# Patient Record
Sex: MALE | Race: WHITE | Employment: OTHER | URBAN - METROPOLITAN AREA
[De-identification: names, ages, dates, MRNs, and addresses within clinical notes are randomized per-mention and may not be internally consistent; named-entity substitution may affect disease eponyms.]

---

## 2017-01-20 DIAGNOSIS — I48.20 CHRONIC ATRIAL FIBRILLATION (HCC): ICD-10-CM

## 2017-01-20 RX ORDER — WARFARIN SODIUM 1 MG/1
1 TABLET ORAL DAILY
Qty: 90 TABLET | Refills: 3 | Status: SHIPPED | OUTPATIENT
Start: 2017-01-20 | End: 2018-04-06 | Stop reason: ALTCHOICE

## 2017-01-27 ENCOUNTER — HOSPITAL ENCOUNTER (OUTPATIENT)
Age: 82
Setting detail: SPECIMEN
Discharge: HOME | End: 2017-01-27
Payer: MEDICARE

## 2017-01-27 ENCOUNTER — NURSE ONLY (OUTPATIENT)
Dept: FAMILY MEDICINE CLINIC | Age: 82
End: 2017-01-27

## 2017-01-27 DIAGNOSIS — I48.20 CHRONIC ATRIAL FIBRILLATION (HCC): Primary | ICD-10-CM

## 2017-01-27 DIAGNOSIS — E87.6 POTASSIUM DEFICIENCY: ICD-10-CM

## 2017-01-27 DIAGNOSIS — Z23 NEED FOR PNEUMOCOCCAL VACCINATION: ICD-10-CM

## 2017-01-27 LAB
ANION GAP SERPL CALCULATED.3IONS-SCNC: 14 MEQ/L (ref 7–13)
BUN BLDV-MCNC: 29 MG/DL (ref 8–23)
CALCIUM SERPL-MCNC: 9.1 MG/DL (ref 8.6–10.2)
CHLORIDE BLD-SCNC: 98 MEQ/L (ref 98–107)
CO2: 31 MEQ/L (ref 22–29)
CREAT SERPL-MCNC: 1.09 MG/DL (ref 0.7–1.2)
GFR AFRICAN AMERICAN: >60
GFR NON-AFRICAN AMERICAN: >60
GLUCOSE BLD-MCNC: 113 MG/DL (ref 74–109)
INTERNATIONAL NORMALIZATION RATIO, POC: 5.4
POTASSIUM SERPL-SCNC: 3.6 MEQ/L (ref 3.5–5.1)
PROTHROMBIN TIME, POC: ABNORMAL
SODIUM BLD-SCNC: 143 MEQ/L (ref 132–144)

## 2017-01-27 PROCEDURE — 85610 PROTHROMBIN TIME: CPT | Performed by: FAMILY MEDICINE

## 2017-01-27 PROCEDURE — G0009 ADMIN PNEUMOCOCCAL VACCINE: HCPCS | Performed by: FAMILY MEDICINE

## 2017-01-27 PROCEDURE — 80048 BASIC METABOLIC PNL TOTAL CA: CPT

## 2017-01-27 PROCEDURE — 90732 PPSV23 VACC 2 YRS+ SUBQ/IM: CPT | Performed by: FAMILY MEDICINE

## 2017-02-04 ENCOUNTER — HOSPITAL ENCOUNTER (OUTPATIENT)
Dept: LAB | Age: 82
Discharge: HOME | End: 2017-02-04
Payer: MEDICARE

## 2017-02-04 LAB
INR BLD: 4.4
PROTHROMBIN TIME: 50 SEC (ref 8.1–13.7)

## 2017-02-04 PROCEDURE — 36415 COLL VENOUS BLD VENIPUNCTURE: CPT

## 2017-02-04 PROCEDURE — 85610 PROTHROMBIN TIME: CPT

## 2017-02-07 ENCOUNTER — ANTI-COAG VISIT (OUTPATIENT)
Dept: FAMILY MEDICINE CLINIC | Age: 82
End: 2017-02-07

## 2017-02-07 DIAGNOSIS — I48.20 CHRONIC ATRIAL FIBRILLATION (HCC): Primary | ICD-10-CM

## 2017-02-07 RX ORDER — WARFARIN SODIUM 5 MG/1
TABLET ORAL
Qty: 30 TABLET | Refills: 11 | Status: SHIPPED | OUTPATIENT
Start: 2017-02-07 | End: 2018-04-06 | Stop reason: ALTCHOICE

## 2017-02-10 DIAGNOSIS — K21.9 GASTROESOPHAGEAL REFLUX DISEASE WITHOUT ESOPHAGITIS: ICD-10-CM

## 2017-02-10 DIAGNOSIS — E03.9 HYPOTHYROIDISM, UNSPECIFIED TYPE: ICD-10-CM

## 2017-02-10 RX ORDER — LEVOTHYROXINE SODIUM 112 UG/1
TABLET ORAL
Qty: 90 TABLET | Refills: 3 | Status: SHIPPED | OUTPATIENT
Start: 2017-02-10 | End: 2017-11-15 | Stop reason: SDUPTHER

## 2017-02-10 RX ORDER — OMEPRAZOLE 20 MG/1
CAPSULE, DELAYED RELEASE ORAL
Qty: 90 CAPSULE | Refills: 3 | Status: SHIPPED | OUTPATIENT
Start: 2017-02-10 | End: 2017-11-15 | Stop reason: SDUPTHER

## 2017-02-25 ENCOUNTER — HOSPITAL ENCOUNTER (OUTPATIENT)
Dept: LAB | Age: 82
Discharge: HOME | End: 2017-02-25
Payer: MEDICARE

## 2017-02-25 LAB
INR BLD: 1.8
PROTHROMBIN TIME: 20.2 SEC (ref 8.1–13.7)

## 2017-02-25 PROCEDURE — 36415 COLL VENOUS BLD VENIPUNCTURE: CPT

## 2017-02-25 PROCEDURE — 85610 PROTHROMBIN TIME: CPT

## 2017-04-21 ENCOUNTER — OFFICE VISIT (OUTPATIENT)
Dept: FAMILY MEDICINE CLINIC | Age: 82
End: 2017-04-21

## 2017-04-21 ENCOUNTER — TELEPHONE (OUTPATIENT)
Dept: FAMILY MEDICINE CLINIC | Age: 82
End: 2017-04-21

## 2017-04-21 VITALS
BODY MASS INDEX: 36.31 KG/M2 | HEART RATE: 65 BPM | WEIGHT: 282.8 LBS | TEMPERATURE: 98.5 F | SYSTOLIC BLOOD PRESSURE: 114 MMHG | DIASTOLIC BLOOD PRESSURE: 68 MMHG

## 2017-04-21 DIAGNOSIS — L97.509: ICD-10-CM

## 2017-04-21 DIAGNOSIS — I50.32 HEART FAILURE, DIASTOLIC, CHRONIC (HCC): ICD-10-CM

## 2017-04-21 DIAGNOSIS — I48.0 PAROXYSMAL ATRIAL FIBRILLATION (HCC): ICD-10-CM

## 2017-04-21 DIAGNOSIS — E13.621: ICD-10-CM

## 2017-04-21 DIAGNOSIS — K74.60 CIRRHOSIS OF LIVER WITHOUT ASCITES, UNSPECIFIED HEPATIC CIRRHOSIS TYPE (HCC): ICD-10-CM

## 2017-04-21 PROCEDURE — 99213 OFFICE O/P EST LOW 20 MIN: CPT | Performed by: FAMILY MEDICINE

## 2017-04-21 PROCEDURE — 4040F PNEUMOC VAC/ADMIN/RCVD: CPT | Performed by: FAMILY MEDICINE

## 2017-04-21 PROCEDURE — G8427 DOCREV CUR MEDS BY ELIG CLIN: HCPCS | Performed by: FAMILY MEDICINE

## 2017-04-21 PROCEDURE — 1123F ACP DISCUSS/DSCN MKR DOCD: CPT | Performed by: FAMILY MEDICINE

## 2017-04-21 PROCEDURE — G8419 CALC BMI OUT NRM PARAM NOF/U: HCPCS | Performed by: FAMILY MEDICINE

## 2017-04-21 PROCEDURE — 1036F TOBACCO NON-USER: CPT | Performed by: FAMILY MEDICINE

## 2017-04-21 ASSESSMENT — ENCOUNTER SYMPTOMS
CONSTIPATION: 0
RHINORRHEA: 0
DIARRHEA: 0
ABDOMINAL PAIN: 0
WHEEZING: 0
SORE THROAT: 0
COUGH: 0
SHORTNESS OF BREATH: 0

## 2017-04-25 ENCOUNTER — TELEPHONE (OUTPATIENT)
Dept: FAMILY MEDICINE CLINIC | Age: 82
End: 2017-04-25

## 2017-05-20 ENCOUNTER — HOSPITAL ENCOUNTER (OUTPATIENT)
Dept: LAB | Age: 82
Discharge: HOME | End: 2017-05-20
Payer: MEDICARE

## 2017-05-20 LAB
INR BLD: 2.7
PROTHROMBIN TIME: 29.6 SEC (ref 8.1–13.7)

## 2017-05-20 PROCEDURE — 85610 PROTHROMBIN TIME: CPT

## 2017-05-20 PROCEDURE — 36415 COLL VENOUS BLD VENIPUNCTURE: CPT

## 2017-06-17 DIAGNOSIS — E87.6 HYPOKALEMIA: ICD-10-CM

## 2017-06-19 RX ORDER — POTASSIUM CHLORIDE 1500 MG/1
TABLET, EXTENDED RELEASE ORAL
Qty: 90 TABLET | Refills: 2 | Status: SHIPPED | OUTPATIENT
Start: 2017-06-19 | End: 2017-09-19 | Stop reason: SDUPTHER

## 2017-06-27 DIAGNOSIS — E78.5 HYPERLIPIDEMIA, UNSPECIFIED HYPERLIPIDEMIA TYPE: ICD-10-CM

## 2017-06-28 RX ORDER — LOVASTATIN 20 MG/1
TABLET ORAL
Qty: 90 TABLET | Refills: 3 | Status: SHIPPED | OUTPATIENT
Start: 2017-06-28 | End: 2018-05-14 | Stop reason: SDUPTHER

## 2017-07-08 DIAGNOSIS — I48.20 CHRONIC ATRIAL FIBRILLATION (HCC): ICD-10-CM

## 2017-07-10 RX ORDER — WARFARIN SODIUM 7.5 MG/1
TABLET ORAL
Qty: 90 TABLET | Refills: 3 | Status: SHIPPED | OUTPATIENT
Start: 2017-07-10 | End: 2018-07-12 | Stop reason: SDUPTHER

## 2017-07-11 ENCOUNTER — CARE COORDINATION (OUTPATIENT)
Dept: FAMILY MEDICINE CLINIC | Age: 82
End: 2017-07-11

## 2017-07-18 ENCOUNTER — CARE COORDINATION (OUTPATIENT)
Dept: FAMILY MEDICINE CLINIC | Age: 82
End: 2017-07-18

## 2017-08-02 DIAGNOSIS — I10 ESSENTIAL HYPERTENSION: ICD-10-CM

## 2017-08-02 DIAGNOSIS — E78.5 HYPERLIPIDEMIA, UNSPECIFIED HYPERLIPIDEMIA TYPE: ICD-10-CM

## 2017-08-02 RX ORDER — NISOLDIPINE 8.5 MG/1
TABLET, FILM COATED, EXTENDED RELEASE ORAL
Qty: 90 TABLET | Refills: 2 | Status: SHIPPED | OUTPATIENT
Start: 2017-08-02 | End: 2018-02-03 | Stop reason: SDUPTHER

## 2017-08-02 RX ORDER — FENOFIBRATE 160 MG/1
TABLET ORAL
Qty: 90 TABLET | Refills: 2 | Status: SHIPPED | OUTPATIENT
Start: 2017-08-02 | End: 2018-02-03 | Stop reason: SDUPTHER

## 2017-08-04 DIAGNOSIS — I48.20 CHRONIC ATRIAL FIBRILLATION (HCC): ICD-10-CM

## 2017-08-04 DIAGNOSIS — I50.9 CONGESTIVE HEART FAILURE, UNSPECIFIED CONGESTIVE HEART FAILURE CHRONICITY, UNSPECIFIED CONGESTIVE HEART FAILURE TYPE: ICD-10-CM

## 2017-08-05 RX ORDER — FUROSEMIDE 40 MG/1
TABLET ORAL
Qty: 90 TABLET | Refills: 2 | Status: SHIPPED | OUTPATIENT
Start: 2017-08-05 | End: 2018-05-14 | Stop reason: SDUPTHER

## 2017-08-05 RX ORDER — METOPROLOL TARTRATE 50 MG/1
TABLET, FILM COATED ORAL
Qty: 45 TABLET | Refills: 3 | Status: SHIPPED | OUTPATIENT
Start: 2017-08-05 | End: 2018-08-11 | Stop reason: SDUPTHER

## 2017-08-05 RX ORDER — METOLAZONE 5 MG/1
TABLET ORAL
Qty: 90 TABLET | Refills: 1 | Status: SHIPPED | OUTPATIENT
Start: 2017-08-05 | End: 2018-02-03 | Stop reason: SDUPTHER

## 2017-08-11 ENCOUNTER — HOSPITAL ENCOUNTER (OUTPATIENT)
Age: 82
Setting detail: SPECIMEN
Discharge: HOME | End: 2017-08-11
Payer: MEDICARE

## 2017-08-11 ENCOUNTER — NURSE ONLY (OUTPATIENT)
Dept: FAMILY MEDICINE CLINIC | Age: 82
End: 2017-08-11

## 2017-08-11 DIAGNOSIS — I48.20 CHRONIC ATRIAL FIBRILLATION (HCC): ICD-10-CM

## 2017-08-11 DIAGNOSIS — I50.9 CONGESTIVE HEART FAILURE, UNSPECIFIED CONGESTIVE HEART FAILURE CHRONICITY, UNSPECIFIED CONGESTIVE HEART FAILURE TYPE: ICD-10-CM

## 2017-08-11 LAB
ANION GAP SERPL CALCULATED.3IONS-SCNC: 20 MEQ/L (ref 7–13)
BUN BLDV-MCNC: 29 MG/DL (ref 8–23)
CALCIUM SERPL-MCNC: 8.9 MG/DL (ref 8.6–10.2)
CHLORIDE BLD-SCNC: 92 MEQ/L (ref 98–107)
CO2: 29 MEQ/L (ref 22–29)
CREAT SERPL-MCNC: 1.21 MG/DL (ref 0.7–1.2)
GFR AFRICAN AMERICAN: >60
GFR NON-AFRICAN AMERICAN: 57.8
GLUCOSE BLD-MCNC: 82 MG/DL (ref 74–109)
INTERNATIONAL NORMALIZATION RATIO, POC: 1.9
POTASSIUM SERPL-SCNC: 3.7 MEQ/L (ref 3.5–5.1)
PROTHROMBIN TIME, POC: ABNORMAL
SODIUM BLD-SCNC: 141 MEQ/L (ref 132–144)

## 2017-08-11 PROCEDURE — 36415 COLL VENOUS BLD VENIPUNCTURE: CPT | Performed by: FAMILY MEDICINE

## 2017-08-11 PROCEDURE — 85610 PROTHROMBIN TIME: CPT | Performed by: FAMILY MEDICINE

## 2017-08-11 PROCEDURE — 80048 BASIC METABOLIC PNL TOTAL CA: CPT

## 2017-08-14 ENCOUNTER — CARE COORDINATION (OUTPATIENT)
Dept: FAMILY MEDICINE CLINIC | Age: 82
End: 2017-08-14

## 2017-09-19 DIAGNOSIS — E87.6 HYPOKALEMIA: ICD-10-CM

## 2017-09-19 RX ORDER — POTASSIUM CHLORIDE 20 MEQ/1
TABLET, EXTENDED RELEASE ORAL
Qty: 225 TABLET | Refills: 2 | Status: SHIPPED | OUTPATIENT
Start: 2017-09-19 | End: 2018-02-03 | Stop reason: SDUPTHER

## 2017-10-04 ENCOUNTER — CARE COORDINATION (OUTPATIENT)
Dept: FAMILY MEDICINE CLINIC | Age: 82
End: 2017-10-04

## 2017-10-04 NOTE — CARE COORDINATION
This patient was temporarily screened out of Care Coordination on 10/4/2017 for the following reason:  Unable to contact patient

## 2017-10-14 ENCOUNTER — OFFICE VISIT (OUTPATIENT)
Dept: FAMILY MEDICINE CLINIC | Age: 82
End: 2017-10-14

## 2017-10-14 VITALS
SYSTOLIC BLOOD PRESSURE: 112 MMHG | OXYGEN SATURATION: 97 % | HEIGHT: 74 IN | DIASTOLIC BLOOD PRESSURE: 72 MMHG | TEMPERATURE: 97.4 F | HEART RATE: 67 BPM | BODY MASS INDEX: 36.59 KG/M2 | WEIGHT: 285.1 LBS

## 2017-10-14 DIAGNOSIS — Z23 NEED FOR INFLUENZA VACCINATION: ICD-10-CM

## 2017-10-14 DIAGNOSIS — I48.20 CHRONIC ATRIAL FIBRILLATION (HCC): Primary | ICD-10-CM

## 2017-10-14 DIAGNOSIS — Z00.00 PHYSICAL EXAM: ICD-10-CM

## 2017-10-14 DIAGNOSIS — R26.9 GAIT DIFFICULTY: ICD-10-CM

## 2017-10-14 LAB
INTERNATIONAL NORMALIZATION RATIO, POC: 2.9
PROTHROMBIN TIME, POC: NORMAL

## 2017-10-14 PROCEDURE — 1036F TOBACCO NON-USER: CPT | Performed by: FAMILY MEDICINE

## 2017-10-14 PROCEDURE — 1123F ACP DISCUSS/DSCN MKR DOCD: CPT | Performed by: FAMILY MEDICINE

## 2017-10-14 PROCEDURE — G8484 FLU IMMUNIZE NO ADMIN: HCPCS | Performed by: FAMILY MEDICINE

## 2017-10-14 PROCEDURE — G0008 ADMIN INFLUENZA VIRUS VAC: HCPCS | Performed by: FAMILY MEDICINE

## 2017-10-14 PROCEDURE — G8417 CALC BMI ABV UP PARAM F/U: HCPCS | Performed by: FAMILY MEDICINE

## 2017-10-14 PROCEDURE — 85610 PROTHROMBIN TIME: CPT | Performed by: FAMILY MEDICINE

## 2017-10-14 PROCEDURE — 99213 OFFICE O/P EST LOW 20 MIN: CPT | Performed by: FAMILY MEDICINE

## 2017-10-14 PROCEDURE — 90662 IIV NO PRSV INCREASED AG IM: CPT | Performed by: FAMILY MEDICINE

## 2017-10-14 PROCEDURE — 4040F PNEUMOC VAC/ADMIN/RCVD: CPT | Performed by: FAMILY MEDICINE

## 2017-10-14 PROCEDURE — G8427 DOCREV CUR MEDS BY ELIG CLIN: HCPCS | Performed by: FAMILY MEDICINE

## 2017-10-14 ASSESSMENT — ENCOUNTER SYMPTOMS
ABDOMINAL PAIN: 0
WHEEZING: 0
SHORTNESS OF BREATH: 0
SORE THROAT: 0
RHINORRHEA: 0
CONSTIPATION: 0
DIARRHEA: 0
COUGH: 0

## 2017-10-14 NOTE — PROGRESS NOTES
[address] [place] [address] PRIMARY CARE [address] [id number]  Dept: [phone]  Dept Fax: : [phone]   Chief Complaint  Chief Complaint   Patient presents with    Other     Pt is here today to have an evaluation done to get a lift chair. ( pt's daughter would like it faxed to [facility])       HPI:  80 y.o. male who presents for difficulty with mobilization:    Difficulty with walking, standing, tranferring. Uses cane for walking but has been using a lift chair to help him standing from sitting to standing. It's curretnly stopped working and he needs a new one. Chronic afib: takes 7.5 warfarin daily. INR 2.9 today. Past Medical History:   Diagnosis Date    [facility]     Carcinoma of ascending colon ([facility] 2014    CHF (congestive heart failure) (HCC)     Chronic atrial fibrillation ([facility] 3/3/2016    Dementia     Foot ulcer, left ([facility] 3/3/2016    Hyperlipidemia     Hypertension     Hypothyroidism     Type II or unspecified type diabetes mellitus without mention of complication, not stated as uncontrolled     diet     Past Surgical History:   Procedure Laterality Date    COLONOSCOPY  5/9/14    bx     OTHER SURGICAL HISTORY  7/29/14    Right colectomy with primary anastomosis and liver bx    RIGHT COLECTOMY  07/29/14    ANDSAVANNAKO    UPPER GASTROINTESTINAL ENDOSCOPY  5/9/14    bx ike     Social History     Social History    Marital status:      Spouse name: N/A    Number of children: N/A    Years of education: N/A     Occupational History    Not on file.      Social History Main Topics    Smoking status: Never Smoker    Smokeless tobacco: Never Used    Alcohol use No    Drug use: No    Sexual activity: No     Other Topics Concern    Not on file     Social History Narrative    No narrative on file     No Known Allergies  Current Outpatient Prescriptions Medication Sig Dispense Refill    Lift Chair MISC by Does not apply route 1 each 0    potassium chloride (KLOR-CON M20) 20 MEQ extended release tablet Take 2 (TWO) and ONE-HALF tablets by mouth twice daily 225 tablet 2    metolazone (ZAROXOLYN) 5 MG tablet TAKE 1 TABLET BY MOUTH DAILY. 90 tablet 1    metoprolol tartrate (LOPRESSOR) 50 MG tablet take ONE-HALF tablet once a day 45 tablet 3    furosemide (LASIX) 40 MG tablet Take 1 tablet by mouth daily 90 tablet 2    nisoldipine (SULAR) 8.5 MG extended release tablet TAKE 1 TABLET BY MOUTH EVERY DAY 90 tablet 2    fenofibrate 160 MG tablet Take 1 tablet by mouth daily Note to pharmacy: Please dispense generic fenofibrate unless prescriber denotes ELDA 90 tablet 2    warfarin (COUMADIN) 7.5 MG tablet Take as directed 90 tablet 3    lovastatin (MEVACOR) 20 MG tablet Take 1 tablet by mouth nightly 90 tablet 3    levothyroxine (SYNTHROID) 112 MCG tablet TAKE 1 TABLET BY MOUTH DAILY. 90 tablet 3    omeprazole (PRILOSEC) 20 MG delayed release capsule Take 1 capsule by mouth daily 90 capsule 3    warfarin (COUMADIN) 5 MG tablet Per coumadin regimen instructions 30 tablet 11    warfarin (COUMADIN) 1 MG tablet Take 1 tablet by mouth daily 90 tablet [address] by Does not apply route 1 each 0    Lift Chair MISC by Does not apply route 1 each 0    ferrous sulfate 325 (65 FE) MG tablet Take 1 tablet by mouth 2 times daily 180 tablet 3    SILVERSEAL HYDROGEL DRESSING 4\"X5\" PADS Apply 1 Units topically daily 20 each 2    Incontinence Supply Disposable (ATTENDS BARIATRIC BRIEF XXL) MISC Use around the clock as needed 2 Bottle 0    aspirin 81 MG tablet Take 81 mg by mouth daily. No current facility-administered medications for this visit. ROS:  Review of Systems   Constitutional: Negative for chills and fever. HENT: Negative for rhinorrhea and sore throat. Respiratory: Negative for cough, shortness of breath and wheezing. Gastrointestinal: Negative for abdominal pain, constipation and diarrhea. Endocrine: Negative for polydipsia and polyuria. Genitourinary: Negative for dysuria, frequency and urgency. Musculoskeletal: Positive for gait problem. Neurological: Negative for syncope, light-headedness, numbness and headaches. Psychiatric/Behavioral: Negative for sleep disturbance. The patient is not nervous/anxious. Vitals:    10/14/17 0842   BP: 112/72   Site: Left Arm   Position: Sitting   Cuff Size: Large Adult   Pulse: 67   Temp: 97.4 °F (36.3 °C)   TempSrc: Oral   SpO2: 97%   Weight: 285 lb 1.6 oz (129.3 kg)   Height: 6' 2\" (1.88 m)       Physical exam:  Physical Exam   Constitutional: He is oriented to person, place, and time. He appears well-developed and well-nourished. No distress. HENT:   Head: Normocephalic and atraumatic. Mouth/Throat: No oropharyngeal exudate. Eyes: EOM are normal.   Neck: No thyromegaly present. Cardiovascular: Normal rate, regular rhythm and normal heart sounds. No murmur heard. Pulmonary/Chest: Effort normal and breath sounds normal. No respiratory distress. He has no wheezes. Abdominal: Soft. He exhibits no distension. There is no tenderness. There is no rebound and no guarding. Lymphadenopathy:     He has no cervical adenopathy. Neurological: He is alert and oriented to person, place, and time. Subtle leg weakness. Skin: Skin is warm and dry. Psychiatric: He has a normal mood and affect. His behavior is normal.   Vitals reviewed. Assessment/Plan:  80 y.o. male here mainly for afib:  - Afib: doing well; INR at goal 2.9; no changes. Next INR in a month. - Physical exam: stable on standing but a little weak. Order for new lift chair    1. Chronic atrial fibrillation (HCC)  POCT INR   2. Need for influenza vaccination  INFLUENZA, HIGH DOSE, 65 YRS +, IM, PF, PREFILL SYR, 0.5ML (FLUZONE HD)   3. Gait difficulty  Lift Chair MISC   4.  Physical exam  Lift Chair [address]        Return if symptoms worsen or fail to improve.     Kim Vickers MD

## 2017-11-09 ENCOUNTER — HOSPITAL ENCOUNTER (OUTPATIENT)
Age: 82
Setting detail: SPECIMEN
Discharge: HOME | End: 2017-11-09
Payer: MEDICARE

## 2017-11-09 ENCOUNTER — OFFICE VISIT (OUTPATIENT)
Dept: FAMILY MEDICINE CLINIC | Age: 82
End: 2017-11-09

## 2017-11-09 VITALS
SYSTOLIC BLOOD PRESSURE: 120 MMHG | HEART RATE: 61 BPM | OXYGEN SATURATION: 96 % | RESPIRATION RATE: 14 BRPM | TEMPERATURE: 97.1 F | BODY MASS INDEX: 37.42 KG/M2 | DIASTOLIC BLOOD PRESSURE: 72 MMHG | WEIGHT: 276.25 LBS | HEIGHT: 72 IN

## 2017-11-09 DIAGNOSIS — R30.0 DYSURIA: ICD-10-CM

## 2017-11-09 DIAGNOSIS — N30.01 ACUTE CYSTITIS WITH HEMATURIA: Primary | ICD-10-CM

## 2017-11-09 LAB
BILIRUBIN, POC: ABNORMAL
BLOOD URINE, POC: ABNORMAL
CLARITY, POC: ABNORMAL
COLOR, POC: ABNORMAL
GLUCOSE URINE, POC: ABNORMAL
KETONES, POC: ABNORMAL
LEUKOCYTE EST, POC: ABNORMAL
NITRITE, POC: ABNORMAL
PH, POC: 6
PROTEIN, POC: ABNORMAL
SPECIFIC GRAVITY, POC: 1.03
UROBILINOGEN, POC: ABNORMAL

## 2017-11-09 PROCEDURE — 4040F PNEUMOC VAC/ADMIN/RCVD: CPT | Performed by: NURSE PRACTITIONER

## 2017-11-09 PROCEDURE — G8417 CALC BMI ABV UP PARAM F/U: HCPCS | Performed by: NURSE PRACTITIONER

## 2017-11-09 PROCEDURE — 81002 URINALYSIS NONAUTO W/O SCOPE: CPT | Performed by: NURSE PRACTITIONER

## 2017-11-09 PROCEDURE — G8484 FLU IMMUNIZE NO ADMIN: HCPCS | Performed by: NURSE PRACTITIONER

## 2017-11-09 PROCEDURE — G8427 DOCREV CUR MEDS BY ELIG CLIN: HCPCS | Performed by: NURSE PRACTITIONER

## 2017-11-09 PROCEDURE — 1036F TOBACCO NON-USER: CPT | Performed by: NURSE PRACTITIONER

## 2017-11-09 PROCEDURE — 87086 URINE CULTURE/COLONY COUNT: CPT

## 2017-11-09 PROCEDURE — 1123F ACP DISCUSS/DSCN MKR DOCD: CPT | Performed by: NURSE PRACTITIONER

## 2017-11-09 PROCEDURE — 99213 OFFICE O/P EST LOW 20 MIN: CPT | Performed by: NURSE PRACTITIONER

## 2017-11-09 RX ORDER — SULFAMETHOXAZOLE AND TRIMETHOPRIM 800; 160 MG/1; MG/1
1 TABLET ORAL 2 TIMES DAILY
Qty: 20 TABLET | Refills: 0 | Status: SHIPPED | OUTPATIENT
Start: 2017-11-09 | End: 2017-11-19

## 2017-11-10 ASSESSMENT — ENCOUNTER SYMPTOMS
NAUSEA: 0
VOMITING: 0

## 2017-11-10 NOTE — PROGRESS NOTES
Subjective  [place]arm [place], 80 y.o. male presents today with:  Chief Complaint   Patient presents with    Urinary Tract Infection     x today. complains of urinary frequency, malodor, confusion. has tried increased fluids with unknown relief. Urinary Tract Infection    This is a new problem. The current episode started today. The problem occurs every urination. The problem has been unchanged. The patient is experiencing no pain. There has been no fever. Associated symptoms include frequency, hesitancy and urgency. Pertinent negatives include no chills, discharge, flank pain, hematuria, nausea, sweats or vomiting. He has tried increased fluids for the symptoms. The treatment provided no relief. Objective    Vitals:    11/09/17 1800   BP: 120/72   Pulse: 61   Resp: 14   Temp: 97.1 °F (36.2 °C)   TempSrc: Temporal   SpO2: 96%   Weight: 276 lb 4 oz (125.3 kg)   Height: 6' (1.829 m)       Physical Exam   Constitutional: He is oriented to person, place, and time. He appears well-developed and well-nourished. HENT:   Head: Normocephalic and atraumatic. Eyes: Conjunctivae and EOM are normal.   Neck: Normal range of motion. Pulmonary/Chest: Effort normal.   Abdominal: There is no CVA tenderness. Musculoskeletal: Normal range of motion. Neurological: He is alert and oriented to person, place, and time. Skin: Skin is warm and dry. Psychiatric: He has a normal mood and affect. Assessment & Plan   1. Acute cystitis with hematuria  Urine Culture    sulfamethoxazole-trimethoprim (BACTRIM DS;SEPTRA DS) 800-160 MG per tablet    Symptomatic, RX given, UA positive, sent for full cx   2. Dysuria  POCT URINALYSIS DIPSTICK    Urine Culture    sulfamethoxazole-trimethoprim (BACTRIM DS;SEPTRA DS) 800-160 MG per tablet    see 1       Return in about 1 week (around 11/16/2017) for INR check with PCP. Reviewed with the patient: current clinical status, medications, activities and diet.      Side effects, adverse effects of the medication prescribed today, as well as treatment plan/ rationale and result expectations have been discussed with the patient who expresses understanding and desires to proceed. Close follow up to evaluate treatment results and for coordination of care. I have reviewed the patient's medical history in detail and updated the computerized patient record.     Vini Cardenas NP

## 2017-11-12 LAB — URINE CULTURE, ROUTINE: NORMAL

## 2017-11-15 DIAGNOSIS — K21.9 GASTROESOPHAGEAL REFLUX DISEASE WITHOUT ESOPHAGITIS: ICD-10-CM

## 2017-11-15 DIAGNOSIS — E03.9 HYPOTHYROIDISM, UNSPECIFIED TYPE: ICD-10-CM

## 2017-11-16 RX ORDER — LEVOTHYROXINE SODIUM 112 UG/1
TABLET ORAL
Qty: 90 TABLET | Refills: 2 | Status: SHIPPED | OUTPATIENT
Start: 2017-11-16 | End: 2018-08-29 | Stop reason: SDUPTHER

## 2017-11-16 RX ORDER — OMEPRAZOLE 20 MG/1
CAPSULE, DELAYED RELEASE ORAL
Qty: 90 CAPSULE | Refills: 2 | Status: SHIPPED | OUTPATIENT
Start: 2017-11-16 | End: 2018-08-29 | Stop reason: SDUPTHER

## 2017-11-17 ENCOUNTER — OFFICE VISIT (OUTPATIENT)
Dept: FAMILY MEDICINE CLINIC | Age: 82
End: 2017-11-17

## 2017-11-17 VITALS
DIASTOLIC BLOOD PRESSURE: 72 MMHG | HEART RATE: 109 BPM | BODY MASS INDEX: 38.11 KG/M2 | OXYGEN SATURATION: 100 % | SYSTOLIC BLOOD PRESSURE: 138 MMHG | WEIGHT: 281 LBS

## 2017-11-17 DIAGNOSIS — I48.20 CHRONIC ATRIAL FIBRILLATION (HCC): Primary | ICD-10-CM

## 2017-11-17 LAB
INTERNATIONAL NORMALIZATION RATIO, POC: 3.3
PROTHROMBIN TIME, POC: ABNORMAL

## 2017-11-17 PROCEDURE — G8484 FLU IMMUNIZE NO ADMIN: HCPCS | Performed by: FAMILY MEDICINE

## 2017-11-17 PROCEDURE — 4040F PNEUMOC VAC/ADMIN/RCVD: CPT | Performed by: FAMILY MEDICINE

## 2017-11-17 PROCEDURE — 1123F ACP DISCUSS/DSCN MKR DOCD: CPT | Performed by: FAMILY MEDICINE

## 2017-11-17 PROCEDURE — G8427 DOCREV CUR MEDS BY ELIG CLIN: HCPCS | Performed by: FAMILY MEDICINE

## 2017-11-17 PROCEDURE — G8417 CALC BMI ABV UP PARAM F/U: HCPCS | Performed by: FAMILY MEDICINE

## 2017-11-17 PROCEDURE — 1036F TOBACCO NON-USER: CPT | Performed by: FAMILY MEDICINE

## 2017-11-17 PROCEDURE — 99213 OFFICE O/P EST LOW 20 MIN: CPT | Performed by: FAMILY MEDICINE

## 2017-11-17 ASSESSMENT — ENCOUNTER SYMPTOMS
ABDOMINAL PAIN: 0
CONSTIPATION: 0
RHINORRHEA: 0
DIARRHEA: 0
COUGH: 0
SHORTNESS OF BREATH: 0
WHEEZING: 0
SORE THROAT: 0

## 2017-11-17 NOTE — PROGRESS NOTES
wheezing. Gastrointestinal: Negative for abdominal pain, constipation and diarrhea. Endocrine: Negative for polydipsia and polyuria. Genitourinary: Positive for frequency. Negative for dysuria and urgency. Neurological: Negative for syncope, light-headedness, numbness and headaches. Psychiatric/Behavioral: Negative for sleep disturbance. The patient is not nervous/anxious. Vitals:    11/17/17 0948   BP: 138/72   Pulse: 109   SpO2: 100%   Weight: 281 lb (127.5 kg)       Physical exam:  Physical Exam   Constitutional: He is oriented to person, place, and time. He appears well-developed and well-nourished. No distress. HENT:   Head: Normocephalic and atraumatic. Mouth/Throat: No oropharyngeal exudate. Eyes: EOM are normal.   Neck: Normal range of motion. Cardiovascular: Normal rate. Pulmonary/Chest: Effort normal. No respiratory distress. Abdominal: Soft. He exhibits no distension. There is no tenderness. There is no rebound and no guarding. Neurological: He is alert and oriented to person, place, and time. Skin: Skin is warm and dry. Psychiatric: He has a normal mood and affect. His behavior is normal.   Vitals reviewed. Assessment/Plan:  80 y.o. male here mainly for f/u uti:  - UTI and microscopic hematuria: checking new UA  - Anticoag: no change in regimen; Next INR in 1-2 weeks. 1. Chronic atrial fibrillation (HCC)  POCT INR        Return if symptoms worsen or fail to improve.     Leonardo Almanza MD

## 2017-11-20 ENCOUNTER — TELEPHONE (OUTPATIENT)
Dept: FAMILY MEDICINE CLINIC | Age: 82
End: 2017-11-20

## 2017-11-20 NOTE — TELEPHONE ENCOUNTER
Sissy Unger( )called in to let you know Angel Lance missed his Adult day program due to transportation.

## 2017-12-11 ENCOUNTER — CARE COORDINATION (OUTPATIENT)
Dept: CARE COORDINATION | Age: 82
End: 2017-12-11

## 2017-12-11 NOTE — CARE COORDINATION
This patient was temporarily screened out of Care Coordination on 12/11/2017 for the following reason:  Unable to contact

## 2018-01-12 ENCOUNTER — OFFICE VISIT (OUTPATIENT)
Dept: FAMILY MEDICINE CLINIC | Age: 83
End: 2018-01-12

## 2018-01-12 VITALS
WEIGHT: 298.2 LBS | SYSTOLIC BLOOD PRESSURE: 130 MMHG | TEMPERATURE: 98.1 F | HEART RATE: 69 BPM | OXYGEN SATURATION: 96 % | DIASTOLIC BLOOD PRESSURE: 70 MMHG | BODY MASS INDEX: 40.44 KG/M2

## 2018-01-12 DIAGNOSIS — M54.50 LOW BACK PAIN WITHOUT SCIATICA, UNSPECIFIED BACK PAIN LATERALITY, UNSPECIFIED CHRONICITY: Primary | ICD-10-CM

## 2018-01-12 DIAGNOSIS — E66.01 MORBID OBESITY WITH BMI OF 40.0-44.9, ADULT (HCC): ICD-10-CM

## 2018-01-12 DIAGNOSIS — K74.60 CIRRHOSIS OF LIVER WITHOUT ASCITES, UNSPECIFIED HEPATIC CIRRHOSIS TYPE (HCC): ICD-10-CM

## 2018-01-12 DIAGNOSIS — I48.20 CHRONIC ATRIAL FIBRILLATION (HCC): ICD-10-CM

## 2018-01-12 DIAGNOSIS — I50.9 CONGESTIVE HEART FAILURE, UNSPECIFIED CONGESTIVE HEART FAILURE CHRONICITY, UNSPECIFIED CONGESTIVE HEART FAILURE TYPE: ICD-10-CM

## 2018-01-12 DIAGNOSIS — R35.0 URINARY FREQUENCY: ICD-10-CM

## 2018-01-12 DIAGNOSIS — R26.81 GAIT INSTABILITY: ICD-10-CM

## 2018-01-12 LAB
BILIRUBIN, POC: ABNORMAL
BLOOD URINE, POC: ABNORMAL
CLARITY, POC: CLEAR
COLOR, POC: ABNORMAL
GLUCOSE URINE, POC: ABNORMAL
INTERNATIONAL NORMALIZATION RATIO, POC: 1.5
KETONES, POC: ABNORMAL
LEUKOCYTE EST, POC: ABNORMAL
NITRITE, POC: ABNORMAL
PH, POC: 7.5
PROTEIN, POC: ABNORMAL
PROTHROMBIN TIME, POC: ABNORMAL
SPECIFIC GRAVITY, POC: 1.01
UROBILINOGEN, POC: ABNORMAL

## 2018-01-12 PROCEDURE — 4040F PNEUMOC VAC/ADMIN/RCVD: CPT | Performed by: FAMILY MEDICINE

## 2018-01-12 PROCEDURE — G8510 SCR DEP NEG, NO PLAN REQD: HCPCS | Performed by: FAMILY MEDICINE

## 2018-01-12 PROCEDURE — 99214 OFFICE O/P EST MOD 30 MIN: CPT | Performed by: FAMILY MEDICINE

## 2018-01-12 PROCEDURE — 1123F ACP DISCUSS/DSCN MKR DOCD: CPT | Performed by: FAMILY MEDICINE

## 2018-01-12 PROCEDURE — 1036F TOBACCO NON-USER: CPT | Performed by: FAMILY MEDICINE

## 2018-01-12 PROCEDURE — G8484 FLU IMMUNIZE NO ADMIN: HCPCS | Performed by: FAMILY MEDICINE

## 2018-01-12 PROCEDURE — 81003 URINALYSIS AUTO W/O SCOPE: CPT | Performed by: FAMILY MEDICINE

## 2018-01-12 PROCEDURE — G8427 DOCREV CUR MEDS BY ELIG CLIN: HCPCS | Performed by: FAMILY MEDICINE

## 2018-01-12 PROCEDURE — G8417 CALC BMI ABV UP PARAM F/U: HCPCS | Performed by: FAMILY MEDICINE

## 2018-01-12 PROCEDURE — 3288F FALL RISK ASSESSMENT DOCD: CPT | Performed by: FAMILY MEDICINE

## 2018-01-12 RX ORDER — TAMSULOSIN HYDROCHLORIDE 0.4 MG/1
0.4 CAPSULE ORAL DAILY
Qty: 30 CAPSULE | Refills: 3 | Status: SHIPPED | OUTPATIENT
Start: 2018-01-12 | End: 2018-07-28 | Stop reason: SDUPTHER

## 2018-01-12 RX ORDER — ACETAMINOPHEN 500 MG
1000 TABLET ORAL EVERY 8 HOURS PRN
Qty: 120 TABLET | Refills: 0 | Status: SHIPPED | OUTPATIENT
Start: 2018-01-12 | End: 2019-02-04

## 2018-01-12 ASSESSMENT — ENCOUNTER SYMPTOMS
BACK PAIN: 1
SHORTNESS OF BREATH: 0
WHEEZING: 0
ABDOMINAL PAIN: 0
SORE THROAT: 0
RHINORRHEA: 0
COUGH: 0
DIARRHEA: 0
CONSTIPATION: 0

## 2018-01-12 ASSESSMENT — PATIENT HEALTH QUESTIONNAIRE - PHQ9
1. LITTLE INTEREST OR PLEASURE IN DOING THINGS: 0
SUM OF ALL RESPONSES TO PHQ QUESTIONS 1-9: 0
SUM OF ALL RESPONSES TO PHQ9 QUESTIONS 1 & 2: 0
2. FEELING DOWN, DEPRESSED OR HOPELESS: 0

## 2018-01-12 NOTE — PROGRESS NOTES
HENT: Negative for rhinorrhea and sore throat. Respiratory: Negative for cough, shortness of breath and wheezing. Gastrointestinal: Negative for abdominal pain, constipation and diarrhea. Endocrine: Negative for polydipsia and polyuria. Genitourinary: Positive for frequency. Negative for dysuria and urgency. Musculoskeletal: Positive for back pain. Neurological: Negative for syncope, light-headedness, numbness and headaches. Psychiatric/Behavioral: Negative for sleep disturbance. The patient is not nervous/anxious. Vitals:    01/12/18 0843   BP: 130/70   Site: Left Arm   Position: Sitting   Cuff Size: Medium Adult   Pulse: 69   Temp: 98.1 °F (36.7 °C)   TempSrc: Oral   SpO2: 96%   Weight: 298 lb 3.2 oz (135.3 kg)       Physical exam:  Physical Exam   Constitutional: He is oriented to person, place, and time. He appears well-developed and well-nourished. No distress. HENT:   Head: Normocephalic and atraumatic. Mouth/Throat: No oropharyngeal exudate. Eyes: EOM are normal.   Neck: Normal range of motion. No thyromegaly present. Cardiovascular: Normal rate, regular rhythm and normal heart sounds. No murmur heard. Pulmonary/Chest: Effort normal and breath sounds normal. No respiratory distress. He has no wheezes. Abdominal: Soft. He exhibits no distension. There is no tenderness. There is no rebound and no guarding. Musculoskeletal:        Lumbar back: Normal.   Lymphadenopathy:     He has no cervical adenopathy. Neurological: He is alert and oriented to person, place, and time. Skin: Skin is warm and dry. Psychiatric: He has a normal mood and affect. His behavior is normal.   Vitals reviewed. Assessment/Plan:  [de-identified] y.o. male here mainly for LBP:  - INR: Increase to 8.5 on MWF and cont 7.5 all other days. Next INR in a week. - LBP: starting tylenol; he has gained a lot of weight. Encouraged improved diet; needs to see PT regarding eval for a lift chair.  Not falling but

## 2018-01-23 ENCOUNTER — OFFICE VISIT (OUTPATIENT)
Dept: FAMILY MEDICINE CLINIC | Age: 83
End: 2018-01-23
Payer: MEDICARE

## 2018-01-23 VITALS
BODY MASS INDEX: 38.57 KG/M2 | DIASTOLIC BLOOD PRESSURE: 80 MMHG | OXYGEN SATURATION: 98 % | HEART RATE: 78 BPM | SYSTOLIC BLOOD PRESSURE: 130 MMHG | TEMPERATURE: 97.9 F | WEIGHT: 284.4 LBS

## 2018-01-23 DIAGNOSIS — M54.41 ACUTE BILATERAL LOW BACK PAIN WITH BILATERAL SCIATICA: Primary | ICD-10-CM

## 2018-01-23 DIAGNOSIS — M54.42 ACUTE BILATERAL LOW BACK PAIN WITH BILATERAL SCIATICA: Primary | ICD-10-CM

## 2018-01-23 PROCEDURE — 1123F ACP DISCUSS/DSCN MKR DOCD: CPT | Performed by: FAMILY MEDICINE

## 2018-01-23 PROCEDURE — 1036F TOBACCO NON-USER: CPT | Performed by: FAMILY MEDICINE

## 2018-01-23 PROCEDURE — G8484 FLU IMMUNIZE NO ADMIN: HCPCS | Performed by: FAMILY MEDICINE

## 2018-01-23 PROCEDURE — 4040F PNEUMOC VAC/ADMIN/RCVD: CPT | Performed by: FAMILY MEDICINE

## 2018-01-23 PROCEDURE — G8417 CALC BMI ABV UP PARAM F/U: HCPCS | Performed by: FAMILY MEDICINE

## 2018-01-23 PROCEDURE — 96372 THER/PROPH/DIAG INJ SC/IM: CPT | Performed by: FAMILY MEDICINE

## 2018-01-23 PROCEDURE — G8427 DOCREV CUR MEDS BY ELIG CLIN: HCPCS | Performed by: FAMILY MEDICINE

## 2018-01-23 PROCEDURE — 99213 OFFICE O/P EST LOW 20 MIN: CPT | Performed by: FAMILY MEDICINE

## 2018-01-23 RX ORDER — KETOROLAC TROMETHAMINE 30 MG/ML
60 INJECTION, SOLUTION INTRAMUSCULAR; INTRAVENOUS ONCE
Status: COMPLETED | OUTPATIENT
Start: 2018-01-23 | End: 2018-01-23

## 2018-01-23 RX ORDER — CYCLOBENZAPRINE HCL 5 MG
5 TABLET ORAL 2 TIMES DAILY PRN
Qty: 30 TABLET | Refills: 1 | Status: SHIPPED | OUTPATIENT
Start: 2018-01-23 | End: 2018-02-02

## 2018-01-23 RX ORDER — TRAMADOL HYDROCHLORIDE 50 MG/1
50 TABLET ORAL 2 TIMES DAILY PRN
Qty: 14 TABLET | Refills: 0 | Status: SHIPPED | OUTPATIENT
Start: 2018-01-23 | End: 2018-01-30

## 2018-01-23 RX ADMIN — KETOROLAC TROMETHAMINE 60 MG: 30 INJECTION, SOLUTION INTRAMUSCULAR; INTRAVENOUS at 14:57

## 2018-01-23 ASSESSMENT — ENCOUNTER SYMPTOMS
BACK PAIN: 1
SHORTNESS OF BREATH: 0
ABDOMINAL PAIN: 0
COUGH: 0
CONSTIPATION: 0
RHINORRHEA: 0
SORE THROAT: 0
WHEEZING: 0
DIARRHEA: 0

## 2018-01-23 NOTE — PATIENT INSTRUCTIONS
Patient Education        Low Back Pain: Exercises  Your Care Instructions  Here are some examples of typical rehabilitation exercises for your condition. Start each exercise slowly. Ease off the exercise if you start to have pain. Your doctor or physical therapist will tell you when you can start these exercises and which ones will work best for you. How to do the exercises  Press-up    1. Lie on your stomach, supporting your body with your forearms. 2. Press your elbows down into the floor to raise your upper back. As you do this, relax your stomach muscles and allow your back to arch without using your back muscles. As your press up, do not let your hips or pelvis come off the floor. 3. Hold for 15 to 30 seconds, then relax. 4. Repeat 2 to 4 times. Alternate arm and leg (bird dog) exercise    Do this exercise slowly. Try to keep your body straight at all times, and do not let one hip drop lower than the other. 1. Start on the floor, on your hands and knees. 2. Tighten your belly muscles. 3. Raise one leg off the floor, and hold it straight out behind you. Be careful not to let your hip drop down, because that will twist your trunk. 4. Hold for about 6 seconds, then lower your leg and switch to the other leg. 5. Repeat 8 to 12 times on each leg. 6. Over time, work up to holding for 10 to 30 seconds each time. 7. If you feel stable and secure with your leg raised, try raising the opposite arm straight out in front of you at the same time. Knee-to-chest exercise    1. Lie on your back with your knees bent and your feet flat on the floor. 2. Bring one knee to your chest, keeping the other foot flat on the floor (or keeping the other leg straight, whichever feels better on your lower back). 3. Keep your lower back pressed to the floor. Hold for at least 15 to 30 seconds. 4. Relax, and lower the knee to the starting position. 5. Repeat with the other leg. Repeat 2 to 4 times with each leg.   6. To get

## 2018-01-23 NOTE — PROGRESS NOTES
[address] [facility] PRIMARY CARE  [address] [place] [phone]  Dept: [phone]  Dept Fax: : [phone]   Chief Complaint  Chief Complaint   Patient presents with    Back Pain     follow up, states the pain goes across his lower back, having a heard time sleeping        HPI:  [de-identified] y.o. male who presents for back pain:     Back pain: worsening pain over the past month or two. No heavy lifting. Hurts all the time. Pain goes to the buttocks b/l. No leg pain/weakness. Past Medical History:   Diagnosis Date    [facility]     Carcinoma of ascending colon ([facility] 2014    CHF (congestive heart failure) (HCC)     Chronic atrial fibrillation ([facility] 3/3/2016    Dementia     Foot ulcer, left ([facility] 3/3/2016    Hyperlipidemia     Hypertension     Hypothyroidism     Type II or unspecified type diabetes mellitus without mention of complication, not stated as uncontrolled     diet     Past Surgical History:   Procedure Laterality Date    COLONOSCOPY  5/9/14    bx     OTHER SURGICAL HISTORY  7/29/14    Right colectomy with primary anastomosis and liver bx    RIGHT COLECTOMY  07/29/14    ANDRASKO    UPPER GASTROINTESTINAL ENDOSCOPY  5/9/14    bx ike     Social History     Social History    Marital status:      Spouse name: N/A    Number of children: N/A    Years of education: N/A     Occupational History    Not on file.      Social History Main Topics    Smoking status: Never Smoker    Smokeless tobacco: Never Used    Alcohol use No    Drug use: No    Sexual activity: No     Other Topics Concern    Not on file     Social History Narrative    No narrative on file     No Known Allergies  Current Outpatient Prescriptions   Medication Sig Dispense Refill    cyclobenzaprine (FLEXERIL) 5 MG tablet Take 1 tablet by mouth 2 times daily as needed for Muscle spasms 30 tablet 1    traMADol daily. No current facility-administered medications for this visit. ROS:  Review of Systems   Constitutional: Negative for chills and fever. HENT: Negative for rhinorrhea and sore throat. Respiratory: Negative for cough, shortness of breath and wheezing. Gastrointestinal: Negative for abdominal pain, constipation and diarrhea. Endocrine: Negative for polydipsia and polyuria. Genitourinary: Negative for dysuria, frequency and urgency. Musculoskeletal: Positive for back pain. Neurological: Negative for syncope, light-headedness, numbness and headaches. Psychiatric/Behavioral: Negative for sleep disturbance. The patient is not nervous/anxious. Vitals:    01/23/18 1428   BP: 130/80   Site: Left Arm   Position: Sitting   Cuff Size: Large Adult   Pulse: 78   Temp: 97.9 °F (36.6 °C)   TempSrc: Oral   SpO2: 98%   Weight: 284 lb 6.4 oz (129 kg)       Physical exam:  Physical Exam   Constitutional: He is oriented to person, place, and time. He appears well-developed and well-nourished. No distress. HENT:   Head: Normocephalic and atraumatic. Mouth/Throat: No oropharyngeal exudate. Eyes: EOM are normal.   Neck: Normal range of motion. Cardiovascular: Normal rate, regular rhythm and normal heart sounds. No murmur heard. Pulmonary/Chest: Effort normal and breath sounds normal. No respiratory distress. He has no wheezes. Abdominal: Soft. He exhibits no distension. There is no tenderness. There is no rebound and no guarding. Neurological: He is alert and oriented to person, place, and time. Skin: Skin is warm and dry. Psychiatric: He has a normal mood and affect. His behavior is normal.   Vitals reviewed. Assessment/Plan:  [de-identified] y.o. male here mainly for LBP pain:  - LBP: avoiding NSAID due to [facility] hx; Avoiding the steroid for now due to the DM2 but if pain worsens then may try this. For now, provided limited tramadol. He is unable to do HEP so sending to PT.  Encouraged

## 2018-02-02 ENCOUNTER — HOSPITAL ENCOUNTER (OUTPATIENT)
Dept: PHYSICAL THERAPY | Age: 83
Setting detail: THERAPIES SERIES
Discharge: HOME | End: 2018-02-02
Payer: MEDICARE

## 2018-02-02 PROCEDURE — G8979 MOBILITY GOAL STATUS: HCPCS

## 2018-02-02 PROCEDURE — 97110 THERAPEUTIC EXERCISES: CPT

## 2018-02-02 PROCEDURE — 97530 THERAPEUTIC ACTIVITIES: CPT

## 2018-02-02 PROCEDURE — G8978 MOBILITY CURRENT STATUS: HCPCS

## 2018-02-02 PROCEDURE — 97162 PT EVAL MOD COMPLEX 30 MIN: CPT

## 2018-02-02 ASSESSMENT — PAIN DESCRIPTION - DESCRIPTORS: DESCRIPTORS: SHARP;ACHING;DULL

## 2018-02-02 ASSESSMENT — PAIN SCALES - GENERAL: PAINLEVEL_OUTOF10: 3

## 2018-02-02 ASSESSMENT — PAIN DESCRIPTION - LOCATION: LOCATION: BACK

## 2018-02-02 NOTE — PROGRESS NOTES
assistance;Supervision; Moderate Assistance (mod A from low toilet, SBA/Supervision from chair with armrests; vcs for safe technique)  Ambulation  Ambulation?: Yes  More Ambulation?: No  Ambulation 1  Surface: level tile;carpet  Device: Rolling Walker  Assistance: Modified Independent;Supervision  Quality of Gait: slow antalgic gait patter flexed at spine and hips ([id number] deg flex at hips and spine due to increased pain in low back when attempting to straighten spine during gait   Distance: 80 feet     Exercises  Exercise 1: APs x x 10  Exercise 2: LAQ 2x5 H 1-2  Exercise 3: seated marching x 10 alternating        Assessment   Conditions Requiring Skilled Therapeutic Intervention  Body structures, Functions, Activity limitations: Decreased functional mobility ; Decreased ROM; Decreased strength;Decreased safe awareness;Decreased balance;Decreased endurance  Assessment: [de-identified] yr old male presents with decline in function requiring occasional assist with transfers from low surfaces, dependent on ww for ambulation and signficant back pain during transfers and gait  Treatment Diagnosis: low back pain, weakness  Prognosis: Good  Patient Education: Pt educated on fall precautions, correct and safe sit to stand transfer with use of ww, educated on postural awareness during gait, recommended taller ww to improve upright posture during gait, Pt instructed in HEp, handout issued to pt  REQUIRES PT FOLLOW UP: Yes         Plan   Plan  Times per week: 2  Plan weeks: 4-6  Current Treatment Recommendations: Strengthening, ROM, Functional Mobility Training, Transfer Training, Gait Training, Manual Therapy - Soft Tissue Mobilization, Home Exercise Program, Safety Education & Training    G-Code  PT G-Codes  Functional Assessment Tool Used: Oswestry  Score: 32/50=64%  Functional Limitation: Mobility: Walking and moving around  Mobility: Walking and Moving Around Current Status ():  At least 60 percent but less than 80 percent impaired, limited or restricted  Mobility: Walking and Moving Around Goal Status ():  At least 1 percent but less than 20 percent impaired, limited or restricted      Goals  Short term goals  Short term goal 1: I with HEP  Short term goal 2: Decrease back pain any amount during transfers and gait  Long term goals  Long term goal 1: Improve Oswestry less than 20%  Long term goal 2: Improve Bilat LE strength 4+/5 or greater to improve transfers and gait  Long term goal 3: Improve Lumbar AROM 50% or greater without pain to allow for improved posture during gait and increased ease with all mobility  Long term goal 4: Pt transfer sit to stand safely and independently consistently on the first attempt with 2-3/10 pain or less  Long term goal 5: Pt perform ADLs including transfers, walking, selfcare activities and activies at adult day care with 3/10 pain or less       Therapy Time   Individual Concurrent Group Co-treatment   Time In  900         Time Out  1005         Minutes  [address], [place], [address] REDDY, [place]

## 2018-02-03 DIAGNOSIS — E78.5 HYPERLIPIDEMIA, UNSPECIFIED HYPERLIPIDEMIA TYPE: ICD-10-CM

## 2018-02-03 DIAGNOSIS — I50.9 CONGESTIVE HEART FAILURE, UNSPECIFIED CONGESTIVE HEART FAILURE CHRONICITY, UNSPECIFIED CONGESTIVE HEART FAILURE TYPE: ICD-10-CM

## 2018-02-03 DIAGNOSIS — I10 ESSENTIAL HYPERTENSION: ICD-10-CM

## 2018-02-03 DIAGNOSIS — E87.6 HYPOKALEMIA: ICD-10-CM

## 2018-02-05 RX ORDER — FENOFIBRATE 160 MG/1
TABLET ORAL
Qty: 90 TABLET | Refills: 2 | Status: SHIPPED | OUTPATIENT
Start: 2018-02-05 | End: 2018-11-26

## 2018-02-05 RX ORDER — NISOLDIPINE 8.5 MG/1
TABLET, FILM COATED, EXTENDED RELEASE ORAL
Qty: 90 TABLET | Refills: 2 | Status: SHIPPED | OUTPATIENT
Start: 2018-02-05 | End: 2018-11-24 | Stop reason: SDUPTHER

## 2018-02-05 RX ORDER — METOLAZONE 5 MG/1
TABLET ORAL
Qty: 90 TABLET | Refills: 2 | Status: ON HOLD | OUTPATIENT
Start: 2018-02-05 | End: 2018-10-20 | Stop reason: HOSPADM

## 2018-02-05 RX ORDER — POTASSIUM CHLORIDE 20 MEQ/1
TABLET, EXTENDED RELEASE ORAL
Qty: 225 TABLET | Refills: 2 | Status: SHIPPED | OUTPATIENT
Start: 2018-02-05 | End: 2018-04-02 | Stop reason: SDUPTHER

## 2018-02-05 NOTE — TELEPHONE ENCOUNTER
Medication: Fenofibrate, Klor-Con, Sular & Zaroxolyn    Last office visit: 1/23/18    Last filled: 8/5/17

## 2018-02-09 ENCOUNTER — HOSPITAL ENCOUNTER (OUTPATIENT)
Dept: PHYSICAL THERAPY | Age: 83
Setting detail: THERAPIES SERIES
Discharge: HOME | End: 2018-02-09
Payer: MEDICARE

## 2018-02-09 ENCOUNTER — HOSPITAL ENCOUNTER (OUTPATIENT)
Dept: LAB | Age: 83
Discharge: HOME | End: 2018-02-09
Payer: MEDICARE

## 2018-02-09 DIAGNOSIS — I48.20 CHRONIC ATRIAL FIBRILLATION (HCC): Primary | ICD-10-CM

## 2018-02-09 DIAGNOSIS — Z79.01 ANTICOAGULANT LONG-TERM USE: ICD-10-CM

## 2018-02-09 DIAGNOSIS — I48.20 CHRONIC ATRIAL FIBRILLATION (HCC): ICD-10-CM

## 2018-02-09 LAB
INR BLD: 3
PROTHROMBIN TIME: 31.8 SEC (ref 8.1–13.7)

## 2018-02-09 PROCEDURE — 85610 PROTHROMBIN TIME: CPT

## 2018-02-09 PROCEDURE — 36415 COLL VENOUS BLD VENIPUNCTURE: CPT

## 2018-02-09 PROCEDURE — 97530 THERAPEUTIC ACTIVITIES: CPT

## 2018-02-09 NOTE — PROGRESS NOTES
Physical Therapy  Discharge/ lift chair Measurement/Daily Treatment Note  Date: 2018  Patient Name: Vilma Camacho  MRN: [id number]     :   1935    Subjective:   General  Chart Reviewed: Yes  Family / Caregiver Present: Yes  Referring Practitioner: Dr. Abiola Prabhakar  PT Visit Information  Total # of Visits Approved: 1 (eval and measurement eval for lift chair per MD)  Total # of Visits to Date: 2  Subjective  Subjective: I am here for my lift chair measurements. I thought my appt was at 100 pt was scheduled at 1200, pt seen for measurements  General Comment  Comments: Pt has a lift chair beyond repair and in need of new chair, MD requested measurements. (lenght upper leg L and R 20 inches)  Patient Observation  Observations: seee measurements (hip width 21 inches; knee to heel/floor L19.25,R 19 inch)     sacrum to popliteal fossa 20inch left and right  Shoulder to to seat surface 23 in left and 23 1/4 right  Treatment Activities:    measurements taken for lift chair this date      Assessment:        measurements taken for lift chair this date, see separate form. No further PT needs at this time. Goals:  Short term goals  Short term goal 1: I with HEP (Met pt reports daily)  Short term goal 2: Decrease back pain any amount during transfers and gait (P Met 8/10)  Short term goal 3: COmplete measurements for lift chiar per MD.  Completed 18    Plan:     Discharge to [facility]; measurements taken as per orders.         Therapy Time   Individual Concurrent Group Co-treatment   Time In  105         Time Out  [address] [place], [place] [id number]

## 2018-02-13 ENCOUNTER — TELEPHONE (OUTPATIENT)
Dept: FAMILY MEDICINE CLINIC | Age: 83
End: 2018-02-13

## 2018-02-13 DIAGNOSIS — I50.9 CONGESTIVE HEART FAILURE, UNSPECIFIED CONGESTIVE HEART FAILURE CHRONICITY, UNSPECIFIED CONGESTIVE HEART FAILURE TYPE: Primary | ICD-10-CM

## 2018-02-23 ENCOUNTER — TELEPHONE (OUTPATIENT)
Dept: FAMILY MEDICINE CLINIC | Age: 83
End: 2018-02-23

## 2018-02-23 ENCOUNTER — HOSPITAL ENCOUNTER (OUTPATIENT)
Dept: PHYSICAL THERAPY | Age: 83
Setting detail: THERAPIES SERIES
Discharge: HOME | End: 2018-02-23
Payer: MEDICARE

## 2018-02-23 ENCOUNTER — HOSPITAL ENCOUNTER (OUTPATIENT)
Dept: LAB | Age: 83
Discharge: HOME | End: 2018-02-23
Payer: MEDICARE

## 2018-02-23 DIAGNOSIS — I48.20 CHRONIC ATRIAL FIBRILLATION (HCC): Primary | ICD-10-CM

## 2018-02-23 DIAGNOSIS — I48.20 CHRONIC ATRIAL FIBRILLATION (HCC): ICD-10-CM

## 2018-02-23 LAB
INR BLD: 2
PROTHROMBIN TIME: 20.7 SEC (ref 8.1–13.7)

## 2018-02-23 PROCEDURE — 85610 PROTHROMBIN TIME: CPT

## 2018-02-23 PROCEDURE — 97116 GAIT TRAINING THERAPY: CPT

## 2018-02-23 PROCEDURE — 97110 THERAPEUTIC EXERCISES: CPT

## 2018-02-23 PROCEDURE — 36415 COLL VENOUS BLD VENIPUNCTURE: CPT

## 2018-02-23 PROCEDURE — 97530 THERAPEUTIC ACTIVITIES: CPT

## 2018-02-23 ASSESSMENT — PAIN DESCRIPTION - DESCRIPTORS: DESCRIPTORS: THROBBING;ACHING

## 2018-02-23 ASSESSMENT — PAIN DESCRIPTION - LOCATION: LOCATION: BACK;SACRUM

## 2018-02-26 ENCOUNTER — TELEPHONE (OUTPATIENT)
Dept: FAMILY MEDICINE CLINIC | Age: 83
End: 2018-02-26

## 2018-02-26 DIAGNOSIS — E11.9 TYPE 2 DIABETES MELLITUS WITHOUT COMPLICATION, WITHOUT LONG-TERM CURRENT USE OF INSULIN (HCC): ICD-10-CM

## 2018-02-26 DIAGNOSIS — I10 ESSENTIAL HYPERTENSION: ICD-10-CM

## 2018-02-26 DIAGNOSIS — E78.5 HYPERLIPIDEMIA, UNSPECIFIED HYPERLIPIDEMIA TYPE: Primary | ICD-10-CM

## 2018-02-26 NOTE — TELEPHONE ENCOUNTER
Pt last seen by Dr Dora Livingston on 1/23/2018, Pt's daughter would like him to have fasting labs done. Pt goes to [facility] lab monthly for his INR and she would like to have them done next month. Pt's last fasting labs in August of 2016, Orders pended in case you would like something different.

## 2018-03-09 ENCOUNTER — HOSPITAL ENCOUNTER (OUTPATIENT)
Dept: PHYSICAL THERAPY | Age: 83
Setting detail: THERAPIES SERIES
Discharge: HOME | End: 2018-03-09
Payer: MEDICARE

## 2018-03-30 ENCOUNTER — HOSPITAL ENCOUNTER (OUTPATIENT)
Age: 83
Setting detail: SPECIMEN
Discharge: HOME | End: 2018-03-30
Payer: MEDICARE

## 2018-03-30 ENCOUNTER — TELEPHONE (OUTPATIENT)
Dept: FAMILY MEDICINE CLINIC | Age: 83
End: 2018-03-30

## 2018-03-30 ENCOUNTER — OFFICE VISIT (OUTPATIENT)
Dept: FAMILY MEDICINE CLINIC | Age: 83
End: 2018-03-30
Payer: MEDICARE

## 2018-03-30 VITALS
WEIGHT: 263.4 LBS | TEMPERATURE: 97.9 F | DIASTOLIC BLOOD PRESSURE: 80 MMHG | HEART RATE: 75 BPM | BODY MASS INDEX: 35.72 KG/M2 | SYSTOLIC BLOOD PRESSURE: 130 MMHG | OXYGEN SATURATION: 98 %

## 2018-03-30 DIAGNOSIS — Z23 NEED FOR PROPHYLACTIC VACCINATION AGAINST DIPHTHERIA-TETANUS-PERTUSSIS (DTP): ICD-10-CM

## 2018-03-30 DIAGNOSIS — E11.9 TYPE 2 DIABETES MELLITUS WITHOUT COMPLICATION, WITHOUT LONG-TERM CURRENT USE OF INSULIN (HCC): ICD-10-CM

## 2018-03-30 DIAGNOSIS — C18.9 MALIGNANT NEOPLASM OF COLON, UNSPECIFIED PART OF COLON (HCC): ICD-10-CM

## 2018-03-30 DIAGNOSIS — C18.2 MALIGNANT NEOPLASM OF ASCENDING COLON (HCC): Primary | ICD-10-CM

## 2018-03-30 DIAGNOSIS — E03.9 HYPOTHYROIDISM, UNSPECIFIED TYPE: ICD-10-CM

## 2018-03-30 DIAGNOSIS — I50.9 CONGESTIVE HEART FAILURE, UNSPECIFIED CONGESTIVE HEART FAILURE CHRONICITY, UNSPECIFIED CONGESTIVE HEART FAILURE TYPE: ICD-10-CM

## 2018-03-30 DIAGNOSIS — Z00.00 ANNUAL PHYSICAL EXAM: ICD-10-CM

## 2018-03-30 DIAGNOSIS — Z00.00 ANNUAL PHYSICAL EXAM: Primary | ICD-10-CM

## 2018-03-30 DIAGNOSIS — K74.60 CIRRHOSIS OF LIVER WITHOUT ASCITES, UNSPECIFIED HEPATIC CIRRHOSIS TYPE (HCC): ICD-10-CM

## 2018-03-30 DIAGNOSIS — Z23 NEED FOR PROPHYLACTIC VACCINATION AND INOCULATION AGAINST VARICELLA: ICD-10-CM

## 2018-03-30 DIAGNOSIS — I48.20 CHRONIC ATRIAL FIBRILLATION (HCC): ICD-10-CM

## 2018-03-30 DIAGNOSIS — I10 ESSENTIAL HYPERTENSION: ICD-10-CM

## 2018-03-30 LAB
ALBUMIN SERPL-MCNC: 4 G/DL (ref 3.9–4.9)
ALP BLD-CCNC: 91 U/L (ref 35–104)
ALT SERPL-CCNC: 14 U/L (ref 0–41)
ANION GAP SERPL CALCULATED.3IONS-SCNC: 14 MEQ/L (ref 7–13)
AST SERPL-CCNC: 27 U/L (ref 0–40)
BILIRUB SERPL-MCNC: 1 MG/DL (ref 0–1.2)
BUN BLDV-MCNC: 26 MG/DL (ref 8–23)
CALCIUM SERPL-MCNC: 9.2 MG/DL (ref 8.6–10.2)
CHLORIDE BLD-SCNC: 93 MEQ/L (ref 98–107)
CHOLESTEROL, TOTAL: 143 MG/DL (ref 0–199)
CO2: 32 MEQ/L (ref 22–29)
CREAT SERPL-MCNC: 0.82 MG/DL (ref 0.7–1.2)
GFR AFRICAN AMERICAN: >60
GFR NON-AFRICAN AMERICAN: >60
GLOBULIN: 2.7 G/DL (ref 2.3–3.5)
GLUCOSE BLD-MCNC: 108 MG/DL (ref 74–109)
HBA1C MFR BLD: 5.9 % (ref 4.8–5.9)
HDLC SERPL-MCNC: 37 MG/DL (ref 40–59)
INTERNATIONAL NORMALIZATION RATIO, POC: 3
LDL CHOLESTEROL CALCULATED: 82 MG/DL (ref 0–129)
POTASSIUM SERPL-SCNC: 3.3 MEQ/L (ref 3.5–5.1)
PROTHROMBIN TIME, POC: NORMAL
SODIUM BLD-SCNC: 139 MEQ/L (ref 132–144)
TOTAL PROTEIN: 6.7 G/DL (ref 6.4–8.1)
TRIGL SERPL-MCNC: 119 MG/DL (ref 0–200)
TSH REFLEX: 1.94 UIU/ML (ref 0.27–4.2)

## 2018-03-30 PROCEDURE — G0439 PPPS, SUBSEQ VISIT: HCPCS | Performed by: FAMILY MEDICINE

## 2018-03-30 PROCEDURE — 80061 LIPID PANEL: CPT

## 2018-03-30 PROCEDURE — 83036 HEMOGLOBIN GLYCOSYLATED A1C: CPT

## 2018-03-30 PROCEDURE — 80053 COMPREHEN METABOLIC PANEL: CPT

## 2018-03-30 PROCEDURE — 85610 PROTHROMBIN TIME: CPT | Performed by: FAMILY MEDICINE

## 2018-03-30 PROCEDURE — 84443 ASSAY THYROID STIM HORMONE: CPT

## 2018-03-30 ASSESSMENT — ENCOUNTER SYMPTOMS
SHORTNESS OF BREATH: 0
COUGH: 0
SORE THROAT: 0
WHEEZING: 0
RHINORRHEA: 0
ABDOMINAL PAIN: 0
DIARRHEA: 0
CONSTIPATION: 0

## 2018-03-30 NOTE — PROGRESS NOTES
[facility] [address] PRIMARY CARE  [address] [facility] [address]  Dept: [phone]  Dept Fax: [phone]: [phone]   Chief Complaint  Chief Complaint   Patient presents with    Follow-up     Chronic Health conditions     Blood Work     pt is fasting today for labs     Office Visit for Anticoagulation Management     7.5 mg daily        HPI:  [de-identified] y.o. male who presents for annual exam:    DM2: has been diet controlled for years. Not on any meds. No excessive thirst, urination, or blurry vision. INR: 3.0 today. Takes 7.5mg coumadin daily. No change. Hx of colon cancer and had part of the colon removed. Says he was told he is in remission. CHF: no swelling in the legs. Wears compression stockings. No CP or SOB. Past Medical History:   Diagnosis Date    Atrial fibrillation ([facility] [address]     Carcinoma of ascending colon ([facility] [address] 2014    CHF (congestive heart failure) (HCC)     Chronic atrial fibrillation ([facility] [address] 3/3/2016    Dementia     Foot ulcer, left ([facility] [address] 3/3/2016    Hyperlipidemia     Hypertension     Hypothyroidism     Type II or unspecified type diabetes mellitus without mention of complication, not stated as uncontrolled     diet     Past Surgical History:   Procedure Laterality Date    COLONOSCOPY  5/9/14    bx     OTHER SURGICAL HISTORY  7/29/14    Right colectomy with primary anastomosis and liver bx    RIGHT COLECTOMY  07/29/14    CHRISTINE    UPPER GASTROINTESTINAL ENDOSCOPY  5/9/14    bx ike     Social History     Social History    Marital status:      Spouse name: N/A    Number of children: N/A    Years of education: N/A     Occupational History    Not on file.      Social History Main Topics    Smoking status: Never Smoker    Smokeless tobacco: Never Used    Alcohol use No    Drug use: No    Sexual activity: No     Other Topics Concern    Not on file     Social History Narrative  No narrative on file     No Known Allergies  Current Outpatient Prescriptions   Medication Sig Dispense Refill    Tdap (ADACEL) 5-2-15.5 LF-MCG/0.5 injection Inject 0.5 mLs into the muscle once for 1 dose 0.5 mL 0    zoster recombinant adjuvanted vaccine (SHINGRIX) 50 MCG SUSR injection Inject 0.5 mLs into the muscle once for 1 dose 1 each 0    Misc. Devices MISC One lift chair, to be used daily Diagnosis CHF, obesity 1 each 0    metolazone (ZAROXOLYN) 5 MG tablet TAKE 1 TABLET BY MOUTH DAILY. 90 tablet 2    nisoldipine (SULAR) 8.5 MG extended release tablet TAKE 1 TABLET BY MOUTH EVERY DAY 90 tablet 2    potassium chloride (KLOR-CON M) 20 MEQ extended release tablet Take 2 (TWO) and ONE-HALF tablets by mouth twice daily 225 tablet 2    fenofibrate 160 MG tablet Take 1 tablet by mouth daily Note to pharmacy: Please dispense generic fenofibrate unless prescriber denotes ELDA 90 tablet 2    tamsulosin (FLOMAX) 0.4 MG capsule Take 1 capsule by mouth daily 30 capsule 3    acetaminophen (APAP EXTRA STRENGTH) 500 MG tablet Take 2 tablets by mouth every 8 hours as needed for Pain 120 tablet 0    levothyroxine (SYNTHROID) 112 MCG tablet TAKE 1 TABLET BY MOUTH DAILY.  90 tablet 2    omeprazole (PRILOSEC) 20 MG delayed release capsule Take 1 capsule by mouth daily 90 capsule 2    Lift Chair MISC by Does not apply route 1 each 0    metoprolol tartrate (LOPRESSOR) 50 MG tablet take ONE-HALF tablet once a day 45 tablet 3    furosemide (LASIX) 40 MG tablet Take 1 tablet by mouth daily 90 tablet 2    warfarin (COUMADIN) 7.5 MG tablet Take as directed 90 tablet 3    lovastatin (MEVACOR) 20 MG tablet Take 1 tablet by mouth nightly 90 tablet 3    warfarin (COUMADIN) 5 MG tablet Per coumadin regimen instructions 30 tablet 11    warfarin (COUMADIN) 1 MG tablet Take 1 tablet by mouth daily 90 tablet 3    Hospital Bed MISC by Does not apply route 1 each 0    Lift Chair MISC by Does not apply route 1 each 0    dry.   Psychiatric: He has a normal mood and affect. His behavior is normal.   Vitals reviewed. Assessment/Plan:  [de-identified] y.o. male here mainly for annual exam:  - Due for routine labs; rest of problems are stable. - DM2: diet controlled  - Hx of colon cancer with R hemicolectomy; Per the notes, it looks like he was supposed to follow up with Dr. Heidy Herring, GI, and Onc in 2014 for the R colon cancer and this didn't happen. Will set him up with GI and Onc.  - INR: 3.0; no changes. . Takes 7.5mg coumadin daily. No change    1. Annual physical exam  Comprehensive Metabolic Panel   2. Chronic atrial fibrillation (HCC)  POCT INR    CANCELED: POCT INR   3. Hypothyroidism, unspecified type  TSH with Reflex   4. Congestive heart failure, unspecified congestive heart failure chronicity, unspecified congestive heart failure type ([facility] [address]     5. Need for prophylactic vaccination against diphtheria-tetanus-pertussis (DTP)  Tdap (ADACEL) 5-2-15.5 LF-MCG/0.5 injection   6. Need for prophylactic vaccination and inoculation against varicella  zoster recombinant adjuvanted vaccine (SHINGRIX) 50 MCG SUSR injection    DISCONTINUED: zoster recombinant adjuvanted vaccine (SHINGRIX) 50 MCG SUSR injection   7.  Malignant neoplasm of colon, unspecified part of colon ([facility] [address]          Return in about 1 year (around 3/30/2019) for annual exam.    Ara Soriano MD

## 2018-04-02 DIAGNOSIS — E87.6 HYPOKALEMIA: ICD-10-CM

## 2018-04-02 RX ORDER — POTASSIUM CHLORIDE 20 MEQ/1
60 TABLET, EXTENDED RELEASE ORAL DAILY
Qty: 225 TABLET | Refills: 3 | Status: SHIPPED | OUTPATIENT
Start: 2018-04-02 | End: 2019-02-14 | Stop reason: SDUPTHER

## 2018-04-06 ENCOUNTER — OFFICE VISIT (OUTPATIENT)
Dept: GASTROENTEROLOGY | Age: 83
End: 2018-04-06
Payer: MEDICARE

## 2018-04-06 VITALS
OXYGEN SATURATION: 97 % | HEIGHT: 74 IN | BODY MASS INDEX: 34.39 KG/M2 | HEART RATE: 68 BPM | DIASTOLIC BLOOD PRESSURE: 70 MMHG | WEIGHT: 268 LBS | SYSTOLIC BLOOD PRESSURE: 138 MMHG

## 2018-04-06 DIAGNOSIS — Z85.038 HISTORY OF COLON CANCER: ICD-10-CM

## 2018-04-06 DIAGNOSIS — I48.20 CHRONIC ATRIAL FIBRILLATION (HCC): Primary | ICD-10-CM

## 2018-04-06 DIAGNOSIS — Z90.49 HISTORY OF PARTIAL COLECTOMY: ICD-10-CM

## 2018-04-06 PROCEDURE — 4040F PNEUMOC VAC/ADMIN/RCVD: CPT | Performed by: PHYSICIAN ASSISTANT

## 2018-04-06 PROCEDURE — 1036F TOBACCO NON-USER: CPT | Performed by: PHYSICIAN ASSISTANT

## 2018-04-06 PROCEDURE — 1123F ACP DISCUSS/DSCN MKR DOCD: CPT | Performed by: PHYSICIAN ASSISTANT

## 2018-04-06 PROCEDURE — G8427 DOCREV CUR MEDS BY ELIG CLIN: HCPCS | Performed by: PHYSICIAN ASSISTANT

## 2018-04-06 PROCEDURE — 99203 OFFICE O/P NEW LOW 30 MIN: CPT | Performed by: PHYSICIAN ASSISTANT

## 2018-04-06 PROCEDURE — G8417 CALC BMI ABV UP PARAM F/U: HCPCS | Performed by: PHYSICIAN ASSISTANT

## 2018-04-06 ASSESSMENT — ENCOUNTER SYMPTOMS
VOICE CHANGE: 0
CHEST TIGHTNESS: 0
WHEEZING: 0
SHORTNESS OF BREATH: 0
ABDOMINAL PAIN: 0
VOMITING: 0
SINUS PRESSURE: 0
RHINORRHEA: 0
TROUBLE SWALLOWING: 0
NAUSEA: 0
COUGH: 0
COLOR CHANGE: 0
APNEA: 0

## 2018-05-14 DIAGNOSIS — I50.9 CONGESTIVE HEART FAILURE, UNSPECIFIED CONGESTIVE HEART FAILURE CHRONICITY, UNSPECIFIED CONGESTIVE HEART FAILURE TYPE: ICD-10-CM

## 2018-05-14 DIAGNOSIS — E78.5 HYPERLIPIDEMIA, UNSPECIFIED HYPERLIPIDEMIA TYPE: ICD-10-CM

## 2018-05-15 RX ORDER — LOVASTATIN 20 MG/1
TABLET ORAL
Qty: 90 TABLET | Refills: 3 | Status: SHIPPED | OUTPATIENT
Start: 2018-05-15 | End: 2019-05-11 | Stop reason: SDUPTHER

## 2018-05-15 RX ORDER — FUROSEMIDE 40 MG/1
TABLET ORAL
Qty: 90 TABLET | Refills: 3 | Status: SHIPPED | OUTPATIENT
Start: 2018-05-15 | End: 2019-05-11 | Stop reason: SDUPTHER

## 2018-07-12 DIAGNOSIS — I48.20 CHRONIC ATRIAL FIBRILLATION (HCC): ICD-10-CM

## 2018-07-12 RX ORDER — WARFARIN SODIUM 7.5 MG/1
TABLET ORAL
Qty: 90 TABLET | Refills: 1 | Status: SHIPPED | OUTPATIENT
Start: 2018-07-12 | End: 2018-11-24 | Stop reason: SDUPTHER

## 2018-07-12 NOTE — TELEPHONE ENCOUNTER
Medication: coumadin 7.5 mg    Last office visit: 3/30/2018    Last labs: 3/30/2018 INR    Last filled:unknown

## 2018-07-28 DIAGNOSIS — R35.0 URINARY FREQUENCY: ICD-10-CM

## 2018-07-30 RX ORDER — TAMSULOSIN HYDROCHLORIDE 0.4 MG/1
0.4 CAPSULE ORAL DAILY
Qty: 30 CAPSULE | Refills: 11 | Status: SHIPPED | OUTPATIENT
Start: 2018-07-30 | End: 2019-02-04 | Stop reason: ALTCHOICE

## 2018-08-11 DIAGNOSIS — I50.9 CONGESTIVE HEART FAILURE (HCC): ICD-10-CM

## 2018-08-11 DIAGNOSIS — I48.20 CHRONIC ATRIAL FIBRILLATION (HCC): ICD-10-CM

## 2018-08-13 RX ORDER — METOPROLOL TARTRATE 50 MG/1
TABLET, FILM COATED ORAL
Qty: 45 TABLET | Refills: 5 | Status: ON HOLD | OUTPATIENT
Start: 2018-08-13 | End: 2018-10-20 | Stop reason: HOSPADM

## 2018-08-29 DIAGNOSIS — E03.9 HYPOTHYROIDISM, UNSPECIFIED TYPE: ICD-10-CM

## 2018-08-29 DIAGNOSIS — K21.9 GASTROESOPHAGEAL REFLUX DISEASE WITHOUT ESOPHAGITIS: ICD-10-CM

## 2018-08-29 RX ORDER — OMEPRAZOLE 20 MG/1
CAPSULE, DELAYED RELEASE ORAL
Qty: 90 CAPSULE | Refills: 3 | Status: SHIPPED | OUTPATIENT
Start: 2018-08-29 | End: 2019-08-12 | Stop reason: SDUPTHER

## 2018-08-29 RX ORDER — LEVOTHYROXINE SODIUM 112 UG/1
TABLET ORAL
Qty: 90 TABLET | Refills: 1 | Status: SHIPPED | OUTPATIENT
Start: 2018-08-29 | End: 2019-02-14 | Stop reason: SDUPTHER

## 2018-09-22 ENCOUNTER — HOSPITAL ENCOUNTER (OUTPATIENT)
Dept: LAB | Age: 83
Discharge: HOME | End: 2018-09-22
Payer: MEDICARE

## 2018-09-22 DIAGNOSIS — I48.20 CHRONIC ATRIAL FIBRILLATION (HCC): ICD-10-CM

## 2018-09-22 LAB
INR BLD: 2
PROTHROMBIN TIME: 21.4 SEC (ref 9.6–12.3)

## 2018-09-22 PROCEDURE — 85610 PROTHROMBIN TIME: CPT

## 2018-09-22 PROCEDURE — 36415 COLL VENOUS BLD VENIPUNCTURE: CPT

## 2018-10-15 ENCOUNTER — APPOINTMENT (OUTPATIENT)
Dept: CT IMAGING | Age: 83
End: 2018-10-15
Payer: MEDICARE

## 2018-10-15 ENCOUNTER — HOSPITAL ENCOUNTER (EMERGENCY)
Age: 83
Discharge: ANOTHER HOSPITAL | End: 2018-10-15
Attending: INTERNAL MEDICINE
Payer: MEDICARE

## 2018-10-15 ENCOUNTER — APPOINTMENT (OUTPATIENT)
Dept: CT IMAGING | Age: 83
DRG: 071 | End: 2018-10-15
Attending: INTERNAL MEDICINE
Payer: MEDICARE

## 2018-10-15 ENCOUNTER — HOSPITAL ENCOUNTER (INPATIENT)
Age: 83
LOS: 5 days | Discharge: HOME | DRG: 071 | End: 2018-10-20
Attending: INTERNAL MEDICINE | Admitting: INTERNAL MEDICINE
Payer: MEDICARE

## 2018-10-15 ENCOUNTER — APPOINTMENT (OUTPATIENT)
Dept: GENERAL RADIOLOGY | Age: 83
DRG: 071 | End: 2018-10-15
Attending: INTERNAL MEDICINE
Payer: MEDICARE

## 2018-10-15 ENCOUNTER — APPOINTMENT (OUTPATIENT)
Dept: GENERAL RADIOLOGY | Age: 83
End: 2018-10-15
Payer: MEDICARE

## 2018-10-15 VITALS
HEART RATE: 56 BPM | BODY MASS INDEX: 34.41 KG/M2 | OXYGEN SATURATION: 96 % | DIASTOLIC BLOOD PRESSURE: 57 MMHG | RESPIRATION RATE: 16 BRPM | SYSTOLIC BLOOD PRESSURE: 113 MMHG | HEIGHT: 74 IN | TEMPERATURE: 97.8 F

## 2018-10-15 DIAGNOSIS — A41.9 SEPTICEMIA (HCC): ICD-10-CM

## 2018-10-15 DIAGNOSIS — E86.0 DEHYDRATION: ICD-10-CM

## 2018-10-15 DIAGNOSIS — R41.0 CONFUSION: Primary | ICD-10-CM

## 2018-10-15 PROBLEM — G93.40 ACUTE ENCEPHALOPATHY: Status: ACTIVE | Noted: 2018-10-15

## 2018-10-15 LAB
ALBUMIN SERPL-MCNC: 3.5 G/DL (ref 3.9–4.9)
ALBUMIN SERPL-MCNC: 3.9 G/DL (ref 3.9–4.9)
ALP BLD-CCNC: 75 U/L (ref 35–104)
ALP BLD-CCNC: 78 U/L (ref 35–104)
ALT SERPL-CCNC: 17 U/L (ref 0–41)
ALT SERPL-CCNC: 18 U/L (ref 0–41)
ANION GAP SERPL CALCULATED.3IONS-SCNC: 17 MEQ/L (ref 7–13)
ANION GAP SERPL CALCULATED.3IONS-SCNC: 21 MEQ/L (ref 7–13)
AST SERPL-CCNC: 50 U/L (ref 0–40)
AST SERPL-CCNC: 51 U/L (ref 0–40)
BANDED NEUTROPHILS RELATIVE PERCENT: 17 %
BASOPHILS ABSOLUTE: 0 K/UL (ref 0–0.2)
BASOPHILS RELATIVE PERCENT: 0 %
BILIRUB SERPL-MCNC: 1 MG/DL (ref 0–1.2)
BILIRUB SERPL-MCNC: 1.2 MG/DL (ref 0–1.2)
BILIRUB SERPL-MCNC: 1.9 MG/DL (ref 0–1.2)
BILIRUBIN DIRECT: 0.4 MG/DL (ref 0–0.3)
BILIRUBIN URINE: ABNORMAL
BLOOD, URINE: NEGATIVE
BUN BLDV-MCNC: 31 MG/DL (ref 8–23)
BUN BLDV-MCNC: 31 MG/DL (ref 8–23)
CALCIUM SERPL-MCNC: 9.1 MG/DL (ref 8.6–10.2)
CALCIUM SERPL-MCNC: 9.4 MG/DL (ref 8.6–10.2)
CHLORIDE BLD-SCNC: 93 MEQ/L (ref 98–107)
CHLORIDE BLD-SCNC: 96 MEQ/L (ref 98–107)
CHP ED QC CHECK: YES
CHP ED QC CHECK: YES
CLARITY: CLEAR
CO2: 24 MEQ/L (ref 22–29)
CO2: 26 MEQ/L (ref 22–29)
COLOR: ABNORMAL
CREAT SERPL-MCNC: 1.04 MG/DL (ref 0.7–1.2)
CREAT SERPL-MCNC: 1.07 MG/DL (ref 0.7–1.2)
EKG ATRIAL RATE: 326 BPM
EKG ATRIAL RATE: 76 BPM
EKG Q-T INTERVAL: 426 MS
EKG Q-T INTERVAL: 466 MS
EKG QRS DURATION: 102 MS
EKG QRS DURATION: 98 MS
EKG QTC CALCULATION (BAZETT): 479 MS
EKG QTC CALCULATION (BAZETT): 488 MS
EKG R AXIS: 53 DEGREES
EKG R AXIS: 56 DEGREES
EKG T AXIS: 34 DEGREES
EKG T AXIS: 49 DEGREES
EKG VENTRICULAR RATE: 66 BPM
EKG VENTRICULAR RATE: 76 BPM
EOSINOPHILS ABSOLUTE: 0 K/UL (ref 0–0.7)
EOSINOPHILS RELATIVE PERCENT: 0 %
GFR AFRICAN AMERICAN: >60
GFR AFRICAN AMERICAN: >60
GFR NON-AFRICAN AMERICAN: >60
GFR NON-AFRICAN AMERICAN: >60
GLOBULIN: 3.4 G/DL (ref 2.3–3.5)
GLOBULIN: 3.6 G/DL (ref 2.3–3.5)
GLUCOSE BLD-MCNC: 150 MG/DL (ref 74–109)
GLUCOSE BLD-MCNC: 161 MG/DL
GLUCOSE BLD-MCNC: 161 MG/DL
GLUCOSE BLD-MCNC: 161 MG/DL (ref 60–115)
GLUCOSE BLD-MCNC: 168 MG/DL (ref 74–109)
GLUCOSE URINE: NEGATIVE MG/DL
HBA1C MFR BLD: 5.3 % (ref 4.8–5.9)
HCT VFR BLD CALC: 44.2 % (ref 42–52)
HEMOGLOBIN: 15.2 G/DL (ref 14–18)
INR BLD: 2.6
KETONES, URINE: ABNORMAL MG/DL
LACTIC ACID, SEPSIS: 1.7 MMOL/L (ref 0.5–1.9)
LACTIC ACID, SEPSIS: 1.9 MMOL/L (ref 0.5–1.9)
LACTIC ACID: 2.4 MMOL/L (ref 0.5–2.2)
LEUKOCYTE ESTERASE, URINE: NEGATIVE
LYMPHOCYTES ABSOLUTE: 1.5 K/UL (ref 1–4.8)
LYMPHOCYTES RELATIVE PERCENT: 6 %
MAGNESIUM: 1.7 MG/DL (ref 1.7–2.3)
MCH RBC QN AUTO: 31.4 PG (ref 27–31.3)
MCHC RBC AUTO-ENTMCNC: 34.3 % (ref 33–37)
MCV RBC AUTO: 91.4 FL (ref 80–100)
MONOCYTES ABSOLUTE: 0.2 K/UL (ref 0.2–0.8)
MONOCYTES RELATIVE PERCENT: 1 %
NEUTROPHILS ABSOLUTE: 23.3 K/UL (ref 1.4–6.5)
NEUTROPHILS RELATIVE PERCENT: 76 %
NITRITE, URINE: NEGATIVE
PDW BLD-RTO: 14.6 % (ref 11.5–14.5)
PERFORMED ON: ABNORMAL
PH UA: 5 (ref 5–9)
PLATELET # BLD: 180 K/UL (ref 130–400)
POTASSIUM REFLEX MAGNESIUM: 3.1 MEQ/L (ref 3.5–5.1)
POTASSIUM SERPL-SCNC: 3.5 MEQ/L (ref 3.5–5.1)
PROCALCITONIN: 4.48 NG/ML (ref 0–0.15)
PROTEIN UA: ABNORMAL MG/DL
PROTHROMBIN TIME: 26.9 SEC (ref 9.6–12.3)
RBC # BLD: 4.84 M/UL (ref 4.7–6.1)
SLIDE REVIEW: ABNORMAL
SODIUM BLD-SCNC: 138 MEQ/L (ref 132–144)
SODIUM BLD-SCNC: 139 MEQ/L (ref 132–144)
SPECIFIC GRAVITY UA: 1.02 (ref 1–1.03)
TOTAL PROTEIN: 6.9 G/DL (ref 6.4–8.1)
TOTAL PROTEIN: 7.5 G/DL (ref 6.4–8.1)
TROPONIN: <0.01 NG/ML (ref 0–0.01)
URINE REFLEX TO CULTURE: ABNORMAL
UROBILINOGEN, URINE: 1 E.U./DL
WBC # BLD: 25 K/UL (ref 4.8–10.8)

## 2018-10-15 PROCEDURE — 82248 BILIRUBIN DIRECT: CPT

## 2018-10-15 PROCEDURE — 85025 COMPLETE CBC W/AUTO DIFF WBC: CPT

## 2018-10-15 PROCEDURE — 87040 BLOOD CULTURE FOR BACTERIA: CPT

## 2018-10-15 PROCEDURE — 72131 CT LUMBAR SPINE W/O DYE: CPT

## 2018-10-15 PROCEDURE — 36415 COLL VENOUS BLD VENIPUNCTURE: CPT

## 2018-10-15 PROCEDURE — 83605 ASSAY OF LACTIC ACID: CPT

## 2018-10-15 PROCEDURE — 80053 COMPREHEN METABOLIC PANEL: CPT

## 2018-10-15 PROCEDURE — 6360000002 HC RX W HCPCS: Performed by: INTERNAL MEDICINE

## 2018-10-15 PROCEDURE — 93005 ELECTROCARDIOGRAM TRACING: CPT

## 2018-10-15 PROCEDURE — 96374 THER/PROPH/DIAG INJ IV PUSH: CPT

## 2018-10-15 PROCEDURE — 85610 PROTHROMBIN TIME: CPT

## 2018-10-15 PROCEDURE — 74177 CT ABD & PELVIS W/CONTRAST: CPT

## 2018-10-15 PROCEDURE — 70450 CT HEAD/BRAIN W/O DYE: CPT

## 2018-10-15 PROCEDURE — 2580000003 HC RX 258: Performed by: INTERNAL MEDICINE

## 2018-10-15 PROCEDURE — 2580000003 HC RX 258: Performed by: PHYSICIAN ASSISTANT

## 2018-10-15 PROCEDURE — 99285 EMERGENCY DEPT VISIT HI MDM: CPT

## 2018-10-15 PROCEDURE — 71045 X-RAY EXAM CHEST 1 VIEW: CPT

## 2018-10-15 PROCEDURE — 82247 BILIRUBIN TOTAL: CPT

## 2018-10-15 PROCEDURE — 84145 PROCALCITONIN (PCT): CPT

## 2018-10-15 PROCEDURE — 1210000000 HC MED SURG R&B

## 2018-10-15 PROCEDURE — 6370000000 HC RX 637 (ALT 250 FOR IP): Performed by: PHYSICIAN ASSISTANT

## 2018-10-15 PROCEDURE — 81003 URINALYSIS AUTO W/O SCOPE: CPT

## 2018-10-15 PROCEDURE — S0028 INJECTION, FAMOTIDINE, 20 MG: HCPCS | Performed by: PHYSICIAN ASSISTANT

## 2018-10-15 PROCEDURE — 2500000003 HC RX 250 WO HCPCS: Performed by: PHYSICIAN ASSISTANT

## 2018-10-15 PROCEDURE — 83735 ASSAY OF MAGNESIUM: CPT

## 2018-10-15 PROCEDURE — 71260 CT THORAX DX C+: CPT

## 2018-10-15 PROCEDURE — 6360000004 HC RX CONTRAST MEDICATION: Performed by: PHYSICIAN ASSISTANT

## 2018-10-15 PROCEDURE — 96361 HYDRATE IV INFUSION ADD-ON: CPT

## 2018-10-15 PROCEDURE — 6360000002 HC RX W HCPCS: Performed by: PHYSICIAN ASSISTANT

## 2018-10-15 PROCEDURE — 84484 ASSAY OF TROPONIN QUANT: CPT

## 2018-10-15 PROCEDURE — 83036 HEMOGLOBIN GLYCOSYLATED A1C: CPT

## 2018-10-15 RX ORDER — ACETAMINOPHEN 325 MG/1
650 TABLET ORAL EVERY 4 HOURS PRN
Status: DISCONTINUED | OUTPATIENT
Start: 2018-10-15 | End: 2018-10-20 | Stop reason: HOSPADM

## 2018-10-15 RX ORDER — DEXTROSE MONOHYDRATE 50 MG/ML
100 INJECTION, SOLUTION INTRAVENOUS PRN
Status: DISCONTINUED | OUTPATIENT
Start: 2018-10-15 | End: 2018-10-20 | Stop reason: HOSPADM

## 2018-10-15 RX ORDER — SODIUM CHLORIDE 9 MG/ML
INJECTION, SOLUTION INTRAVENOUS CONTINUOUS
Status: DISCONTINUED | OUTPATIENT
Start: 2018-10-15 | End: 2018-10-19

## 2018-10-15 RX ORDER — AMMONIUM LACTATE 12 G/100G
LOTION TOPICAL DAILY
Status: DISCONTINUED | OUTPATIENT
Start: 2018-10-15 | End: 2018-10-20 | Stop reason: HOSPADM

## 2018-10-15 RX ORDER — AMLODIPINE BESYLATE 2.5 MG/1
2.5 TABLET ORAL DAILY
Status: DISCONTINUED | OUTPATIENT
Start: 2018-10-15 | End: 2018-10-20 | Stop reason: HOSPADM

## 2018-10-15 RX ORDER — 0.9 % SODIUM CHLORIDE 0.9 %
500 INTRAVENOUS SOLUTION INTRAVENOUS ONCE
Status: COMPLETED | OUTPATIENT
Start: 2018-10-15 | End: 2018-10-15

## 2018-10-15 RX ORDER — SODIUM CHLORIDE 0.9 % (FLUSH) 0.9 %
10 SYRINGE (ML) INJECTION EVERY 12 HOURS SCHEDULED
Status: DISCONTINUED | OUTPATIENT
Start: 2018-10-15 | End: 2018-10-20 | Stop reason: HOSPADM

## 2018-10-15 RX ORDER — SODIUM CHLORIDE 0.9 % (FLUSH) 0.9 %
10 SYRINGE (ML) INJECTION PRN
Status: DISCONTINUED | OUTPATIENT
Start: 2018-10-15 | End: 2018-10-20 | Stop reason: HOSPADM

## 2018-10-15 RX ORDER — SODIUM CHLORIDE 0.9 % (FLUSH) 0.9 %
3 SYRINGE (ML) INJECTION EVERY 8 HOURS
Status: DISCONTINUED | OUTPATIENT
Start: 2018-10-15 | End: 2018-10-15 | Stop reason: HOSPADM

## 2018-10-15 RX ORDER — SIMVASTATIN 10 MG
10 TABLET ORAL NIGHTLY
Status: DISCONTINUED | OUTPATIENT
Start: 2018-10-15 | End: 2018-10-20 | Stop reason: HOSPADM

## 2018-10-15 RX ORDER — ASPIRIN 81 MG/1
81 TABLET, CHEWABLE ORAL DAILY
Status: DISCONTINUED | OUTPATIENT
Start: 2018-10-15 | End: 2018-10-20 | Stop reason: HOSPADM

## 2018-10-15 RX ORDER — TAMSULOSIN HYDROCHLORIDE 0.4 MG/1
0.4 CAPSULE ORAL DAILY
Status: DISCONTINUED | OUTPATIENT
Start: 2018-10-15 | End: 2018-10-20 | Stop reason: HOSPADM

## 2018-10-15 RX ORDER — DEXTROSE MONOHYDRATE 25 G/50ML
12.5 INJECTION, SOLUTION INTRAVENOUS PRN
Status: DISCONTINUED | OUTPATIENT
Start: 2018-10-15 | End: 2018-10-20 | Stop reason: HOSPADM

## 2018-10-15 RX ORDER — NICOTINE POLACRILEX 4 MG
15 LOZENGE BUCCAL PRN
Status: DISCONTINUED | OUTPATIENT
Start: 2018-10-15 | End: 2018-10-20 | Stop reason: HOSPADM

## 2018-10-15 RX ORDER — LEVOTHYROXINE SODIUM 112 UG/1
112 TABLET ORAL DAILY
Status: DISCONTINUED | OUTPATIENT
Start: 2018-10-15 | End: 2018-10-20 | Stop reason: HOSPADM

## 2018-10-15 RX ADMIN — ACETAMINOPHEN 650 MG: 325 TABLET ORAL at 21:58

## 2018-10-15 RX ADMIN — CEFTRIAXONE 1 G: 1 INJECTION, POWDER, FOR SOLUTION INTRAMUSCULAR; INTRAVENOUS at 07:07

## 2018-10-15 RX ADMIN — VANCOMYCIN HYDROCHLORIDE 1500 MG: 5 INJECTION, POWDER, LYOPHILIZED, FOR SOLUTION INTRAVENOUS at 14:30

## 2018-10-15 RX ADMIN — SODIUM CHLORIDE: 9 INJECTION, SOLUTION INTRAVENOUS at 14:30

## 2018-10-15 RX ADMIN — IOPAMIDOL 100 ML: 755 INJECTION, SOLUTION INTRAVENOUS at 18:35

## 2018-10-15 RX ADMIN — Medication 3 ML: at 06:32

## 2018-10-15 RX ADMIN — SODIUM CHLORIDE 500 ML: 9 INJECTION, SOLUTION INTRAVENOUS at 06:30

## 2018-10-15 RX ADMIN — SIMVASTATIN 10 MG: 10 TABLET, FILM COATED ORAL at 21:57

## 2018-10-15 RX ADMIN — Medication: at 21:58

## 2018-10-15 RX ADMIN — WARFARIN SODIUM 7.5 MG: 2.5 TABLET ORAL at 21:58

## 2018-10-15 RX ADMIN — FAMOTIDINE 20 MG: 10 INJECTION, SOLUTION INTRAVENOUS at 14:30

## 2018-10-15 ASSESSMENT — PAIN SCALES - GENERAL
PAINLEVEL_OUTOF10: 0
PAINLEVEL_OUTOF10: 3
PAINLEVEL_OUTOF10: 0

## 2018-10-15 NOTE — H&P
 UPPER GASTROINTESTINAL ENDOSCOPY  5/9/14    bx ike     FAMILY HISTORY:    Family History   Problem Relation Age of Onset    Heart Attack Mother     Other Father         Unknown    Cancer Sister         Cervical     SOCIAL HISTORY:    Social History     Social History    Marital status:      Spouse name: N/A    Number of children: N/A    Years of education: N/A     Occupational History    Not on file. Social History Main Topics    Smoking status: Never Smoker    Smokeless tobacco: Never Used    Alcohol use No    Drug use: No    Sexual activity: No     Other Topics Concern    Not on file     Social History Narrative    No narrative on file     MEDICATIONS:   Prior to Admission medications    Medication Sig Start Date End Date Taking? Authorizing Provider   levothyroxine (SYNTHROID) 112 MCG tablet TAKE 1 TABLET BY MOUTH DAILY. 8/29/18   Nolberto Becerril MD   omeprazole (PRILOSEC) 20 MG delayed release capsule Take 1 capsule by mouth daily 8/29/18   Nolberto Becerril MD   metoprolol tartrate (LOPRESSOR) 50 MG tablet take ONE-HALF tablet once a day 8/13/18   Nolberto Becerril MD   tamsulosin [facility]) 0.4 MG capsule Take 1 capsule by mouth daily 7/30/18   Nolberto Becerril MD   warfarin (COUMADIN) 7.5 MG tablet Take as directed 7/12/18   Nolberto Becerril, MD   furosemide (LASIX) 40 MG tablet Take 1 tablet by mouth daily 5/15/18   Nolberto Becerril MD   lovastatin (MEVACOR) 20 MG tablet Take 1 tablet by mouth nightly 5/15/18   Nolberto Becerril MD   potassium chloride (KLOR-CON M) 20 MEQ extended release tablet Take 3 tablets by mouth daily 4/2/18   Nolberto Becerril MD   Misc. Devices MISC One lift chair, to be used daily Diagnosis CHF, obesity 2/14/18   ALEX Brown   metolazone (ZAROXOLYN) 5 MG tablet TAKE 1 TABLET BY MOUTH DAILY.  2/5/18   Nolberto Becerril MD   nisoldipine (SULAR) 8.5 MG extended release tablet TAKE 1 TABLET BY MOUTH EVERY DAY 2/5/18   Nolberto Becerril MD   fenofibrate 160 MG tablet

## 2018-10-15 NOTE — ED NOTES
Dr. Mahogany Geiger returned call at 09:42.  [facility]     Skylar Binning  10/15/18 [id number]

## 2018-10-15 NOTE — PROGRESS NOTES
Pharmacy Note  Vancomycin Consult    Jennie Negro is a [de-identified] y.o. male started on Vancomycin for sepsis; consult received from SHERIE Mclaughlin to manage therapy. Also receiving the following antibiotics: Zosyn. Patient Active Problem List   Diagnosis    Type 2 diabetes mellitus without complication ([facility] [address]    Essential hypertension    Hyperlipidemia    Malignant neoplasm of ascending colon (HCC)    Cirrhosis (HCC)    Chronic atrial fibrillation (HCC)    Hypothyroidism    CHF (congestive heart failure) (HCC)    History of colon cancer    History of partial colectomy    Acute encephalopathy    Sepsis ([facility] [address]       Allergies:  Patient has no known allergies. Recent Labs      10/15/18   0627   BUN  31*       Recent Labs      10/15/18   0627   CREATININE  1.07       Recent Labs      10/15/18   0627   WBC  25.0*       No intake or output data in the 24 hours ending 10/15/18 1333        Ht Readings from Last 1 Encounters:   10/15/18 6' 2\" (1.88 m)        Wt Readings from Last 1 Encounters:   10/15/18 243 lb 2.7 oz (110.3 kg)         Body mass index is 31.22 kg/m². Estimated Creatinine Clearance: 73 mL/min (based on SCr of 1.07 mg/dL). Goal Trough Level: 15-20 mcg/mL    Assessment/Plan:  Will initiate vancomycin 1500mg (13.6mg/kg)  mg IV every 12 hours. Timing of trough level will be determined based on culture results, renal function, and clinical response. Trough prior to 4th dose. Thank you for the consult. Will continue to follow.    Norris Garibay [facility]  10/15/18  1:35 PM

## 2018-10-15 NOTE — ED PROVIDER NOTES
or T-wave changes are noted. RADIOLOGY:   Non-plain film images such as CT, Ultrasoundand MRI are read by the radiologist. Plain radiographic images are visualized and preliminarily interpreted by the emergency physician with the below findings:    Chest xray:    Interpretation per the Radiologist below, if available at the time of this note:    XR CHEST PORTABLE    (Results Pending)   CT Head WO Contrast    (Results Pending)         ED BEDSIDE ULTRASOUND:   Performed by ED Physician - none    LABS:  Labs Reviewed   URINE RT REFLEX TO CULTURE - Abnormal; Notable for the following:        Result Value    Color, UA REBECCA (*)     Bilirubin Urine SMALL (*)     Ketones, Urine TRACE (*)     Protein, UA TRACE (*)     All other components within normal limits   COMPREHENSIVE METABOLIC PANEL - Abnormal; Notable for the following:     Chloride 93 (*)     Anion Gap 21 (*)     Glucose 168 (*)     BUN 31 (*)     Total Bilirubin 1.9 (*)     AST 51 (*)     Globulin 3.6 (*)     All other components within normal limits   CBC WITH AUTO DIFFERENTIAL - Abnormal; Notable for the following:     WBC 25.0 (*)     MCH 31.4 (*)     RDW 14.6 (*)     Neutrophils # 23.3 (*)     All other components within normal limits   LACTIC ACID, PLASMA - Abnormal; Notable for the following:     Lactic Acid 2.4 (*)     All other components within normal limits   PROTIME-INR - Abnormal; Notable for the following:     Protime 26.9 (*)     All other components within normal limits   POCT GLUCOSE - Abnormal; Notable for the following:     POC Glucose 161 (*)     All other components within normal limits   POCT GLUCOSE - Normal   POCT GLUCOSE - Normal   CULTURE BLOOD #1   CULTURE BLOOD #2   TROPONIN       All other labs were within normal range or not returned as of this dictation.     EMERGENCY DEPARTMENT COURSE and DIFFERENTIAL DIAGNOSIS/MDM:   Vitals:    Vitals:    10/15/18 0625 10/15/18 0633 10/15/18 0638 10/15/18 0736   BP: (!) 119/59 (!) 119/59 (!)

## 2018-10-15 NOTE — Clinical Note
Patient Class: Inpatient [101]   REQUIRED: Diagnosis: Acute encephalopathy [009009]   Estimated Length of Stay: Estimated stay of more than 2 midnights   Future Attending Provider: Marcelino Membreno [[id number]]

## 2018-10-15 NOTE — ED NOTES
Nelson bag emptied of urine prior to transfer, 200 cc of urine out.      Radha Holm RN  10/15/18 1278

## 2018-10-16 ENCOUNTER — APPOINTMENT (OUTPATIENT)
Dept: ULTRASOUND IMAGING | Age: 83
DRG: 071 | End: 2018-10-16
Attending: INTERNAL MEDICINE
Payer: MEDICARE

## 2018-10-16 PROBLEM — R26.9 GAIT ABNORMALITY: Status: ACTIVE | Noted: 2018-10-16

## 2018-10-16 LAB
ALBUMIN SERPL-MCNC: 3.1 G/DL (ref 3.9–4.9)
ALP BLD-CCNC: 63 U/L (ref 35–104)
ALT SERPL-CCNC: 17 U/L (ref 0–41)
ANION GAP SERPL CALCULATED.3IONS-SCNC: 11 MEQ/L (ref 7–13)
AST SERPL-CCNC: 49 U/L (ref 0–40)
BASOPHILS ABSOLUTE: 0 K/UL (ref 0–0.2)
BASOPHILS RELATIVE PERCENT: 0.4 %
BILIRUB SERPL-MCNC: 0.9 MG/DL (ref 0–1.2)
BUN BLDV-MCNC: 27 MG/DL (ref 8–23)
CALCIUM SERPL-MCNC: 8.5 MG/DL (ref 8.6–10.2)
CHLORIDE BLD-SCNC: 96 MEQ/L (ref 98–107)
CO2: 31 MEQ/L (ref 22–29)
CREAT SERPL-MCNC: 0.98 MG/DL (ref 0.7–1.2)
EOSINOPHILS ABSOLUTE: 0.1 K/UL (ref 0–0.7)
EOSINOPHILS RELATIVE PERCENT: 1.4 %
GFR AFRICAN AMERICAN: >60
GFR NON-AFRICAN AMERICAN: >60
GLOBULIN: 2.8 G/DL (ref 2.3–3.5)
GLUCOSE BLD-MCNC: 102 MG/DL (ref 60–115)
GLUCOSE BLD-MCNC: 118 MG/DL (ref 60–115)
GLUCOSE BLD-MCNC: 141 MG/DL (ref 60–115)
GLUCOSE BLD-MCNC: 99 MG/DL (ref 74–109)
HCT VFR BLD CALC: 39.9 % (ref 42–52)
HEMOGLOBIN: 13.9 G/DL (ref 14–18)
INR BLD: 2.1
LYMPHOCYTES ABSOLUTE: 0.6 K/UL (ref 1–4.8)
LYMPHOCYTES RELATIVE PERCENT: 6.9 %
MCH RBC QN AUTO: 32.5 PG (ref 27–31.3)
MCHC RBC AUTO-ENTMCNC: 34.9 % (ref 33–37)
MCV RBC AUTO: 93 FL (ref 80–100)
MONOCYTES ABSOLUTE: 0.6 K/UL (ref 0.2–0.8)
MONOCYTES RELATIVE PERCENT: 6.9 %
NEUTROPHILS ABSOLUTE: 7.6 K/UL (ref 1.4–6.5)
NEUTROPHILS RELATIVE PERCENT: 84.4 %
PDW BLD-RTO: 14.4 % (ref 11.5–14.5)
PERFORMED ON: ABNORMAL
PERFORMED ON: ABNORMAL
PERFORMED ON: NORMAL
PLATELET # BLD: 136 K/UL (ref 130–400)
POTASSIUM SERPL-SCNC: 2.9 MEQ/L (ref 3.5–5.1)
PROTHROMBIN TIME: 21.9 SEC (ref 9.6–12.3)
RBC # BLD: 4.29 M/UL (ref 4.7–6.1)
SODIUM BLD-SCNC: 138 MEQ/L (ref 132–144)
TOTAL PROTEIN: 5.9 G/DL (ref 6.4–8.1)
WBC # BLD: 9 K/UL (ref 4.8–10.8)

## 2018-10-16 PROCEDURE — 6360000002 HC RX W HCPCS: Performed by: INTERNAL MEDICINE

## 2018-10-16 PROCEDURE — 76705 ECHO EXAM OF ABDOMEN: CPT

## 2018-10-16 PROCEDURE — 85610 PROTHROMBIN TIME: CPT

## 2018-10-16 PROCEDURE — 2500000003 HC RX 250 WO HCPCS: Performed by: PHYSICIAN ASSISTANT

## 2018-10-16 PROCEDURE — 36415 COLL VENOUS BLD VENIPUNCTURE: CPT

## 2018-10-16 PROCEDURE — S0028 INJECTION, FAMOTIDINE, 20 MG: HCPCS | Performed by: PHYSICIAN ASSISTANT

## 2018-10-16 PROCEDURE — 6370000000 HC RX 637 (ALT 250 FOR IP): Performed by: PHYSICIAN ASSISTANT

## 2018-10-16 PROCEDURE — 80053 COMPREHEN METABOLIC PANEL: CPT

## 2018-10-16 PROCEDURE — 2580000003 HC RX 258: Performed by: INTERNAL MEDICINE

## 2018-10-16 PROCEDURE — 99222 1ST HOSP IP/OBS MODERATE 55: CPT | Performed by: INTERNAL MEDICINE

## 2018-10-16 PROCEDURE — 6370000000 HC RX 637 (ALT 250 FOR IP): Performed by: INTERNAL MEDICINE

## 2018-10-16 PROCEDURE — 1210000000 HC MED SURG R&B

## 2018-10-16 PROCEDURE — 90686 IIV4 VACC NO PRSV 0.5 ML IM: CPT | Performed by: INTERNAL MEDICINE

## 2018-10-16 PROCEDURE — G0008 ADMIN INFLUENZA VIRUS VAC: HCPCS | Performed by: INTERNAL MEDICINE

## 2018-10-16 PROCEDURE — 85025 COMPLETE CBC W/AUTO DIFF WBC: CPT

## 2018-10-16 RX ADMIN — METOPROLOL TARTRATE 25 MG: 25 TABLET ORAL at 11:48

## 2018-10-16 RX ADMIN — PIPERACILLIN SODIUM,TAZOBACTAM SODIUM 3.38 G: 3; .375 INJECTION, POWDER, FOR SOLUTION INTRAVENOUS at 12:26

## 2018-10-16 RX ADMIN — MICONAZOLE NITRATE: 20 POWDER TOPICAL at 22:44

## 2018-10-16 RX ADMIN — PIPERACILLIN SODIUM,TAZOBACTAM SODIUM 3.38 G: 3; .375 INJECTION, POWDER, FOR SOLUTION INTRAVENOUS at 22:37

## 2018-10-16 RX ADMIN — LEVOTHYROXINE SODIUM 112 MCG: 112 TABLET ORAL at 11:48

## 2018-10-16 RX ADMIN — SIMVASTATIN 10 MG: 10 TABLET, FILM COATED ORAL at 22:43

## 2018-10-16 RX ADMIN — FAMOTIDINE 20 MG: 10 INJECTION, SOLUTION INTRAVENOUS at 11:50

## 2018-10-16 RX ADMIN — Medication: at 22:44

## 2018-10-16 RX ADMIN — MICONAZOLE NITRATE: 20 POWDER TOPICAL at 11:50

## 2018-10-16 RX ADMIN — AMLODIPINE BESYLATE 2.5 MG: 2.5 TABLET ORAL at 11:48

## 2018-10-16 RX ADMIN — VANCOMYCIN HYDROCHLORIDE 1500 MG: 5 INJECTION, POWDER, LYOPHILIZED, FOR SOLUTION INTRAVENOUS at 17:50

## 2018-10-16 RX ADMIN — INFLUENZA A VIRUS A/MICHIGAN/45/2015 X-275 (H1N1) ANTIGEN (FORMALDEHYDE INACTIVATED), INFLUENZA A VIRUS A/SINGAPORE/INFIMH-16-0019/2016 IVR-186 (H3N2) ANTIGEN (FORMALDEHYDE INACTIVATED), INFLUENZA B VIRUS B/PHUKET/3073/2013 ANTIGEN (FORMALDEHYDE INACTIVATED), AND INFLUENZA B VIRUS B/MARYLAND/15/2016 BX-69A ANTIGEN (FORMALDEHYDE INACTIVATED) 0.5 ML: 15; 15; 15; 15 INJECTION, SUSPENSION INTRAMUSCULAR at 11:49

## 2018-10-16 RX ADMIN — FAMOTIDINE 20 MG: 10 INJECTION, SOLUTION INTRAVENOUS at 22:44

## 2018-10-16 RX ADMIN — ASPIRIN 81 MG: 81 TABLET, CHEWABLE ORAL at 11:48

## 2018-10-16 RX ADMIN — WARFARIN SODIUM 7.5 MG: 2.5 TABLET ORAL at 18:53

## 2018-10-16 RX ADMIN — TAMSULOSIN HYDROCHLORIDE 0.4 MG: 0.4 CAPSULE ORAL at 11:48

## 2018-10-16 ASSESSMENT — PAIN SCALES - GENERAL: PAINLEVEL_OUTOF10: 0

## 2018-10-16 NOTE — PROGRESS NOTES
axial images of the chest were obtained from the thoracic inlet through the upper abdomen with IV contrast. Multiplanar reformats and axial maximum intensity projection images were obtained. Comparison: Chest radiograph from October 15, 2018      Findings:       Visualized portion of the thyroid gland is within normal limits. No axillary, mediastinal, or hilar lymphadenopathy. Thoracic aorta is nonaneurysmal and demonstrates atherosclerotic calcifications. Heart size is enlarged. No significant pericardial effusion. Evidence of coronary artery disease. Esophagus is within normal limits. No suspicious pulmonary nodule or mass. 2 mm calcified left lower lobe granuloma. Left lower lobe subsegmental atelectasis. 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 Flowing anterior osteophytes involving nearly the entire thoracic spine compatible with diffuse idiopathic skeletal hyperostosis. No acute or aggressive osseous abnormality of the thorax. IMPRESSION:      Left lower lobe subsegmental atelectasis. 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 Mild cardiomegaly with evidence of coronary artery disease. All CT scans at this facility use dose modulation, iterative reconstruction, and/or weight based dosing when appropriate to reduce radiation dose to as low as reasonably achievable. EXAM: CT of the abdomen and pelvis with contrast      History: Abdominal pain      Technique: Multiple contiguous axial images were obtained of the abdomen and pelvis from an level of the lung bases through the initial tuberosities with contrast. Multiplanar reformats were obtained. Comparison: CT of the abdomen and pelvis from July 22, 2014      Findings: The liver demonstrates a nodular contour compatible with cirrhosis.  The gallbladder is mildly distended and contains hyperdense material throughout likely relating to gallbladder sludge. A gallstone/2 gallstones at the gallbladder neck measure up to    approximately 7 mm. An 8 mm left renal cyst appears similar to prior examination. Additional smaller cysts are also identified within the left kidney, one of which arises from the anterior cortex and has a thin partially calcified rim. . An exophytic 1.4 cm cyst arises from    the lateral aspect of the right kidney. A 1.1 cm nonobstructing renal calculus is identified. No hydronephrosis. Nelson catheter is present within the urinary bladder. A punctate focus of air is present within the lumen of the urinary bladder likely from    Nelson catheter placement. The spleen is enlarged, measuring up to 17.7 cm in greatest AP dimension. A splenule is noted within the left upper quadrant. The stomach appears within normal limits as do the adrenal glands. Abdominal aorta is nonaneurysmal  and demonstrates moderate atherosclerotic calcification . No retroperitoneal or abdominal/pelvic lymphadenopathy. No small bowel obstruction. Colonic diverticuli are identified. No overt colonic mass or pericolonic inflammation. Appendix is within normal limits. No free fluid or free air. IMPRESSION:       Liver cirrhosis. Gallbladder is mildly distended and contains hyperdense material throughout likely relating to gallbladder sludge. A gallstone(s) measure up to approximately 7 mm, located within the gallbladder neck. Ultrasound could be of benefit to further evaluate if    clinically indicated. 1.1 cm nonobstructing left renal calculus. Splenomegaly. Colonic diverticulosis without diverticulitis. Please see lumbar spine CT for additional findings. All CT scans at this facility use dose modulation, iterative reconstruction, and/or weight based dosing when appropriate to reduce radiation dose to as low as reasonably achievable.                       CT LUMBAR SPINE WO CONTRAST   Final Result      Acute compression fracture of L5 with up to 90% height loss and up to approximately 1 cm of retropulsion of the posterior aspect of the L5 vertebral body resulting in at least moderate spinal canal stenosis. MRI would be of benefit to further evaluate    spinal canal contents. All CT scans at this facility use dose modulation, iterative reconstruction, and/or weight based dosing when appropriate to reduce radiation dose to as low as reasonably achievable. EXAM: CT of the chest with contrast      History: Atelectasis versus pneumonia. Technique: Multiple contiguous axial images of the chest were obtained from the thoracic inlet through the upper abdomen with IV contrast. Multiplanar reformats and axial maximum intensity projection images were obtained. Comparison: Chest radiograph from October 15, 2018      Findings:       Visualized portion of the thyroid gland is within normal limits. No axillary, mediastinal, or hilar lymphadenopathy. Thoracic aorta is nonaneurysmal and demonstrates atherosclerotic calcifications. Heart size is enlarged. No significant pericardial effusion. Evidence of coronary artery disease. Esophagus is within normal limits. No suspicious pulmonary nodule or mass. 2 mm calcified left lower lobe granuloma. Left lower lobe subsegmental atelectasis. 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 Flowing anterior osteophytes involving nearly the entire thoracic spine compatible with diffuse idiopathic skeletal hyperostosis. No acute or aggressive osseous abnormality of the thorax. IMPRESSION:      Left lower lobe subsegmental atelectasis. 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 Mild cardiomegaly with evidence of coronary artery disease.          All CT scans at this facility use dose modulation, iterative reconstruction, and/or weight based dosing when appropriate to reduce radiation dose to as low as reasonably limits as do the adrenal glands. Abdominal aorta is nonaneurysmal  and demonstrates moderate atherosclerotic calcification . No retroperitoneal or abdominal/pelvic lymphadenopathy. No small bowel obstruction. Colonic diverticuli are identified. No overt colonic mass or pericolonic inflammation. Appendix is within normal limits. No free fluid or free air. IMPRESSION:       Liver cirrhosis. Gallbladder is mildly distended and contains hyperdense material throughout likely relating to gallbladder sludge. A gallstone(s) measure up to approximately 7 mm, located within the gallbladder neck. Ultrasound could be of benefit to further evaluate if    clinically indicated. 1.1 cm nonobstructing left renal calculus. Splenomegaly. Colonic diverticulosis without diverticulitis. Please see lumbar spine CT for additional findings. All CT scans at this facility use dose modulation, iterative reconstruction, and/or weight based dosing when appropriate to reduce radiation dose to as low as reasonably achievable. XR CHEST PORTABLE   Final Result   Left lung base atelectasis versus infiltrate.           US ABDOMEN LIMITED    (Results Pending)     Assessment/Plan:    #Acute metabolic encephalopathy       - Possibly related to infection; continue broad spec antibiotics       - CT revealed a gallstone in the neck of the gallbladder and cirrhosis; GI was consulted and RUQ     #HTN/HLD/DMII/CHF/A Fib        - Continue home meds    #Cirrhosis        - GI was consulted; does not appear to hepatic encephalopathy based on presentation but will start rifaxamin and lactulose    Active Hospital Problems    Diagnosis Date Noted    Gait abnormality [R26.9] 10/16/2018    Sepsis ([address] [A41.9] 10/15/2018    Acute encephalopathy [G93.40] 10/15/2018    Chronic atrial fibrillation ([address] [I48.2] 03/03/2016    Cirrhosis ([facility] [address] [K74.60] 07/24/2014    Essential hypertension [I10]

## 2018-10-16 NOTE — CONSULTS
AMYLASE in the last 72 hours. Recent Labs      10/15/18   0627  10/15/18   0721  10/15/18   1333  10/16/18   0534  10/16/18   0535   PROT  7.5   --   6.9  5.9*   --    INR   --   2.6   --    --   2.1     Ct Head Wo Contrast  Result Date: 10/15/2018  1. Cortical atrophy and periventricular microangiopathy. Old infarct seen on the left frontal temporal region. No acute hemorrhage is visualized. Ct Chest W Contrast  Result Date: 10/15/2018  Acute compression fracture of L5 with up to 90% height loss and up to approximately 1 cm of retropulsion of the posterior aspect of the L5 vertebral body resulting in at least moderate spinal canal stenosis. MRI would be of benefit to further evaluate spinal canal contents. All CT scans at this facility use dose modulation, iterative reconstruction, and/or weight based dosing when appropriate to reduce radiation dose to as low as reasonably achievable. EXAM: CT of the chest with contrast History: Atelectasis versus pneumonia. Technique: Multiple contiguous axial images of the chest were obtained from the thoracic inlet through the upper abdomen with IV contrast. Multiplanar reformats and axial maximum intensity projection images were obtained. Comparison: Chest radiograph from October 15, 2018 Findings: Visualized portion of the thyroid gland is within normal limits. No axillary, mediastinal, or hilar lymphadenopathy. Thoracic aorta is nonaneurysmal and demonstrates atherosclerotic calcifications. Heart size is enlarged. No significant pericardial effusion. Evidence of coronary artery disease. Esophagus is within normal limits. No suspicious pulmonary nodule or mass. 2 mm calcified left lower lobe granuloma. Left lower lobe subsegmental atelectasis. 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 Flowing anterior osteophytes involving nearly the entire thoracic spine compatible with diffuse idiopathic skeletal hyperostosis.  No acute or aggressive osseous tracheomalacia. Flowing anterior osteophytes involving nearly the entire thoracic spine compatible with diffuse idiopathic skeletal hyperostosis. No acute or aggressive osseous abnormality of the thorax. IMPRESSION: Left lower lobe subsegmental atelectasis. 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 Mild cardiomegaly with evidence of coronary artery disease. All CT scans at this facility use dose modulation, iterative reconstruction, and/or weight based dosing when appropriate to reduce radiation dose to as low as reasonably achievable. EXAM: CT of the abdomen and pelvis with contrast History: Abdominal pain Technique: Multiple contiguous axial images were obtained of the abdomen and pelvis from an level of the lung bases through the initial tuberosities with contrast. Multiplanar reformats were obtained. Comparison: CT of the abdomen and pelvis from July 22, 2014 Findings: The liver demonstrates a nodular contour compatible with cirrhosis. The gallbladder is mildly distended and contains hyperdense material throughout likely relating to gallbladder sludge. A gallstone/2 gallstones at the gallbladder neck measure up to approximately 7 mm. An 8 mm left renal cyst appears similar to prior examination. Additional smaller cysts are also identified within the left kidney, one of which arises from the anterior cortex and has a thin partially calcified rim. . An exophytic 1.4 cm cyst arises from  the lateral aspect of the right kidney. A 1.1 cm nonobstructing renal calculus is identified. No hydronephrosis. Nelson catheter is present within the urinary bladder. A punctate focus of air is present within the lumen of the urinary bladder likely from  Nelson catheter placement. The spleen is enlarged, measuring up to 17.7 cm in greatest AP dimension. A splenule is noted within the left upper quadrant. The stomach appears within normal limits as do the adrenal glands.  Abdominal aorta is nonaneurysmal  and

## 2018-10-16 NOTE — PLAN OF CARE
Problem: Falls - Risk of:  Goal: Will remain free from falls  Will remain free from falls   Outcome: Ongoing    Goal: Absence of physical injury  Absence of physical injury   Outcome: Ongoing      Problem: Skin Integrity:  Goal: Will show no infection signs and symptoms  Will show no infection signs and symptoms   Outcome: Ongoing    Goal: Absence of new skin breakdown  Absence of new skin breakdown   Outcome: Ongoing

## 2018-10-17 LAB
ALBUMIN SERPL-MCNC: 3.1 G/DL (ref 3.9–4.9)
ALP BLD-CCNC: 66 U/L (ref 35–104)
ALT SERPL-CCNC: 21 U/L (ref 0–41)
ANION GAP SERPL CALCULATED.3IONS-SCNC: 16 MEQ/L (ref 7–13)
AST SERPL-CCNC: 48 U/L (ref 0–40)
BASOPHILS ABSOLUTE: 0.1 K/UL (ref 0–0.2)
BASOPHILS RELATIVE PERCENT: 1 %
BILIRUB SERPL-MCNC: 0.7 MG/DL (ref 0–1.2)
BUN BLDV-MCNC: 19 MG/DL (ref 8–23)
CALCIUM SERPL-MCNC: 8.8 MG/DL (ref 8.6–10.2)
CHLORIDE BLD-SCNC: 97 MEQ/L (ref 98–107)
CO2: 25 MEQ/L (ref 22–29)
CREAT SERPL-MCNC: 0.71 MG/DL (ref 0.7–1.2)
EOSINOPHILS ABSOLUTE: 0.1 K/UL (ref 0–0.7)
EOSINOPHILS RELATIVE PERCENT: 1.7 %
GFR AFRICAN AMERICAN: >60
GFR NON-AFRICAN AMERICAN: >60
GLOBULIN: 3.3 G/DL (ref 2.3–3.5)
GLUCOSE BLD-MCNC: 101 MG/DL (ref 74–109)
GLUCOSE BLD-MCNC: 106 MG/DL (ref 60–115)
GLUCOSE BLD-MCNC: 132 MG/DL (ref 60–115)
GLUCOSE BLD-MCNC: 196 MG/DL (ref 60–115)
GLUCOSE BLD-MCNC: 96 MG/DL (ref 60–115)
HCT VFR BLD CALC: 41.3 % (ref 42–52)
HEMOGLOBIN: 14.2 G/DL (ref 14–18)
LACTIC ACID: 1.6 MMOL/L (ref 0.5–2.2)
LYMPHOCYTES ABSOLUTE: 1 K/UL (ref 1–4.8)
LYMPHOCYTES RELATIVE PERCENT: 14.3 %
MAGNESIUM: 1.9 MG/DL (ref 1.7–2.3)
MCH RBC QN AUTO: 32.1 PG (ref 27–31.3)
MCHC RBC AUTO-ENTMCNC: 34.3 % (ref 33–37)
MCV RBC AUTO: 93.6 FL (ref 80–100)
MONOCYTES ABSOLUTE: 0.5 K/UL (ref 0.2–0.8)
MONOCYTES RELATIVE PERCENT: 6.7 %
NEUTROPHILS ABSOLUTE: 5.3 K/UL (ref 1.4–6.5)
NEUTROPHILS RELATIVE PERCENT: 76.3 %
PDW BLD-RTO: 14.5 % (ref 11.5–14.5)
PERFORMED ON: ABNORMAL
PERFORMED ON: ABNORMAL
PERFORMED ON: NORMAL
PERFORMED ON: NORMAL
PLATELET # BLD: 138 K/UL (ref 130–400)
POTASSIUM SERPL-SCNC: 3 MEQ/L (ref 3.5–5.1)
PROCALCITONIN: 1.45 NG/ML (ref 0–0.15)
RBC # BLD: 4.41 M/UL (ref 4.7–6.1)
SODIUM BLD-SCNC: 138 MEQ/L (ref 132–144)
TOTAL PROTEIN: 6.4 G/DL (ref 6.4–8.1)
WBC # BLD: 6.9 K/UL (ref 4.8–10.8)

## 2018-10-17 PROCEDURE — 6360000002 HC RX W HCPCS: Performed by: INTERNAL MEDICINE

## 2018-10-17 PROCEDURE — 2580000003 HC RX 258: Performed by: PHYSICIAN ASSISTANT

## 2018-10-17 PROCEDURE — 85025 COMPLETE CBC W/AUTO DIFF WBC: CPT

## 2018-10-17 PROCEDURE — S0028 INJECTION, FAMOTIDINE, 20 MG: HCPCS | Performed by: PHYSICIAN ASSISTANT

## 2018-10-17 PROCEDURE — 2580000003 HC RX 258: Performed by: INTERNAL MEDICINE

## 2018-10-17 PROCEDURE — 83735 ASSAY OF MAGNESIUM: CPT

## 2018-10-17 PROCEDURE — 6370000000 HC RX 637 (ALT 250 FOR IP): Performed by: PHYSICIAN ASSISTANT

## 2018-10-17 PROCEDURE — 84145 PROCALCITONIN (PCT): CPT

## 2018-10-17 PROCEDURE — 87506 IADNA-DNA/RNA PROBE TQ 6-11: CPT

## 2018-10-17 PROCEDURE — 80053 COMPREHEN METABOLIC PANEL: CPT

## 2018-10-17 PROCEDURE — 2500000003 HC RX 250 WO HCPCS: Performed by: PHYSICIAN ASSISTANT

## 2018-10-17 PROCEDURE — 1210000000 HC MED SURG R&B

## 2018-10-17 PROCEDURE — 6370000000 HC RX 637 (ALT 250 FOR IP): Performed by: INTERNAL MEDICINE

## 2018-10-17 PROCEDURE — 83605 ASSAY OF LACTIC ACID: CPT

## 2018-10-17 PROCEDURE — 36415 COLL VENOUS BLD VENIPUNCTURE: CPT

## 2018-10-17 PROCEDURE — 99222 1ST HOSP IP/OBS MODERATE 55: CPT | Performed by: INTERNAL MEDICINE

## 2018-10-17 RX ORDER — POTASSIUM CHLORIDE 20 MEQ/1
40 TABLET, EXTENDED RELEASE ORAL PRN
Status: DISCONTINUED | OUTPATIENT
Start: 2018-10-17 | End: 2018-10-20 | Stop reason: HOSPADM

## 2018-10-17 RX ORDER — POTASSIUM CHLORIDE 20MEQ/15ML
40 LIQUID (ML) ORAL PRN
Status: DISCONTINUED | OUTPATIENT
Start: 2018-10-17 | End: 2018-10-20 | Stop reason: HOSPADM

## 2018-10-17 RX ORDER — POTASSIUM CHLORIDE 20 MEQ/1
40 TABLET, EXTENDED RELEASE ORAL EVERY 4 HOURS
Status: COMPLETED | OUTPATIENT
Start: 2018-10-17 | End: 2018-10-17

## 2018-10-17 RX ORDER — POTASSIUM CHLORIDE 7.45 MG/ML
10 INJECTION INTRAVENOUS PRN
Status: DISCONTINUED | OUTPATIENT
Start: 2018-10-17 | End: 2018-10-20 | Stop reason: HOSPADM

## 2018-10-17 RX ADMIN — TAMSULOSIN HYDROCHLORIDE 0.4 MG: 0.4 CAPSULE ORAL at 09:05

## 2018-10-17 RX ADMIN — LEVOTHYROXINE SODIUM 112 MCG: 112 TABLET ORAL at 08:33

## 2018-10-17 RX ADMIN — WARFARIN SODIUM 7.5 MG: 2.5 TABLET ORAL at 18:09

## 2018-10-17 RX ADMIN — POTASSIUM CHLORIDE 40 MEQ: 20 TABLET, EXTENDED RELEASE ORAL at 11:22

## 2018-10-17 RX ADMIN — POTASSIUM CHLORIDE 40 MEQ: 20 TABLET, EXTENDED RELEASE ORAL at 09:05

## 2018-10-17 RX ADMIN — MICONAZOLE NITRATE: 20 POWDER TOPICAL at 09:08

## 2018-10-17 RX ADMIN — Medication 10 ML: at 09:09

## 2018-10-17 RX ADMIN — PIPERACILLIN SODIUM,TAZOBACTAM SODIUM 3.38 G: 3; .375 INJECTION, POWDER, FOR SOLUTION INTRAVENOUS at 13:10

## 2018-10-17 RX ADMIN — SODIUM CHLORIDE: 9 INJECTION, SOLUTION INTRAVENOUS at 04:50

## 2018-10-17 RX ADMIN — PIPERACILLIN SODIUM,TAZOBACTAM SODIUM 3.38 G: 3; .375 INJECTION, POWDER, FOR SOLUTION INTRAVENOUS at 04:53

## 2018-10-17 RX ADMIN — ASPIRIN 81 MG: 81 TABLET, CHEWABLE ORAL at 09:05

## 2018-10-17 RX ADMIN — METOPROLOL TARTRATE 25 MG: 25 TABLET ORAL at 09:05

## 2018-10-17 RX ADMIN — VANCOMYCIN HYDROCHLORIDE 1500 MG: 5 INJECTION, POWDER, LYOPHILIZED, FOR SOLUTION INTRAVENOUS at 09:16

## 2018-10-17 RX ADMIN — AMLODIPINE BESYLATE 2.5 MG: 2.5 TABLET ORAL at 09:05

## 2018-10-17 RX ADMIN — FAMOTIDINE 20 MG: 10 INJECTION, SOLUTION INTRAVENOUS at 10:50

## 2018-10-17 RX ADMIN — INSULIN LISPRO 1 UNITS: 100 INJECTION, SOLUTION INTRAVENOUS; SUBCUTANEOUS at 13:08

## 2018-10-17 NOTE — PROGRESS NOTES
Hospitalist Progress Note      PCP: Lloyd Cyr MD    Date of Admission: 10/15/2018    Chief Complaint:    No chief complaint on file. Subjective:  Patient is pleasently confused. Denies fevers, chills, sweats; says he slept really well last night and is AON x 2 which is an improvement. Unable to accurately complete full 12 point ROS     Medications:  Reviewed    Infusion Medications    sodium chloride 100 mL/hr at 10/17/18 0450    dextrose       Scheduled Medications    sodium chloride flush  10 mL Intravenous 2 times per day    famotidine (PEPCID) injection  20 mg Intravenous BID    aspirin  81 mg Oral Daily    levothyroxine  112 mcg Oral Daily    simvastatin  10 mg Oral Nightly    tamsulosin  0.4 mg Oral Daily    warfarin  7.5 mg Oral Daily    insulin lispro  0-6 Units Subcutaneous TID WC    insulin lispro  0-3 Units Subcutaneous Nightly    ammonium lactate   Topical Daily    miconazole   Topical BID    vancomycin  1,500 mg Intravenous Q12H    piperacillin-tazobactam  3.375 g Intravenous Q8H    metoprolol tartrate  25 mg Oral Daily    amLODIPine  2.5 mg Oral Daily     PRN Meds: sodium chloride flush, acetaminophen, glucose, dextrose, glucagon (rDNA), dextrose      Intake/Output Summary (Last 24 hours) at 10/17/18 1338  Last data filed at 10/17/18 0909   Gross per 24 hour   Intake               10 ml   Output             1000 ml   Net             -990 ml       Exam:    BP (!) 150/75   Pulse 57   Temp 98.6 °F (37 °C) (Oral)   Resp 16   Ht 6' 2\" (1.88 m)   Wt 243 lb 2.7 oz (110.3 kg)   SpO2 96%   BMI 31.22 kg/m²     General appearance: No apparent distress, appears stated age and cooperative. HEENT: Pupils equal, round, and reactive to light. Conjunctivae/corneas clear. Neck: Supple, with full range of motion. No jugular venous distention. Trachea midline. Respiratory:  Normal respiratory effort. Clear to auscultation, bilaterally without Rales/Wheezes/Rhonchi.   Cardiovascular: retroperitoneal or abdominal/pelvic lymphadenopathy. No small bowel obstruction. Colonic diverticuli are identified. No overt colonic mass or pericolonic inflammation. Appendix is within normal limits. No free fluid or free air. IMPRESSION:       Liver cirrhosis. Gallbladder is mildly distended and contains hyperdense material throughout likely relating to gallbladder sludge. A gallstone(s) measure up to approximately 7 mm, located within the gallbladder neck. Ultrasound could be of benefit to further evaluate if    clinically indicated. 1.1 cm nonobstructing left renal calculus. Splenomegaly. Colonic diverticulosis without diverticulitis. Please see lumbar spine CT for additional findings. All CT scans at this facility use dose modulation, iterative reconstruction, and/or weight based dosing when appropriate to reduce radiation dose to as low as reasonably achievable. CT CHEST W CONTRAST   Final Result      Acute compression fracture of L5 with up to 90% height loss and up to approximately 1 cm of retropulsion of the posterior aspect of the L5 vertebral body resulting in at least moderate spinal canal stenosis. MRI would be of benefit to further evaluate    spinal canal contents. All CT scans at this facility use dose modulation, iterative reconstruction, and/or weight based dosing when appropriate to reduce radiation dose to as low as reasonably achievable. EXAM: CT of the chest with contrast      History: Atelectasis versus pneumonia. Technique: Multiple contiguous axial images of the chest were obtained from the thoracic inlet through the upper abdomen with IV contrast. Multiplanar reformats and axial maximum intensity projection images were obtained. Comparison: Chest radiograph from October 15, 2018      Findings:       Visualized portion of the thyroid gland is within normal limits.  No axillary, mediastinal, or hilar lymphadenopathy. Thoracic aorta is nonaneurysmal and demonstrates atherosclerotic calcifications. Heart size is enlarged. No significant pericardial effusion. Evidence of coronary artery disease. Esophagus is within normal limits. No suspicious pulmonary nodule or mass. 2 mm calcified left lower lobe granuloma. Left lower lobe subsegmental atelectasis. 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 Flowing anterior osteophytes involving nearly the entire thoracic spine compatible with diffuse idiopathic skeletal hyperostosis. No acute or aggressive osseous abnormality of the thorax. IMPRESSION:      Left lower lobe subsegmental atelectasis. 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 Mild cardiomegaly with evidence of coronary artery disease. All CT scans at this facility use dose modulation, iterative reconstruction, and/or weight based dosing when appropriate to reduce radiation dose to as low as reasonably achievable. EXAM: CT of the abdomen and pelvis with contrast      History: Abdominal pain      Technique: Multiple contiguous axial images were obtained of the abdomen and pelvis from an level of the lung bases through the initial tuberosities with contrast. Multiplanar reformats were obtained. Comparison: CT of the abdomen and pelvis from July 22, 2014      Findings: The liver demonstrates a nodular contour compatible with cirrhosis. The gallbladder is mildly distended and contains hyperdense material throughout likely relating to gallbladder sludge. A gallstone/2 gallstones at the gallbladder neck measure up to    approximately 7 mm. An 8 mm left renal cyst appears similar to prior examination. Additional smaller cysts are also identified within the left kidney, one of which arises from the anterior cortex and has a thin partially calcified rim. . An exophytic 1.4 cm cyst arises from    the lateral but no intervention warranted at this time    #HTN/HLD/DMII/Chronic diastolic HF/A Fib        - Continue home meds    #Cirrhosis        - GI was consulted; does not appear to hepatic encephalopathy based on presentation but will start rifaxamin and lactulose; patient is slightly better today    #Hypokalemia        - PRN replacement    Active Hospital Problems    Diagnosis Date Noted    Gait abnormality [R26.9] 10/16/2018    Sepsis ([facility] [A41.9] 10/15/2018    Acute encephalopathy [G93.40] 10/15/2018    Chronic atrial fibrillation ([facility] [address] [I48.2] 03/03/2016    Cirrhosis ([facility] [K74.60] 07/24/2014    Essential hypertension [I10] 12/06/2013    Type 2 diabetes mellitus without complication ([facility] [A62.2] 12/06/2013    Hyperlipidemia [E78.5] 12/06/2013     Additional work up or/and treatment plan may be added today or then after based on clinical progression. I am managing a portion of pt care. Some medical issues are handled by other specialists. Additional work up and treatment should be done in out pt setting by pt PCP and other out pt providers. In addition to examining and evaluating pt, I spent additional time explaining care, normal and abnormal findings, and treatment plan. All of pt questions were answered. Counseling, diet and education were  provided. Case will be discussed with nursing staff when appropriate. Family will be updated if and when appropriate.       Diet: DIET CARDIAC; Carb Control: 4 carb choices (60 gms)/meal; Low Sodium (2 GM)    Code Status: Full Code    PT/OT Eval     Electronically signed by Zay Palacios MD on 10/17/2018 at 1:38 PM

## 2018-10-17 NOTE — CONSULTS
Consults   Infectious Disease     Patient Name: Flip Lockhart  Date: 10/17/2018  YOB: 1935  Medical Record Number: [id number]    Mental status change leukocytosis        History of Present Illness:      Colon cancer hypertension diabetes right colectomy  Referred from [facility] with a change in mental status and hallucinations     loss of consciousness prior to coming to the hospital    Blood cell count elevated 25,000  No fevers        ROS   Positive for chills, Negative for fatigue and fever. HENT: Negative for dental problem, ear pain and sore throat. Eyes: Negative for pain and redness. Respiratory: Negative for cough, chest tightness and shortness of breath. Cardiovascular: Negative for chest pain, palpitations and leg swelling. Gastrointestinal: Negative for abdominal distention, abdominal pain, diarrhea, nausea and vomiting. Endocrine: Negative for cold intolerance, heat intolerance, polydipsia, polyphagia and polyuria. Genitourinary: Negative for difficulty urinating, dysuria, hematuria and urgency. Musculoskeletal: Negative for arthralgias, back pain and neck pain. Skin: Negative for color change, pallor, rash and wound. Allergic/Immunologic: Negative for environmental allergiesand food allergies. Neurological: Negative for dizziness, speech difficulty, weakness, numbness and headaches. Hematological: Negative for adenopathy. Does not bruise/bleed easily. Psychiatric/Behavioral: hearin voices.    Review of Systems: All 14 review of systems negative other than as stated above    Social History   Substance Use Topics    Smoking status: Never Smoker    Smokeless tobacco: Never Used    Alcohol use No         Past Medical History:   Diagnosis Date    Atrial fibrillation ([facility]     Carcinoma of ascending colon ([facility] 2014    CHF (congestive heart failure) (HCC)     Chronic atrial fibrillation ([facility] 3/3/2016    Dementia     Foot ulcer, left ([facility] [address] [facility] 3/3/2016    Hyperlipidemia     Hypertension     Hypothyroidism     Type II or unspecified type diabetes mellitus without mention of complication, not stated as uncontrolled     diet           Past Surgical History:   Procedure Laterality Date    COLONOSCOPY  5/9/14    bx     OTHER SURGICAL HISTORY  7/29/14    Right colectomy with primary anastomosis and liver bx    RIGHT COLECTOMY  07/29/14    ANDSAVANNAKO    UPPER GASTROINTESTINAL ENDOSCOPY  5/9/14    bx ike         No current facility-administered medications on file prior to encounter. Current Outpatient Prescriptions on File Prior to Encounter   Medication Sig Dispense Refill    levothyroxine (SYNTHROID) 112 MCG tablet TAKE 1 TABLET BY MOUTH DAILY. 90 tablet 1    omeprazole (PRILOSEC) 20 MG delayed release capsule Take 1 capsule by mouth daily 90 capsule 3    metoprolol tartrate (LOPRESSOR) 50 MG tablet take ONE-HALF tablet once a day 45 tablet 5    warfarin (COUMADIN) 7.5 MG tablet Take as directed (Patient taking differently: TAKE ONE TABLET BY MOUTH DAILY) 90 tablet 1    furosemide (LASIX) 40 MG tablet Take 1 tablet by mouth daily 90 tablet 3    lovastatin (MEVACOR) 20 MG tablet Take 1 tablet by mouth nightly 90 tablet 3    potassium chloride (KLOR-CON M) 20 MEQ extended release tablet Take 3 tablets by mouth daily (Patient taking differently: Take 50 mEq by mouth 2 times daily ) 225 tablet 3    Misc. Devices MISC One lift chair, to be used daily Diagnosis CHF, obesity 1 each 0    metolazone (ZAROXOLYN) 5 MG tablet TAKE 1 TABLET BY MOUTH DAILY.  90 tablet 2    nisoldipine (SULAR) 8.5 MG extended release tablet TAKE 1 TABLET BY MOUTH EVERY DAY 90 tablet 2    fenofibrate 160 MG tablet Take 1 tablet by mouth daily Note to pharmacy: Please dispense generic fenofibrate unless prescriber denotes ELDA 90 tablet 2    Lift Chair MISC by Does not apply route 1 each [address] Acevedo by Does not apply route 1 each 0    Lift Chair MISC by Does not

## 2018-10-18 ENCOUNTER — APPOINTMENT (OUTPATIENT)
Dept: MRI IMAGING | Age: 83
DRG: 071 | End: 2018-10-18
Attending: INTERNAL MEDICINE
Payer: MEDICARE

## 2018-10-18 LAB
ALBUMIN SERPL-MCNC: 3.2 G/DL (ref 3.9–4.9)
ALP BLD-CCNC: 70 U/L (ref 35–104)
ALT SERPL-CCNC: 20 U/L (ref 0–41)
ANION GAP SERPL CALCULATED.3IONS-SCNC: 12 MEQ/L (ref 7–13)
AST SERPL-CCNC: 43 U/L (ref 0–40)
BASOPHILS ABSOLUTE: 0 K/UL (ref 0–0.2)
BASOPHILS RELATIVE PERCENT: 0.5 %
BILIRUB SERPL-MCNC: 0.5 MG/DL (ref 0–1.2)
BUN BLDV-MCNC: 18 MG/DL (ref 8–23)
C DIFFICILE TOXIN, EIA: NORMAL
CALCIUM SERPL-MCNC: 8.7 MG/DL (ref 8.6–10.2)
CHLORIDE BLD-SCNC: 98 MEQ/L (ref 98–107)
CO2: 26 MEQ/L (ref 22–29)
CREAT SERPL-MCNC: 0.69 MG/DL (ref 0.7–1.2)
EOSINOPHILS ABSOLUTE: 0.1 K/UL (ref 0–0.7)
EOSINOPHILS RELATIVE PERCENT: 2.1 %
GFR AFRICAN AMERICAN: >60
GFR NON-AFRICAN AMERICAN: >60
GI BACTERIAL PATHOGENS BY PCR: NORMAL
GLOBULIN: 2.9 G/DL (ref 2.3–3.5)
GLUCOSE BLD-MCNC: 111 MG/DL (ref 60–115)
GLUCOSE BLD-MCNC: 122 MG/DL (ref 60–115)
GLUCOSE BLD-MCNC: 127 MG/DL (ref 60–115)
GLUCOSE BLD-MCNC: 127 MG/DL (ref 74–109)
GLUCOSE BLD-MCNC: 98 MG/DL (ref 60–115)
HCT VFR BLD CALC: 40.3 % (ref 42–52)
HEMOGLOBIN: 13.7 G/DL (ref 14–18)
INR BLD: 2.3
LYMPHOCYTES ABSOLUTE: 0.7 K/UL (ref 1–4.8)
LYMPHOCYTES RELATIVE PERCENT: 11.1 %
MCH RBC QN AUTO: 31.8 PG (ref 27–31.3)
MCHC RBC AUTO-ENTMCNC: 33.9 % (ref 33–37)
MCV RBC AUTO: 93.8 FL (ref 80–100)
MONOCYTES ABSOLUTE: 0.5 K/UL (ref 0.2–0.8)
MONOCYTES RELATIVE PERCENT: 7.8 %
NEUTROPHILS ABSOLUTE: 5.2 K/UL (ref 1.4–6.5)
NEUTROPHILS RELATIVE PERCENT: 78.5 %
PDW BLD-RTO: 14.4 % (ref 11.5–14.5)
PERFORMED ON: ABNORMAL
PERFORMED ON: ABNORMAL
PERFORMED ON: NORMAL
PERFORMED ON: NORMAL
PLATELET # BLD: 146 K/UL (ref 130–400)
POTASSIUM SERPL-SCNC: 2.9 MEQ/L (ref 3.5–5.1)
PROTHROMBIN TIME: 24.3 SEC (ref 9.6–12.3)
RBC # BLD: 4.3 M/UL (ref 4.7–6.1)
SODIUM BLD-SCNC: 136 MEQ/L (ref 132–144)
TOTAL PROTEIN: 6.1 G/DL (ref 6.4–8.1)
VANCOMYCIN TROUGH: 15.6 UG/ML (ref 10–20)
WBC # BLD: 6.6 K/UL (ref 4.8–10.8)

## 2018-10-18 PROCEDURE — 2580000003 HC RX 258: Performed by: PHYSICIAN ASSISTANT

## 2018-10-18 PROCEDURE — 2580000003 HC RX 258: Performed by: INTERNAL MEDICINE

## 2018-10-18 PROCEDURE — 87324 CLOSTRIDIUM AG IA: CPT

## 2018-10-18 PROCEDURE — 36415 COLL VENOUS BLD VENIPUNCTURE: CPT

## 2018-10-18 PROCEDURE — 72148 MRI LUMBAR SPINE W/O DYE: CPT

## 2018-10-18 PROCEDURE — 1210000000 HC MED SURG R&B

## 2018-10-18 PROCEDURE — 2500000003 HC RX 250 WO HCPCS: Performed by: PHYSICIAN ASSISTANT

## 2018-10-18 PROCEDURE — 6370000000 HC RX 637 (ALT 250 FOR IP): Performed by: INTERNAL MEDICINE

## 2018-10-18 PROCEDURE — 80053 COMPREHEN METABOLIC PANEL: CPT

## 2018-10-18 PROCEDURE — 6370000000 HC RX 637 (ALT 250 FOR IP): Performed by: PHYSICIAN ASSISTANT

## 2018-10-18 PROCEDURE — S0028 INJECTION, FAMOTIDINE, 20 MG: HCPCS | Performed by: PHYSICIAN ASSISTANT

## 2018-10-18 PROCEDURE — 85025 COMPLETE CBC W/AUTO DIFF WBC: CPT

## 2018-10-18 PROCEDURE — 80202 ASSAY OF VANCOMYCIN: CPT

## 2018-10-18 PROCEDURE — 6360000002 HC RX W HCPCS: Performed by: INTERNAL MEDICINE

## 2018-10-18 PROCEDURE — 99232 SBSQ HOSP IP/OBS MODERATE 35: CPT | Performed by: INTERNAL MEDICINE

## 2018-10-18 PROCEDURE — 85610 PROTHROMBIN TIME: CPT

## 2018-10-18 PROCEDURE — 87449 NOS EACH ORGANISM AG IA: CPT

## 2018-10-18 RX ORDER — LORAZEPAM 1 MG/1
1 TABLET ORAL ONCE
Status: COMPLETED | OUTPATIENT
Start: 2018-10-18 | End: 2018-10-18

## 2018-10-18 RX ORDER — POTASSIUM CHLORIDE 20 MEQ/1
40 TABLET, EXTENDED RELEASE ORAL ONCE
Status: COMPLETED | OUTPATIENT
Start: 2018-10-18 | End: 2018-10-19

## 2018-10-18 RX ORDER — LORAZEPAM 2 MG/ML
1 INJECTION INTRAMUSCULAR
Status: DISPENSED | OUTPATIENT
Start: 2018-10-18 | End: 2018-10-18

## 2018-10-18 RX ORDER — POTASSIUM CHLORIDE 20 MEQ/1
60 TABLET, EXTENDED RELEASE ORAL ONCE
Status: COMPLETED | OUTPATIENT
Start: 2018-10-18 | End: 2018-10-18

## 2018-10-18 RX ADMIN — Medication: at 21:01

## 2018-10-18 RX ADMIN — PIPERACILLIN SODIUM,TAZOBACTAM SODIUM 3.38 G: 3; .375 INJECTION, POWDER, FOR SOLUTION INTRAVENOUS at 03:06

## 2018-10-18 RX ADMIN — SIMVASTATIN 10 MG: 10 TABLET, FILM COATED ORAL at 21:09

## 2018-10-18 RX ADMIN — VANCOMYCIN HYDROCHLORIDE 1500 MG: 5 INJECTION, POWDER, LYOPHILIZED, FOR SOLUTION INTRAVENOUS at 17:30

## 2018-10-18 RX ADMIN — PIPERACILLIN SODIUM,TAZOBACTAM SODIUM 3.38 G: 3; .375 INJECTION, POWDER, FOR SOLUTION INTRAVENOUS at 20:59

## 2018-10-18 RX ADMIN — SODIUM CHLORIDE, PRESERVATIVE FREE 10 ML: 5 INJECTION INTRAVENOUS at 01:05

## 2018-10-18 RX ADMIN — WARFARIN SODIUM 7.5 MG: 2.5 TABLET ORAL at 17:11

## 2018-10-18 RX ADMIN — Medication: at 00:21

## 2018-10-18 RX ADMIN — MICONAZOLE NITRATE: 20 POWDER TOPICAL at 21:01

## 2018-10-18 RX ADMIN — POTASSIUM CHLORIDE 60 MEQ: 20 TABLET, EXTENDED RELEASE ORAL at 21:09

## 2018-10-18 RX ADMIN — VANCOMYCIN HYDROCHLORIDE 1500 MG: 5 INJECTION, POWDER, LYOPHILIZED, FOR SOLUTION INTRAVENOUS at 01:03

## 2018-10-18 RX ADMIN — SIMVASTATIN 10 MG: 10 TABLET, FILM COATED ORAL at 00:20

## 2018-10-18 RX ADMIN — ASPIRIN 81 MG: 81 TABLET, CHEWABLE ORAL at 12:09

## 2018-10-18 RX ADMIN — LORAZEPAM 1 MG: 1 TABLET ORAL at 13:27

## 2018-10-18 RX ADMIN — TAMSULOSIN HYDROCHLORIDE 0.4 MG: 0.4 CAPSULE ORAL at 12:09

## 2018-10-18 RX ADMIN — LEVOTHYROXINE SODIUM 112 MCG: 112 TABLET ORAL at 05:10

## 2018-10-18 RX ADMIN — MICONAZOLE NITRATE: 20 POWDER TOPICAL at 12:11

## 2018-10-18 RX ADMIN — METOPROLOL TARTRATE 25 MG: 25 TABLET ORAL at 12:09

## 2018-10-18 RX ADMIN — AMLODIPINE BESYLATE 2.5 MG: 2.5 TABLET ORAL at 12:09

## 2018-10-18 RX ADMIN — FAMOTIDINE 20 MG: 10 INJECTION, SOLUTION INTRAVENOUS at 12:09

## 2018-10-18 RX ADMIN — FAMOTIDINE 20 MG: 10 INJECTION, SOLUTION INTRAVENOUS at 02:07

## 2018-10-18 RX ADMIN — SODIUM CHLORIDE: 9 INJECTION, SOLUTION INTRAVENOUS at 12:10

## 2018-10-18 RX ADMIN — Medication 10 ML: at 03:07

## 2018-10-18 RX ADMIN — MICONAZOLE NITRATE: 20 POWDER TOPICAL at 00:21

## 2018-10-18 RX ADMIN — FAMOTIDINE 20 MG: 10 INJECTION, SOLUTION INTRAVENOUS at 21:01

## 2018-10-18 RX ADMIN — SODIUM CHLORIDE: 9 INJECTION, SOLUTION INTRAVENOUS at 17:35

## 2018-10-18 ASSESSMENT — PAIN SCALES - GENERAL
PAINLEVEL_OUTOF10: 0

## 2018-10-18 NOTE — PROGRESS NOTES
Patient is alert and oriented to self and place. Disoriented to time and situation. Follows commands and is able to carry conversation. Denies pain. Ambulates with one assist with walker. Weakness to BLE. Powder applied to abdominal pannus. Lac hydrin lotion applied to BLE. Patient laughs inappropriately when spoken to.     1515 - patient returned from MRI.

## 2018-10-18 NOTE — PROGRESS NOTES
diverticulitis. Please see lumbar spine CT for additional findings. All CT scans at this facility use dose modulation, iterative reconstruction, and/or weight based dosing when appropriate to reduce radiation dose to as low as reasonably achievable. CT LUMBAR SPINE WO CONTRAST   Final Result      Acute compression fracture of L5 with up to 90% height loss and up to approximately 1 cm of retropulsion of the posterior aspect of the L5 vertebral body resulting in at least moderate spinal canal stenosis. MRI would be of benefit to further evaluate    spinal canal contents. All CT scans at this facility use dose modulation, iterative reconstruction, and/or weight based dosing when appropriate to reduce radiation dose to as low as reasonably achievable. EXAM: CT of the chest with contrast      History: Atelectasis versus pneumonia. Technique: Multiple contiguous axial images of the chest were obtained from the thoracic inlet through the upper abdomen with IV contrast. Multiplanar reformats and axial maximum intensity projection images were obtained. Comparison: Chest radiograph from October 15, 2018      Findings:       Visualized portion of the thyroid gland is within normal limits. No axillary, mediastinal, or hilar lymphadenopathy. Thoracic aorta is nonaneurysmal and demonstrates atherosclerotic calcifications. Heart size is enlarged. No significant pericardial effusion. Evidence of coronary artery disease. Esophagus is within normal limits. No suspicious pulmonary nodule or mass. 2 mm calcified left lower lobe granuloma. Left lower lobe subsegmental atelectasis. 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 Flowing anterior osteophytes involving nearly the entire thoracic spine compatible with diffuse idiopathic skeletal hyperostosis. No acute or aggressive osseous abnormality of the thorax.       IMPRESSION:      Left lower lobe calcifications. Heart size is enlarged. No significant pericardial effusion. Evidence of coronary artery disease. Esophagus is within normal limits. No suspicious pulmonary nodule or mass. 2 mm calcified left lower lobe granuloma. Left lower lobe subsegmental atelectasis. 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 Flowing anterior osteophytes involving nearly the entire thoracic spine compatible with diffuse idiopathic skeletal hyperostosis. No acute or aggressive osseous abnormality of the thorax. IMPRESSION:      Left lower lobe subsegmental atelectasis. 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 Mild cardiomegaly with evidence of coronary artery disease. All CT scans at this facility use dose modulation, iterative reconstruction, and/or weight based dosing when appropriate to reduce radiation dose to as low as reasonably achievable. EXAM: CT of the abdomen and pelvis with contrast      History: Abdominal pain      Technique: Multiple contiguous axial images were obtained of the abdomen and pelvis from an level of the lung bases through the initial tuberosities with contrast. Multiplanar reformats were obtained. Comparison: CT of the abdomen and pelvis from July 22, 2014      Findings: The liver demonstrates a nodular contour compatible with cirrhosis. The gallbladder is mildly distended and contains hyperdense material throughout likely relating to gallbladder sludge. A gallstone/2 gallstones at the gallbladder neck measure up to    approximately 7 mm. An 8 mm left renal cyst appears similar to prior examination. Additional smaller cysts are also identified within the left kidney, one of which arises from the anterior cortex and has a thin partially calcified rim. . An exophytic 1.4 cm cyst arises from    the lateral aspect of the right kidney. A 1.1 cm nonobstructing renal calculus is identified.  No hydronephrosis. Nelson catheter is present within the urinary bladder. A punctate focus of air is present within the lumen of the urinary bladder likely from    Nelson catheter placement. The spleen is enlarged, measuring up to 17.7 cm in greatest AP dimension. A splenule is noted within the left upper quadrant. The stomach appears within normal limits as do the adrenal glands. Abdominal aorta is nonaneurysmal  and demonstrates moderate atherosclerotic calcification . No retroperitoneal or abdominal/pelvic lymphadenopathy. No small bowel obstruction. Colonic diverticuli are identified. No overt colonic mass or pericolonic inflammation. Appendix is within normal limits. No free fluid or free air. IMPRESSION:       Liver cirrhosis. Gallbladder is mildly distended and contains hyperdense material throughout likely relating to gallbladder sludge. A gallstone(s) measure up to approximately 7 mm, located within the gallbladder neck. Ultrasound could be of benefit to further evaluate if    clinically indicated. 1.1 cm nonobstructing left renal calculus. Splenomegaly. Colonic diverticulosis without diverticulitis. Please see lumbar spine CT for additional findings. All CT scans at this facility use dose modulation, iterative reconstruction, and/or weight based dosing when appropriate to reduce radiation dose to as low as reasonably achievable. XR CHEST PORTABLE   Final Result   Left lung base atelectasis versus infiltrate.           MRA HEAD W WO CONTARST    (Results Pending)   MRI BRAIN W CONTRAST    (Results Pending)   MRI LUMBAR SPINE WO CONTRAST    (Results Pending)     Assessment/Plan:    #Acute metabolic encephalopathy       - Neuro consulted    #Leucocytosis       - No source of infection but procalcitonin was elevated; ID is managing now    #Acute compression fracture      - MRI ordered; neurosurgery consulted    #HTN/HLD/DMII/Chronic

## 2018-10-18 NOTE — PROGRESS NOTES
Mental status change leukocytosis       Colon cancer hypertension diabetes right colectomy  Referred from [facility] with a change in mental status and hallucinations    Respiratory: Negative for cough, chest tightness and shortness of breath. Cardiovascular: Negative for chest pain, palpitations and leg swelling. Gastrointestinal: Negative for abdominal distention, abdominal pain, diarrhea, nausea and vomiting. Endocrine: Negative for cold intolerance, heat intolerance, polydipsia, polyphagia and polyuria. Genitourinary: Negative for difficulty urinating, dysuria, hematuria and urgency. Musculoskeletal: Negative for arthralgias, back pain and neck pain. Skin: Negative for color change, pallor, rash and wound. Allergic/Immunologic: Negative for environmental allergiesand food allergies. Neurological: Negative for dizziness, speech difficulty, weakness, numbness and headaches. Hematological: Negative for adenopathy. Does not bruise/bleed easily.       BP (!) 148/69   Pulse 52   Temp 98.1 °F (36.7 °C) (Oral)   Resp 18   Ht 6' 2\" (1.88 m)   Wt 256 lb 2.8 oz (116.2 kg)   SpO2 100%   BMI 32.89 kg/m²      Eyes: Pupils are equal, round, and reactive to light. Neck: Normal range of motion. No JVD present. No tracheal deviation present. No thyromegaly present. Cardiovascular: Normal rate, normal heart sounds and intact distal pulses. Exam reveals no gallop and no friction rub. No murmur heard. Pulmonary/Chest: Effort normal and breath sounds normal. No respiratory distress. He has no wheezes. He has no rales. He exhibits no tenderness. Abdominal: Soft. Bowel sounds are normal. He exhibits no distension and no mass. There is no tenderness. There is no rebound and no guarding. Musculoskeletal: He exhibits no edema or tenderness. Neurological: He is alert. He displays normal reflexes. No cranial nerve deficit. Coordination normal.   Skin: No rash noted. No erythema. No pallor.         10/15/2018  7:31 AM - [place], Chpo Incoming Lab Results From Soft      Component Results      Component Value Ref Range & Units Status Collected Lab   Color, UA REBECCA   Straw/Yellow Final 10/15/2018  7:21 AM [address], [place] Clear  Clear Final 10/15/2018  7:21 AM [facility]   Glucose, Ur Negative  Negative mg/dL Final 10/15/2018  7:21 AM [facility]   Bilirubin Urine SMALL   Negative Final 10/15/2018  7:21 AM [facility]   Ketones, Urine TRACE   Negative mg/dL Final 10/15/2018  7:21  [address] [facility]   Specific Gravity, UA 1.025  1.005 - 1.030 Final 10/15/2018  7:21 AM [facility]   Blood, Urine Negative  Negative Final 10/15/2018  7:21 AM [facility]   pH, UA 5.0  5.0 - 9.0 Final 10/15/2018  7:21 AM [facility]   Protein, UA TRACE   Negative mg/dL Final 10/15/2018  7:21 AM [facility]   Urobilinogen, Urine 1.0  <2.0 E.U./dL Final 10/15/2018  7:21 AM [facility]   Nitrite, Urine Negative  Negative Final 10/15/2018  7:21 AM [facility]   Leukocyte Esterase, Urine Negative  Negative Final 10/15/2018  7:21 AM [facility]   Urine Reflex to Culture Not Indicated                  Gastrointestinal Panel by DNA [[id number]] Collected: 10/17/18 1641   Order Status: Completed Specimen: Stool Updated: 10/18/18 1332    GI Bacterial Pathogens By PCR --    DNA of organisms below NOT DETECTED   The following organisms have been tested using nucleic acid   testing technology. Campylobacter species   Salmonella species   Shigella species   Vibrio species   Yersinia enterocolitica   Shigatoxin 1 and 2 (STEC)   Norovirus   Rotavirus   Normal Range: Not Detected    Narrative:     ORDER#: [id number]                          ORDERED BY: Lasandra Severance  SOURCE: Stool                              COLLECTED:  10/17/18 16:41  ANTIBIOTICS AT PAT. :                      RECEIVED :  10/18/18

## 2018-10-19 ENCOUNTER — APPOINTMENT (OUTPATIENT)
Dept: GENERAL RADIOLOGY | Age: 83
DRG: 071 | End: 2018-10-19
Attending: INTERNAL MEDICINE
Payer: MEDICARE

## 2018-10-19 LAB
ALBUMIN SERPL-MCNC: 3.1 G/DL (ref 3.9–4.9)
ALP BLD-CCNC: 67 U/L (ref 35–104)
ALT SERPL-CCNC: 18 U/L (ref 0–41)
ANION GAP SERPL CALCULATED.3IONS-SCNC: 10 MEQ/L (ref 7–13)
AST SERPL-CCNC: 36 U/L (ref 0–40)
BASOPHILS ABSOLUTE: 0 K/UL (ref 0–0.2)
BASOPHILS RELATIVE PERCENT: 0.5 %
BILIRUB SERPL-MCNC: 0.6 MG/DL (ref 0–1.2)
BUN BLDV-MCNC: 16 MG/DL (ref 8–23)
CALCIUM SERPL-MCNC: 8.3 MG/DL (ref 8.6–10.2)
CHLORIDE BLD-SCNC: 103 MEQ/L (ref 98–107)
CO2: 26 MEQ/L (ref 22–29)
CREAT SERPL-MCNC: 0.9 MG/DL (ref 0.7–1.2)
EOSINOPHILS ABSOLUTE: 0.2 K/UL (ref 0–0.7)
EOSINOPHILS RELATIVE PERCENT: 2.9 %
GFR AFRICAN AMERICAN: >60
GFR NON-AFRICAN AMERICAN: >60
GLOBULIN: 2.7 G/DL (ref 2.3–3.5)
GLUCOSE BLD-MCNC: 103 MG/DL (ref 60–115)
GLUCOSE BLD-MCNC: 104 MG/DL (ref 60–115)
GLUCOSE BLD-MCNC: 137 MG/DL (ref 60–115)
GLUCOSE BLD-MCNC: 88 MG/DL (ref 74–109)
GLUCOSE BLD-MCNC: 91 MG/DL (ref 60–115)
HCT VFR BLD CALC: 42 % (ref 42–52)
HEMOGLOBIN: 14.1 G/DL (ref 14–18)
LYMPHOCYTES ABSOLUTE: 0.9 K/UL (ref 1–4.8)
LYMPHOCYTES RELATIVE PERCENT: 12.5 %
MCH RBC QN AUTO: 31.7 PG (ref 27–31.3)
MCHC RBC AUTO-ENTMCNC: 33.6 % (ref 33–37)
MCV RBC AUTO: 94.4 FL (ref 80–100)
MONOCYTES ABSOLUTE: 0.4 K/UL (ref 0.2–0.8)
MONOCYTES RELATIVE PERCENT: 6.3 %
NEUTROPHILS ABSOLUTE: 5.3 K/UL (ref 1.4–6.5)
NEUTROPHILS RELATIVE PERCENT: 77.8 %
PDW BLD-RTO: 14.4 % (ref 11.5–14.5)
PERFORMED ON: ABNORMAL
PERFORMED ON: NORMAL
PLATELET # BLD: 154 K/UL (ref 130–400)
POTASSIUM SERPL-SCNC: 4 MEQ/L (ref 3.5–5.1)
RBC # BLD: 4.45 M/UL (ref 4.7–6.1)
SODIUM BLD-SCNC: 139 MEQ/L (ref 132–144)
TOTAL PROTEIN: 5.8 G/DL (ref 6.4–8.1)
WBC # BLD: 6.9 K/UL (ref 4.8–10.8)

## 2018-10-19 PROCEDURE — 85025 COMPLETE CBC W/AUTO DIFF WBC: CPT

## 2018-10-19 PROCEDURE — 36415 COLL VENOUS BLD VENIPUNCTURE: CPT

## 2018-10-19 PROCEDURE — 6370000000 HC RX 637 (ALT 250 FOR IP): Performed by: PHYSICIAN ASSISTANT

## 2018-10-19 PROCEDURE — 2580000003 HC RX 258: Performed by: PHYSICIAN ASSISTANT

## 2018-10-19 PROCEDURE — 6370000000 HC RX 637 (ALT 250 FOR IP): Performed by: INTERNAL MEDICINE

## 2018-10-19 PROCEDURE — 99232 SBSQ HOSP IP/OBS MODERATE 35: CPT | Performed by: INTERNAL MEDICINE

## 2018-10-19 PROCEDURE — 1210000000 HC MED SURG R&B

## 2018-10-19 PROCEDURE — 73630 X-RAY EXAM OF FOOT: CPT

## 2018-10-19 PROCEDURE — 80053 COMPREHEN METABOLIC PANEL: CPT

## 2018-10-19 RX ADMIN — WARFARIN SODIUM 7.5 MG: 2.5 TABLET ORAL at 18:59

## 2018-10-19 RX ADMIN — METOPROLOL TARTRATE 25 MG: 25 TABLET ORAL at 11:50

## 2018-10-19 RX ADMIN — MICONAZOLE NITRATE: 20 POWDER TOPICAL at 11:50

## 2018-10-19 RX ADMIN — SIMVASTATIN 10 MG: 10 TABLET, FILM COATED ORAL at 23:30

## 2018-10-19 RX ADMIN — Medication: at 23:31

## 2018-10-19 RX ADMIN — POTASSIUM CHLORIDE 40 MEQ: 20 TABLET, EXTENDED RELEASE ORAL at 00:02

## 2018-10-19 RX ADMIN — TAMSULOSIN HYDROCHLORIDE 0.4 MG: 0.4 CAPSULE ORAL at 11:49

## 2018-10-19 RX ADMIN — ASPIRIN 81 MG: 81 TABLET, CHEWABLE ORAL at 11:49

## 2018-10-19 RX ADMIN — AMLODIPINE BESYLATE 2.5 MG: 2.5 TABLET ORAL at 11:49

## 2018-10-19 RX ADMIN — MICONAZOLE NITRATE: 20 POWDER TOPICAL at 23:31

## 2018-10-19 RX ADMIN — LEVOTHYROXINE SODIUM 112 MCG: 112 TABLET ORAL at 06:12

## 2018-10-19 ASSESSMENT — PAIN SCALES - GENERAL
PAINLEVEL_OUTOF10: 0

## 2018-10-19 NOTE — CONSULTS
([facility]     Carcinoma of ascending colon ([facility] 2014    CHF (congestive heart failure) (HCC)     Chronic atrial fibrillation ([facility] 3/3/2016    Dementia     Foot ulcer, left ([facility] 3/3/2016    Hyperlipidemia     Hypertension     Hypothyroidism     Type II or unspecified type diabetes mellitus without mention of complication, not stated as uncontrolled     diet       Past Surgical History:   Procedure Laterality Date    COLONOSCOPY  5/9/14    bx     OTHER SURGICAL HISTORY  7/29/14    Right colectomy with primary anastomosis and liver bx    RIGHT COLECTOMY  07/29/14    ANDRASKO    UPPER GASTROINTESTINAL ENDOSCOPY  5/9/14    bx jarmoszuk       No current facility-administered medications on file prior to encounter. Current Outpatient Prescriptions on File Prior to Encounter   Medication Sig Dispense Refill    levothyroxine (SYNTHROID) 112 MCG tablet TAKE 1 TABLET BY MOUTH DAILY. 90 tablet 1    omeprazole (PRILOSEC) 20 MG delayed release capsule Take 1 capsule by mouth daily 90 capsule 3    metoprolol tartrate (LOPRESSOR) 50 MG tablet take ONE-HALF tablet once a day 45 tablet 5    warfarin (COUMADIN) 7.5 MG tablet Take as directed (Patient taking differently: TAKE ONE TABLET BY MOUTH DAILY) 90 tablet 1    furosemide (LASIX) 40 MG tablet Take 1 tablet by mouth daily 90 tablet 3    lovastatin (MEVACOR) 20 MG tablet Take 1 tablet by mouth nightly 90 tablet 3    potassium chloride (KLOR-CON M) 20 MEQ extended release tablet Take 3 tablets by mouth daily (Patient taking differently: Take 50 mEq by mouth 2 times daily ) 225 tablet 3    Misc. Devices MISC One lift chair, to be used daily Diagnosis CHF, obesity 1 each 0    metolazone (ZAROXOLYN) 5 MG tablet TAKE 1 TABLET BY MOUTH DAILY.  90 tablet 2    nisoldipine (SULAR) 8.5 MG extended release tablet TAKE 1 TABLET BY MOUTH EVERY DAY 90 tablet 2    fenofibrate 160 MG tablet Take 1 tablet by mouth daily Note to pharmacy: Please dispense generic fenofibrate unless prescriber denotes ELDA 90 tablet 2    Lift Chair MISC by Does not apply route 1 each [address] by Does not apply route 1 each 0    Lift Chair MISC by Does not apply route 1 each 0    Incontinence Supply Disposable (ATTENDS BARIATRIC BRIEF XXL) MISC Use around the clock as needed 2 Bottle 0    aspirin 81 MG tablet Take 81 mg by mouth daily.  tamsulosin (FLOMAX) 0.4 MG capsule Take 1 capsule by mouth daily 30 capsule 11    acetaminophen (APAP EXTRA STRENGTH) 500 MG tablet Take 2 tablets by mouth every 8 hours as needed for Pain 120 tablet 0    ferrous sulfate 325 (65 FE) MG tablet Take 1 tablet by mouth 2 times daily 180 tablet 3    SILVERSEAL HYDROGEL DRESSING 4\"X5\" PADS Apply 1 Units topically daily 20 each 2       No Known Allergies    Family History   Problem Relation Age of Onset    Heart Attack Mother     Other Father         Unknown    Cancer Sister         Cervical       Social History     Social History    Marital status:      Spouse name: N/A    Number of children: N/A    Years of education: N/A     Occupational History    Not on file. Social History Main Topics    Smoking status: Never Smoker    Smokeless tobacco: Never Used    Alcohol use No    Drug use: No    Sexual activity: No     Other Topics Concern    Not on file     Social History Narrative    Social/Functional History                          Review of Systems  ROS as noted in HPI, 12 point ROS reviewed and otherwise negative. OBJECTIVE:  BP (!) 134/44   Pulse (!) 44   Temp 97.2 °F (36.2 °C) (Oral)   Resp 16   Ht 6' 2\" (1.88 m)   Wt 256 lb 2.8 oz (116.2 kg)   SpO2 100%   BMI 32.89 kg/m²   Patient is alert and oriented x 3 in NAD.      Vascular:   Palpable Dorsalis Pedis and Palpable Posterior Tibial Pulses B/L   Capillary Fill time < 3 seconds to B/L digits  Skin temperature warm to warm tibial tuberosity to the digits B/L  Hair growth absent to digits  No edema, No

## 2018-10-19 NOTE — CONSULTS
pronator drift. Fine finger  movements are symmetrical.  Strength is 5/5. The lower extremity  strength is 4/5. He is areflexic in the lower extremities with  vascular changes. No sensory levels are noted. We did not attempt to  walk him as yet. LABORATORY DATA:  Lab examination is therefore reviewed. His sodium  136, potassium 2.9, BUN of 18, creatinine 0.69. WBC count 6.6,  hemoglobin 13.7, hematocrit 40.3, and platelets of [phone],791. His lumbar spine MRI is pending. IMPRESSION:  Encephalopathy related to underlying infective process  superimposed upon a dementia. The patient has suggestion of possible  vascular dementia, fluctuation is possible. 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 At this time, I am not recommending  any intervention for the same. This can be treated as an outpatient. We will recommend MRI of the brain given that his atrial fibrillation  and underlying failure of anticoagulation can occur. His INR though  was therapeutic at 2.3. We also have suggestion of lumbar fracture  seen on a CT. He reportedly could not walk initially, but he is  walking now, one would not suspect clinical impression though MRI of  the lumbosacral spine will be updated. Thank you Dr. Yvan Carlson for asking us to assist in the care of the  patient.       Rita Gaming MD    D: 10/18/2018 13:09:24       T: 10/19/2018 6:19:40     [email or web address]  Job#: [id number]     Doc#: [id number]    CC:

## 2018-10-19 NOTE — PROGRESS NOTES
limbs.        NEUROLOGIC:    Speech Expression: appropriate quality, quantity and organization of sentences  Speech Comprehension: intact    Mental Status Exam: alert, oriented to person, place, and time    Motor Strength:   right upper extremity:   5/5  left upper extremity:  5/5  right lower extremity: 5/5    left lower extremity:  5/5      Medications:     sodium chloride flush  10 mL Intravenous 2 times per day    famotidine (PEPCID) injection  20 mg Intravenous BID    aspirin  81 mg Oral Daily    levothyroxine  112 mcg Oral Daily    simvastatin  10 mg Oral Nightly    tamsulosin  0.4 mg Oral Daily    warfarin  7.5 mg Oral Daily    insulin lispro  0-6 Units Subcutaneous TID WC    insulin lispro  0-3 Units Subcutaneous Nightly    ammonium lactate   Topical Daily    miconazole   Topical BID    vancomycin  1,500 mg Intravenous Q12H    piperacillin-tazobactam  3.375 g Intravenous Q8H    metoprolol tartrate  25 mg Oral Daily    amLODIPine  2.5 mg Oral Daily                 Active Problems:    Type 2 diabetes mellitus without complication (HCC)    Essential hypertension    Hyperlipidemia    Cirrhosis (HCC)    Chronic atrial fibrillation (HCC)    Acute encephalopathy    Sepsis ([facility]    Gait abnormality  Resolved Problems:    * No resolved hospital problems. *      Patient Active Problem List   Diagnosis    Type 2 diabetes mellitus without complication ([address]    Essential hypertension    Hyperlipidemia    Malignant neoplasm of ascending colon (HCC)    Cirrhosis (HCC)    Chronic atrial fibrillation (HCC)    Hypothyroidism    CHF (congestive heart failure) (HCC)    History of colon cancer    History of partial colectomy    Acute encephalopathy    Sepsis ([address]    Gait abnormality       Assessment/Plan    Encephalopathy related to underlying infective process  superimposed upon a dementia.  Patient is a much better today    The patient has suggestion of possible vascular dementia, fluctuation is

## 2018-10-20 VITALS
HEART RATE: 59 BPM | BODY MASS INDEX: 32.88 KG/M2 | SYSTOLIC BLOOD PRESSURE: 148 MMHG | DIASTOLIC BLOOD PRESSURE: 47 MMHG | OXYGEN SATURATION: 100 % | RESPIRATION RATE: 18 BRPM | WEIGHT: 256.17 LBS | TEMPERATURE: 97.3 F | HEIGHT: 74 IN

## 2018-10-20 LAB
ALBUMIN SERPL-MCNC: 3.1 G/DL (ref 3.9–4.9)
ALP BLD-CCNC: 71 U/L (ref 35–104)
ALT SERPL-CCNC: 20 U/L (ref 0–41)
ANION GAP SERPL CALCULATED.3IONS-SCNC: 14 MEQ/L (ref 7–13)
AST SERPL-CCNC: 38 U/L (ref 0–40)
BASOPHILS ABSOLUTE: 0 K/UL (ref 0–0.2)
BASOPHILS RELATIVE PERCENT: 0.5 %
BILIRUB SERPL-MCNC: 0.7 MG/DL (ref 0–1.2)
BLOOD CULTURE, ROUTINE: NORMAL
BLOOD CULTURE, ROUTINE: NORMAL
BUN BLDV-MCNC: 15 MG/DL (ref 8–23)
CALCIUM SERPL-MCNC: 8.4 MG/DL (ref 8.6–10.2)
CHLORIDE BLD-SCNC: 103 MEQ/L (ref 98–107)
CO2: 23 MEQ/L (ref 22–29)
CREAT SERPL-MCNC: 0.71 MG/DL (ref 0.7–1.2)
CULTURE, BLOOD 2: NORMAL
EOSINOPHILS ABSOLUTE: 0.2 K/UL (ref 0–0.7)
EOSINOPHILS RELATIVE PERCENT: 3 %
GFR AFRICAN AMERICAN: >60
GFR NON-AFRICAN AMERICAN: >60
GLOBULIN: 2.9 G/DL (ref 2.3–3.5)
GLUCOSE BLD-MCNC: 146 MG/DL (ref 60–115)
GLUCOSE BLD-MCNC: 78 MG/DL (ref 60–115)
GLUCOSE BLD-MCNC: 87 MG/DL (ref 74–109)
HCT VFR BLD CALC: 41 % (ref 42–52)
HEMOGLOBIN: 13.7 G/DL (ref 14–18)
LYMPHOCYTES ABSOLUTE: 0.9 K/UL (ref 1–4.8)
LYMPHOCYTES RELATIVE PERCENT: 11.7 %
MCH RBC QN AUTO: 31.4 PG (ref 27–31.3)
MCHC RBC AUTO-ENTMCNC: 33.4 % (ref 33–37)
MCV RBC AUTO: 94 FL (ref 80–100)
MONOCYTES ABSOLUTE: 0.4 K/UL (ref 0.2–0.8)
MONOCYTES RELATIVE PERCENT: 5.7 %
NEUTROPHILS ABSOLUTE: 6.1 K/UL (ref 1.4–6.5)
NEUTROPHILS RELATIVE PERCENT: 79.1 %
PDW BLD-RTO: 14.8 % (ref 11.5–14.5)
PERFORMED ON: ABNORMAL
PERFORMED ON: NORMAL
PLATELET # BLD: 143 K/UL (ref 130–400)
POTASSIUM SERPL-SCNC: 3.9 MEQ/L (ref 3.5–5.1)
RBC # BLD: 4.37 M/UL (ref 4.7–6.1)
SODIUM BLD-SCNC: 140 MEQ/L (ref 132–144)
TOTAL PROTEIN: 6 G/DL (ref 6.4–8.1)
WBC # BLD: 7.7 K/UL (ref 4.8–10.8)

## 2018-10-20 PROCEDURE — 85025 COMPLETE CBC W/AUTO DIFF WBC: CPT

## 2018-10-20 PROCEDURE — 6370000000 HC RX 637 (ALT 250 FOR IP): Performed by: PHYSICIAN ASSISTANT

## 2018-10-20 PROCEDURE — 97162 PT EVAL MOD COMPLEX 30 MIN: CPT

## 2018-10-20 PROCEDURE — 97116 GAIT TRAINING THERAPY: CPT

## 2018-10-20 PROCEDURE — G8978 MOBILITY CURRENT STATUS: HCPCS

## 2018-10-20 PROCEDURE — 6370000000 HC RX 637 (ALT 250 FOR IP): Performed by: INTERNAL MEDICINE

## 2018-10-20 PROCEDURE — 36415 COLL VENOUS BLD VENIPUNCTURE: CPT

## 2018-10-20 PROCEDURE — 80053 COMPREHEN METABOLIC PANEL: CPT

## 2018-10-20 PROCEDURE — G8979 MOBILITY GOAL STATUS: HCPCS

## 2018-10-20 RX ADMIN — MICONAZOLE NITRATE: 20 POWDER TOPICAL at 10:46

## 2018-10-20 RX ADMIN — METOPROLOL TARTRATE 25 MG: 25 TABLET ORAL at 10:45

## 2018-10-20 RX ADMIN — INSULIN LISPRO 1 UNITS: 100 INJECTION, SOLUTION INTRAVENOUS; SUBCUTANEOUS at 12:35

## 2018-10-20 RX ADMIN — AMLODIPINE BESYLATE 2.5 MG: 2.5 TABLET ORAL at 10:45

## 2018-10-20 RX ADMIN — LEVOTHYROXINE SODIUM 112 MCG: 112 TABLET ORAL at 06:04

## 2018-10-20 RX ADMIN — ASPIRIN 81 MG: 81 TABLET, CHEWABLE ORAL at 10:44

## 2018-10-20 RX ADMIN — TAMSULOSIN HYDROCHLORIDE 0.4 MG: 0.4 CAPSULE ORAL at 10:44

## 2018-10-20 ASSESSMENT — PAIN SCALES - GENERAL
PAINLEVEL_OUTOF10: 0

## 2018-10-20 NOTE — DISCHARGE INSTR - DIET

## 2018-10-20 NOTE — DISCHARGE SUMMARY
rate and rhythm, S1 and S2 normal, no murmur, rub   or gallop   Abdomen:     Soft, non-tender, bowel sounds active all four quadrants,     no masses, no organomegaly   Genitalia:    Normal male without lesion, discharge or tenderness   Rectal:    Normal tone, normal prostate, no masses or tenderness;    guaiac negative stool   Extremities:   Extremities normal, atraumatic, no cyanosis or edema   Pulses:   2+ and symmetric all extremities   Skin:   Skin color, texture, turgor normal, no rashes or lesions   Lymph nodes:   Cervical, supraclavicular, and axillary nodes normal   Neurologic:   CNII-XII intact. Normal strength, sensation and reflexes       throughout       Disposition: home    In process/preliminary results:  Outstanding Order Results     Date and Time Order Name Status Description    10/18/2018 1451 MRI LUMBAR SPINE WO CONTRAST In process     10/15/2018 1332 CULTURE BLOOD #1 Preliminary     10/15/2018 0744 Culture Blood #2 Preliminary     10/15/2018 0744 Culture Blood #1 Preliminary           Patient Instructions:   Current Discharge Medication List      CONTINUE these medications which have CHANGED    Details   metoprolol tartrate (LOPRESSOR) 25 MG tablet Take 0.5 tablets by mouth 2 times daily  Qty: 60 tablet, Refills: 3         CONTINUE these medications which have NOT CHANGED    Details   levothyroxine (SYNTHROID) 112 MCG tablet TAKE 1 TABLET BY MOUTH DAILY.   Qty: 90 tablet, Refills: 1    Associated Diagnoses: Hypothyroidism, unspecified type      omeprazole (PRILOSEC) 20 MG delayed release capsule Take 1 capsule by mouth daily  Qty: 90 capsule, Refills: 3    Associated Diagnoses: Gastroesophageal reflux disease without esophagitis      warfarin (COUMADIN) 7.5 MG tablet Take as directed  Qty: 90 tablet, Refills: 1    Associated Diagnoses: Chronic atrial fibrillation (HCC)      furosemide (LASIX) 40 MG tablet Take 1 tablet by mouth daily  Qty: 90 tablet, Refills: 3    Associated Diagnoses: Congestive unspecified part of colon ([facility] Venous stasis ulcer, unspecified laterality; Chronic atrial fibrillation (HCC)      Incontinence Supply Disposable (ATTENDS BARIATRIC BRIEF XXL) MISC Use around the clock as needed  Qty: 2 Bottle, Refills: 0    Associated Diagnoses: Incontinence of urine      aspirin 81 MG tablet Take 81 mg by mouth daily. tamsulosin (FLOMAX) 0.4 MG capsule Take 1 capsule by mouth daily  Qty: 30 capsule, Refills: 11    Associated Diagnoses: Urinary frequency      acetaminophen (APAP EXTRA STRENGTH) 500 MG tablet Take 2 tablets by mouth every 8 hours as needed for Pain  Qty: 120 tablet, Refills: 0      ferrous sulfate 325 (65 FE) MG tablet Take 1 tablet by mouth 2 times daily  Qty: 180 tablet, Refills: 3      SILVERSEAL HYDROGEL DRESSING 4\"X5\" PADS Apply 1 Units topically daily  Qty: 20 each, Refills: 2       !! - Potential duplicate medications found. Please discuss with provider. STOP taking these medications       metolazone (ZAROXOLYN) 5 MG tablet Comments:   Reason for Stopping:             Activity: activity as tolerated  Diet: cardiac diet  Wound Care: none needed    Follow-up with pcp  in 1 month.     Signed:  Thais Tenorio    10/20/2018  1:45 PM

## 2018-10-20 NOTE — FLOWSHEET NOTE
0800- In bed resting, no complaints offered. Dressing intact to left 2nd toe. Patient does at times get out of bed before nurse is at bedside, reinforced   Falls prevention with patient.

## 2018-10-20 NOTE — PROGRESS NOTES
Physical Therapy Med Surg Initial Assessment  Facility/Department: \[facility]\"" Apo  [facility]       NAME: Tristin Kumar  : 1935 ([de-identified] y.o.)  MRN: [id number]  CODE STATUS: Full Code    Date of Service: 10/20/2018    Patient Diagnosis(es): Sepsis ([address] [A41.9]   No chief complaint on file. Patient Active Problem List    Diagnosis Date Noted    Gait abnormality 10/16/2018    Acute encephalopathy 10/15/2018    Sepsis ([address] 10/15/2018    History of colon cancer 2018    History of partial colectomy 2018    Hypothyroidism 2016    CHF (congestive heart failure) ([address]     Chronic atrial fibrillation ([address] 2016    Cirrhosis ([address] 2014    Malignant neoplasm of ascending colon ([address] 2014    Type 2 diabetes mellitus without complication ([address]     Essential hypertension 2013    Hyperlipidemia 2013        Past Medical History:   Diagnosis Date    Atrial fibrillation ([address]     Carcinoma of ascending colon ([address]     CHF (congestive heart failure) (HCC)     Chronic atrial fibrillation ([address] 3/3/2016    Dementia     Foot ulcer, left ([address] 3/3/2016    Hyperlipidemia     Hypertension     Hypothyroidism     Type II or unspecified type diabetes mellitus without mention of complication, not stated as uncontrolled     diet     Past Surgical History:   Procedure Laterality Date    COLONOSCOPY  14    bx     OTHER SURGICAL HISTORY  14    Right colectomy with primary anastomosis and liver bx    RIGHT COLECTOMY  14    ANDMARK    UPPER GASTROINTESTINAL ENDOSCOPY  14    bx ike       Patient assessed for rehabilitation services?: Yes  Family / Caregiver Present: No    Restrictions:  Restrictions/Precautions: Fall Risk     SUBJECTIVE: Subjective: \"I am from [place]. Last time I was in the hospital I did the walking. \"  Response To Previous Treatment: Not applicable  Pre Treatment Pain Screening  Pain at present:

## 2018-10-21 ENCOUNTER — CARE COORDINATION (OUTPATIENT)
Dept: CASE MANAGEMENT | Age: 83
End: 2018-10-21

## 2018-10-25 ENCOUNTER — OFFICE VISIT (OUTPATIENT)
Dept: FAMILY MEDICINE CLINIC | Age: 83
End: 2018-10-25
Payer: MEDICARE

## 2018-10-25 VITALS
OXYGEN SATURATION: 98 % | DIASTOLIC BLOOD PRESSURE: 50 MMHG | TEMPERATURE: 97.5 F | WEIGHT: 256 LBS | SYSTOLIC BLOOD PRESSURE: 140 MMHG | BODY MASS INDEX: 32.87 KG/M2 | HEART RATE: 52 BPM

## 2018-10-25 DIAGNOSIS — H61.23 BILATERAL IMPACTED CERUMEN: ICD-10-CM

## 2018-10-25 DIAGNOSIS — I10 ESSENTIAL HYPERTENSION: ICD-10-CM

## 2018-10-25 DIAGNOSIS — G93.40 ACUTE ENCEPHALOPATHY: Primary | ICD-10-CM

## 2018-10-25 DIAGNOSIS — K74.60 CIRRHOSIS OF LIVER WITHOUT ASCITES, UNSPECIFIED HEPATIC CIRRHOSIS TYPE (HCC): ICD-10-CM

## 2018-10-25 DIAGNOSIS — Z23 ENCOUNTER FOR VACCINATION: ICD-10-CM

## 2018-10-25 DIAGNOSIS — Z85.038 HISTORY OF COLON CANCER: ICD-10-CM

## 2018-10-25 DIAGNOSIS — I48.20 CHRONIC ATRIAL FIBRILLATION (HCC): ICD-10-CM

## 2018-10-25 DIAGNOSIS — I50.9 CONGESTIVE HEART FAILURE, UNSPECIFIED HF CHRONICITY, UNSPECIFIED HEART FAILURE TYPE (HCC): ICD-10-CM

## 2018-10-25 DIAGNOSIS — A41.9 SEPSIS, DUE TO UNSPECIFIED ORGANISM: ICD-10-CM

## 2018-10-25 PROCEDURE — 99496 TRANSJ CARE MGMT HIGH F2F 7D: CPT | Performed by: FAMILY MEDICINE

## 2018-10-25 PROCEDURE — 1111F DSCHRG MED/CURRENT MED MERGE: CPT | Performed by: FAMILY MEDICINE

## 2018-10-25 ASSESSMENT — ENCOUNTER SYMPTOMS
CONSTIPATION: 0
ABDOMINAL PAIN: 0
SHORTNESS OF BREATH: 0
WHEEZING: 0
COUGH: 0
SORE THROAT: 0
DIARRHEA: 0
RHINORRHEA: 0

## 2018-10-25 NOTE — PROGRESS NOTES
uncontrolled     diet     Past Surgical History:   Procedure Laterality Date    COLONOSCOPY  5/9/14    bx     OTHER SURGICAL HISTORY  7/29/14    Right colectomy with primary anastomosis and liver bx    RIGHT COLECTOMY  07/29/14    ANDMARK    UPPER GASTROINTESTINAL ENDOSCOPY  5/9/14    bx ike     Social History     Social History    Marital status:      Spouse name: N/A    Number of children: N/A    Years of education: N/A     Occupational History    Not on file. Social History Main Topics    Smoking status: Never Smoker    Smokeless tobacco: Never Used    Alcohol use No    Drug use: No    Sexual activity: No     Other Topics Concern    Not on file     Social History Narrative                             No Known Allergies  Current Outpatient Prescriptions   Medication Sig Dispense Refill    zoster recombinant adjuvanted vaccine (SHINGRIX) 50 MCG/0.5ML SUSR injection Inject 0.5 mLs into the muscle once for 1 dose 0.5 mL 0    carbamide peroxide (AURO EARDROPS) 6.5 % otic solution Place 5 drops in ear(s) 2 times daily 15 mL 0    metoprolol tartrate (LOPRESSOR) 25 MG tablet Take 0.5 tablets by mouth 2 times daily 60 tablet 3    levothyroxine (SYNTHROID) 112 MCG tablet TAKE 1 TABLET BY MOUTH DAILY. 90 tablet 1    omeprazole (PRILOSEC) 20 MG delayed release capsule Take 1 capsule by mouth daily 90 capsule 3    tamsulosin (FLOMAX) 0.4 MG capsule Take 1 capsule by mouth daily 30 capsule 11    warfarin (COUMADIN) 7.5 MG tablet Take as directed (Patient taking differently: TAKE ONE TABLET BY MOUTH DAILY) 90 tablet 1    furosemide (LASIX) 40 MG tablet Take 1 tablet by mouth daily 90 tablet 3    lovastatin (MEVACOR) 20 MG tablet Take 1 tablet by mouth nightly 90 tablet 3    potassium chloride (KLOR-CON M) 20 MEQ extended release tablet Take 3 tablets by mouth daily (Patient taking differently: Take 50 mEq by mouth 2 times daily ) 225 tablet 3    Misc.  Devices MISC One lift chair, to be RECONCILED W/ CURRENT OUTPATIENT MED LIST   7. Sepsis, due to unspecified organism ([facility]  DE DISCHARGE MEDS RECONCILED W/ CURRENT OUTPATIENT MED LIST   8. Essential hypertension  DE DISCHARGE MEDS RECONCILED W/ CURRENT OUTPATIENT MED LIST   9. History of colon cancer  DE DISCHARGE MEDS RECONCILED W/ CURRENT OUTPATIENT MED LIST        Return if symptoms worsen or fail to improve.     Kimmy Almonte MD

## 2018-10-25 NOTE — PROGRESS NOTES
3 Post-Discharge Transitional Care Management Services or Hospital Follow Up      Clovis Ching   YOB: 1935    Date of Office Visit:  10/25/2018  Date of Hospital Admission: 10/15/18  Date of Hospital Discharge: 10/20/18  Readmission Risk Score(high >=14%. Medium >=10%):Readmission Risk Score: 20    Care management risk score Rising risk (score 2-5) and Complex Care (Scores >=6): 3     Non face to face  following discharge, date last encounter closed (first attempt may have been earlier): 10/23/2018  1:50 PM 10/23/2018  1:50 PM    Call initiated 2 business days of discharge: Yes     Patient Active Problem List   Diagnosis    Type 2 diabetes mellitus without complication ([facility]    Essential hypertension    Hyperlipidemia    Malignant neoplasm of ascending colon ([address]    Cirrhosis ([facility]    Chronic atrial fibrillation ([facility]    Hypothyroidism    CHF (congestive heart failure) ([facility]    History of colon cancer    History of partial colectomy    Acute encephalopathy    Sepsis ([facility]    Gait abnormality       No Known Allergies    Medications listed as ordered at the time of discharge from [facility] Medication Instructions JOSEPH:    Printed on:10/25/18 6530   Medication Information                      acetaminophen (APAP EXTRA STRENGTH) 500 MG tablet  Take 2 tablets by mouth every 8 hours as needed for Pain             aspirin 81 MG tablet  Take 81 mg by mouth daily.              carbamide peroxide (AURO EARDROPS) 6.5 % otic solution  Place 5 drops in ear(s) 2 times daily             fenofibrate 160 MG tablet  Take 1 tablet by mouth daily Note to pharmacy: Please dispense generic fenofibrate unless prescriber denotes ELDA             ferrous sulfate 325 (65 FE) MG tablet  Take 1 tablet by mouth 2 times daily             furosemide (LASIX) 40 MG tablet  Take 1 tablet by mouth daily             Hospital Bed MISC  by Does not apply route             Incontinence Supply Disposable (ATTENDS BARIATRIC BRIEF XXL) MISC  Use around the clock as needed             levothyroxine (SYNTHROID) 112 MCG tablet  TAKE 1 TABLET BY MOUTH DAILY. Lift Chair MISC  by Does not apply route             Lift Chair MISC  by Does not apply route             lovastatin (MEVACOR) 20 MG tablet  Take 1 tablet by mouth nightly             metoprolol tartrate (LOPRESSOR) 25 MG tablet  Take 0.5 tablets by mouth 2 times daily             Misc. Devices MISC  One lift chair, to be used daily Diagnosis CHF, obesity             nisoldipine (SULAR) 8.5 MG extended release tablet  TAKE 1 TABLET BY MOUTH EVERY DAY             omeprazole (PRILOSEC) 20 MG delayed release capsule  Take 1 capsule by mouth daily             potassium chloride (KLOR-CON M) 20 MEQ extended release tablet  Take 3 tablets by mouth daily             SILVERSEAL HYDROGEL DRESSING 4\"X5\" PADS  Apply 1 Units topically daily             tamsulosin (FLOMAX) 0.4 MG capsule  Take 1 capsule by mouth daily             warfarin (COUMADIN) 7.5 MG tablet  Take as directed             zoster recombinant adjuvanted vaccine [facility]) 50 MCG/0.5ML SUSR injection  Inject 0.5 mLs into the muscle once for 1 dose                   Medications marked \"taking\" at this time  Outpatient Prescriptions Marked as Taking for the 10/25/18 encounter (Office Visit) with Domenica Josue MD   Medication Sig Dispense Refill    zoster recombinant adjuvanted vaccine [facility]) 50 MCG/0.5ML SUSR injection Inject 0.5 mLs into the muscle once for 1 dose 0.5 mL 0    carbamide peroxide (AURO EARDROPS) 6.5 % otic solution Place 5 drops in ear(s) 2 times daily 15 mL 0    metoprolol tartrate (LOPRESSOR) 25 MG tablet Take 0.5 tablets by mouth 2 times daily 60 tablet 3    levothyroxine (SYNTHROID) 112 MCG tablet TAKE 1 TABLET BY MOUTH DAILY.  90 tablet 1    omeprazole (PRILOSEC) 20 MG delayed release capsule Take 1 capsule by mouth daily 90 capsule 3    tamsulosin (FLOMAX) 0.4 TempSrc: Temporal   SpO2: 98%   Weight: 256 lb (116.1 kg)     Body mass index is 32.87 kg/m². Wt Readings from Last 3 Encounters:   10/25/18 256 lb (116.1 kg)   10/18/18 256 lb 2.8 oz (116.2 kg)   04/06/18 268 lb (121.6 kg)     BP Readings from Last 3 Encounters:   10/25/18 (!) 140/50   10/19/18 (!) 148/47   10/15/18 (!) 113/57       Physical Exam        Assessment/Plan:  1. Encounter for vaccination    - zoster recombinant adjuvanted vaccine [facility]) 50 MCG/0.5ML SUSR injection; Inject 0.5 mLs into the muscle once for 1 dose  Dispense: 0.5 mL; Refill: 0    2. Bilateral impacted cerumen    - carbamide peroxide (AURO EARDROPS) 6.5 % otic solution; Place 5 drops in ear(s) 2 times daily  Dispense: 15 mL; Refill: 0    3. Cirrhosis of liver without ascites, unspecified hepatic cirrhosis type ([address]      4. Chronic atrial fibrillation (HCC)      5. Congestive heart failure, unspecified HF chronicity, unspecified heart failure type ([address]      6. Sepsis, due to unspecified organism ([address]      7. Essential hypertension      8. History of colon cancer      9.  Acute encephalopathy          Medical Decision Making: high complexity

## 2018-11-24 DIAGNOSIS — I50.9 CONGESTIVE HEART FAILURE (HCC): ICD-10-CM

## 2018-11-24 DIAGNOSIS — E78.5 HYPERLIPIDEMIA, UNSPECIFIED HYPERLIPIDEMIA TYPE: ICD-10-CM

## 2018-11-24 DIAGNOSIS — I48.20 CHRONIC ATRIAL FIBRILLATION (HCC): ICD-10-CM

## 2018-11-24 DIAGNOSIS — I10 ESSENTIAL HYPERTENSION: ICD-10-CM

## 2018-11-26 RX ORDER — WARFARIN SODIUM 7.5 MG/1
TABLET ORAL
Qty: 90 TABLET | Refills: 3 | Status: ON HOLD | OUTPATIENT
Start: 2018-11-26 | End: 2019-01-29 | Stop reason: HOSPADM

## 2018-11-26 RX ORDER — FENOFIBRATE 160 MG/1
TABLET ORAL
Qty: 90 TABLET | Refills: 3 | Status: SHIPPED | OUTPATIENT
Start: 2018-11-26 | End: 2019-11-11 | Stop reason: SDUPTHER

## 2018-11-26 RX ORDER — METOLAZONE 5 MG/1
TABLET ORAL
Qty: 90 TABLET | Refills: 3 | Status: SHIPPED | OUTPATIENT
Start: 2018-11-26 | End: 2019-02-04 | Stop reason: ALTCHOICE

## 2018-11-26 RX ORDER — NISOLDIPINE 8.5 MG/1
TABLET, FILM COATED, EXTENDED RELEASE ORAL
Qty: 90 TABLET | Refills: 3 | Status: SHIPPED | OUTPATIENT
Start: 2018-11-26 | End: 2019-11-11 | Stop reason: SDUPTHER

## 2019-01-24 ENCOUNTER — HOSPITAL ENCOUNTER (EMERGENCY)
Age: 84
Discharge: ANOTHER HOSPITAL | End: 2019-01-25
Attending: EMERGENCY MEDICINE
Payer: MEDICARE

## 2019-01-24 ENCOUNTER — APPOINTMENT (OUTPATIENT)
Dept: CT IMAGING | Age: 84
End: 2019-01-24
Payer: MEDICARE

## 2019-01-24 ENCOUNTER — APPOINTMENT (OUTPATIENT)
Dept: GENERAL RADIOLOGY | Age: 84
End: 2019-01-24
Payer: MEDICARE

## 2019-01-24 VITALS
OXYGEN SATURATION: 99 % | HEART RATE: 55 BPM | RESPIRATION RATE: 17 BRPM | BODY MASS INDEX: 38.6 KG/M2 | SYSTOLIC BLOOD PRESSURE: 198 MMHG | DIASTOLIC BLOOD PRESSURE: 84 MMHG | HEIGHT: 72 IN | TEMPERATURE: 98.4 F | WEIGHT: 285 LBS

## 2019-01-24 DIAGNOSIS — I50.20 SYSTOLIC CONGESTIVE HEART FAILURE, UNSPECIFIED HF CHRONICITY (HCC): ICD-10-CM

## 2019-01-24 DIAGNOSIS — R06.00 DYSPNEA, UNSPECIFIED TYPE: Primary | ICD-10-CM

## 2019-01-24 LAB
ALBUMIN SERPL-MCNC: 3.6 G/DL (ref 3.9–4.9)
ALP BLD-CCNC: 118 U/L (ref 35–104)
ALT SERPL-CCNC: 20 U/L (ref 0–41)
ANION GAP SERPL CALCULATED.3IONS-SCNC: 13 MEQ/L (ref 7–13)
AST SERPL-CCNC: 62 U/L (ref 0–40)
BASOPHILS ABSOLUTE: 0 K/UL (ref 0–0.2)
BASOPHILS RELATIVE PERCENT: 0.4 %
BILIRUB SERPL-MCNC: 0.6 MG/DL (ref 0–1.2)
BUN BLDV-MCNC: 18 MG/DL (ref 8–23)
CALCIUM SERPL-MCNC: 8.7 MG/DL (ref 8.6–10.2)
CHLORIDE BLD-SCNC: 109 MEQ/L (ref 98–107)
CO2: 24 MEQ/L (ref 22–29)
CREAT SERPL-MCNC: 0.99 MG/DL (ref 0.7–1.2)
D DIMER: 1.98 MG/L FEU (ref 0–0.5)
EKG ATRIAL RATE: 54 BPM
EKG Q-T INTERVAL: 424 MS
EKG QRS DURATION: 82 MS
EKG QTC CALCULATION (BAZETT): 424 MS
EKG R AXIS: 64 DEGREES
EKG T AXIS: 40 DEGREES
EKG VENTRICULAR RATE: 60 BPM
EOSINOPHILS ABSOLUTE: 0.1 K/UL (ref 0–0.7)
EOSINOPHILS RELATIVE PERCENT: 2.2 %
GFR AFRICAN AMERICAN: >60
GFR NON-AFRICAN AMERICAN: >60
GLOBULIN: 3.5 G/DL (ref 2.3–3.5)
GLUCOSE BLD-MCNC: 119 MG/DL (ref 74–109)
HCT VFR BLD CALC: 41.4 % (ref 42–52)
HEMOGLOBIN: 13.9 G/DL (ref 14–18)
LYMPHOCYTES ABSOLUTE: 0.8 K/UL (ref 1–4.8)
LYMPHOCYTES RELATIVE PERCENT: 14.3 %
MCH RBC QN AUTO: 31 PG (ref 27–31.3)
MCHC RBC AUTO-ENTMCNC: 33.5 % (ref 33–37)
MCV RBC AUTO: 92.4 FL (ref 80–100)
MONOCYTES ABSOLUTE: 0.3 K/UL (ref 0.2–0.8)
MONOCYTES RELATIVE PERCENT: 5.5 %
NEUTROPHILS ABSOLUTE: 4.4 K/UL (ref 1.4–6.5)
NEUTROPHILS RELATIVE PERCENT: 77.6 %
PDW BLD-RTO: 15.2 % (ref 11.5–14.5)
PLATELET # BLD: 171 K/UL (ref 130–400)
POTASSIUM SERPL-SCNC: 4.6 MEQ/L (ref 3.5–5.1)
PRO-BNP: 526 PG/ML
RBC # BLD: 4.48 M/UL (ref 4.7–6.1)
SODIUM BLD-SCNC: 146 MEQ/L (ref 132–144)
TOTAL PROTEIN: 7.1 G/DL (ref 6.4–8.1)
TROPONIN: <0.01 NG/ML (ref 0–0.01)
WBC # BLD: 5.7 K/UL (ref 4.8–10.8)

## 2019-01-24 PROCEDURE — 85025 COMPLETE CBC W/AUTO DIFF WBC: CPT

## 2019-01-24 PROCEDURE — 85379 FIBRIN DEGRADATION QUANT: CPT

## 2019-01-24 PROCEDURE — 36415 COLL VENOUS BLD VENIPUNCTURE: CPT

## 2019-01-24 PROCEDURE — 80053 COMPREHEN METABOLIC PANEL: CPT

## 2019-01-24 PROCEDURE — 71275 CT ANGIOGRAPHY CHEST: CPT

## 2019-01-24 PROCEDURE — 6360000004 HC RX CONTRAST MEDICATION: Performed by: EMERGENCY MEDICINE

## 2019-01-24 PROCEDURE — 71046 X-RAY EXAM CHEST 2 VIEWS: CPT

## 2019-01-24 PROCEDURE — 84484 ASSAY OF TROPONIN QUANT: CPT

## 2019-01-24 PROCEDURE — 93005 ELECTROCARDIOGRAM TRACING: CPT

## 2019-01-24 PROCEDURE — 83880 ASSAY OF NATRIURETIC PEPTIDE: CPT

## 2019-01-24 PROCEDURE — 99285 EMERGENCY DEPT VISIT HI MDM: CPT

## 2019-01-24 RX ORDER — SODIUM CHLORIDE 9 MG/ML
INJECTION, SOLUTION INTRAVENOUS CONTINUOUS
Status: DISCONTINUED | OUTPATIENT
Start: 2019-01-24 | End: 2019-01-25 | Stop reason: HOSPADM

## 2019-01-24 RX ADMIN — IOPAMIDOL 100 ML: 612 INJECTION, SOLUTION INTRAVENOUS at 21:20

## 2019-01-24 ASSESSMENT — ENCOUNTER SYMPTOMS
VOMITING: 0
CHEST TIGHTNESS: 0
CHOKING: 0
BLOOD IN STOOL: 0
SINUS PRESSURE: 0
EYE REDNESS: 0
EYE PAIN: 0
EYE DISCHARGE: 0
SORE THROAT: 0
FACIAL SWELLING: 0
STRIDOR: 0
COLOR CHANGE: 0
BACK PAIN: 0
COUGH: 0
CONSTIPATION: 0
EYE ITCHING: 0
ANAL BLEEDING: 0
ABDOMINAL DISTENTION: 0
ABDOMINAL PAIN: 0
PHOTOPHOBIA: 0
RHINORRHEA: 0
DIARRHEA: 0
WHEEZING: 0
VOICE CHANGE: 0
TROUBLE SWALLOWING: 0
NAUSEA: 0
SHORTNESS OF BREATH: 1

## 2019-01-25 ENCOUNTER — APPOINTMENT (OUTPATIENT)
Dept: CT IMAGING | Age: 84
DRG: 243 | End: 2019-01-25
Attending: INTERNAL MEDICINE
Payer: MEDICARE

## 2019-01-25 ENCOUNTER — HOSPITAL ENCOUNTER (INPATIENT)
Age: 84
LOS: 3 days | Discharge: HOME HEALTH | DRG: 243 | End: 2019-01-29
Attending: INTERNAL MEDICINE | Admitting: INTERNAL MEDICINE
Payer: MEDICARE

## 2019-01-25 ENCOUNTER — APPOINTMENT (OUTPATIENT)
Dept: ULTRASOUND IMAGING | Age: 84
DRG: 243 | End: 2019-01-25
Attending: INTERNAL MEDICINE
Payer: MEDICARE

## 2019-01-25 PROBLEM — I82.90 VTE (VENOUS THROMBOEMBOLISM): Status: ACTIVE | Noted: 2019-01-25

## 2019-01-25 LAB
INR BLD: 1.9
LV EF: 60 %
LVEF MODALITY: NORMAL
PRO-BNP: 506 PG/ML
PROTHROMBIN TIME: 18.9 SEC (ref 9–11.5)
TROPONIN: <0.01 NG/ML (ref 0–0.01)
TROPONIN: <0.01 NG/ML (ref 0–0.01)

## 2019-01-25 PROCEDURE — 93306 TTE W/DOPPLER COMPLETE: CPT

## 2019-01-25 PROCEDURE — 96376 TX/PRO/DX INJ SAME DRUG ADON: CPT

## 2019-01-25 PROCEDURE — 6360000002 HC RX W HCPCS: Performed by: INTERNAL MEDICINE

## 2019-01-25 PROCEDURE — 99222 1ST HOSP IP/OBS MODERATE 55: CPT | Performed by: INTERNAL MEDICINE

## 2019-01-25 PROCEDURE — 83880 ASSAY OF NATRIURETIC PEPTIDE: CPT

## 2019-01-25 PROCEDURE — 6370000000 HC RX 637 (ALT 250 FOR IP): Performed by: INTERNAL MEDICINE

## 2019-01-25 PROCEDURE — 2580000003 HC RX 258: Performed by: INTERNAL MEDICINE

## 2019-01-25 PROCEDURE — 93970 EXTREMITY STUDY: CPT

## 2019-01-25 PROCEDURE — 84484 ASSAY OF TROPONIN QUANT: CPT

## 2019-01-25 PROCEDURE — 71275 CT ANGIOGRAPHY CHEST: CPT

## 2019-01-25 PROCEDURE — 96372 THER/PROPH/DIAG INJ SC/IM: CPT

## 2019-01-25 PROCEDURE — 6360000004 HC RX CONTRAST MEDICATION: Performed by: INTERNAL MEDICINE

## 2019-01-25 PROCEDURE — 96374 THER/PROPH/DIAG INJ IV PUSH: CPT

## 2019-01-25 PROCEDURE — 36415 COLL VENOUS BLD VENIPUNCTURE: CPT

## 2019-01-25 PROCEDURE — G0378 HOSPITAL OBSERVATION PER HR: HCPCS

## 2019-01-25 PROCEDURE — 51702 INSERT TEMP BLADDER CATH: CPT

## 2019-01-25 PROCEDURE — 85610 PROTHROMBIN TIME: CPT

## 2019-01-25 RX ORDER — FUROSEMIDE 10 MG/ML
40 INJECTION INTRAMUSCULAR; INTRAVENOUS 2 TIMES DAILY
Status: DISCONTINUED | OUTPATIENT
Start: 2019-01-25 | End: 2019-01-29 | Stop reason: HOSPADM

## 2019-01-25 RX ORDER — SODIUM CHLORIDE 0.9 % (FLUSH) 0.9 %
10 SYRINGE (ML) INJECTION PRN
Status: DISCONTINUED | OUTPATIENT
Start: 2019-01-25 | End: 2019-01-29 | Stop reason: HOSPADM

## 2019-01-25 RX ORDER — ONDANSETRON 2 MG/ML
4 INJECTION INTRAMUSCULAR; INTRAVENOUS EVERY 6 HOURS PRN
Status: DISCONTINUED | OUTPATIENT
Start: 2019-01-25 | End: 2019-01-29 | Stop reason: HOSPADM

## 2019-01-25 RX ORDER — SODIUM CHLORIDE 0.9 % (FLUSH) 0.9 %
10 SYRINGE (ML) INJECTION EVERY 12 HOURS SCHEDULED
Status: DISCONTINUED | OUTPATIENT
Start: 2019-01-25 | End: 2019-01-29 | Stop reason: HOSPADM

## 2019-01-25 RX ORDER — POTASSIUM CHLORIDE 20 MEQ/1
20 TABLET, EXTENDED RELEASE ORAL 2 TIMES DAILY WITH MEALS
Status: DISCONTINUED | OUTPATIENT
Start: 2019-01-25 | End: 2019-01-26

## 2019-01-25 RX ORDER — ASPIRIN 81 MG/1
81 TABLET, CHEWABLE ORAL DAILY
Status: DISCONTINUED | OUTPATIENT
Start: 2019-01-26 | End: 2019-01-29 | Stop reason: HOSPADM

## 2019-01-25 RX ADMIN — FUROSEMIDE 40 MG: 10 INJECTION, SOLUTION INTRAMUSCULAR; INTRAVENOUS at 08:30

## 2019-01-25 RX ADMIN — METOPROLOL TARTRATE 25 MG: 25 TABLET ORAL at 08:30

## 2019-01-25 RX ADMIN — IOPAMIDOL 100 ML: 612 INJECTION, SOLUTION INTRAVENOUS at 08:54

## 2019-01-25 RX ADMIN — POTASSIUM CHLORIDE 20 MEQ: 20 TABLET, EXTENDED RELEASE ORAL at 08:30

## 2019-01-25 RX ADMIN — Medication 10 ML: at 08:31

## 2019-01-25 RX ADMIN — POTASSIUM CHLORIDE 20 MEQ: 20 TABLET, EXTENDED RELEASE ORAL at 16:36

## 2019-01-25 RX ADMIN — FUROSEMIDE 40 MG: 10 INJECTION, SOLUTION INTRAMUSCULAR; INTRAVENOUS at 16:36

## 2019-01-25 RX ADMIN — ENOXAPARIN SODIUM 135 MG: 150 INJECTION SUBCUTANEOUS at 11:32

## 2019-01-25 RX ADMIN — ENOXAPARIN SODIUM 135 MG: 150 INJECTION SUBCUTANEOUS at 03:25

## 2019-01-25 RX ADMIN — Medication 10 ML: at 21:13

## 2019-01-25 RX ADMIN — WARFARIN SODIUM 7.5 MG: 2.5 TABLET ORAL at 16:36

## 2019-01-25 RX ADMIN — METOPROLOL TARTRATE 25 MG: 25 TABLET ORAL at 21:11

## 2019-01-25 ASSESSMENT — PAIN SCALES - GENERAL
PAINLEVEL_OUTOF10: 0

## 2019-01-25 ASSESSMENT — ENCOUNTER SYMPTOMS
BLOOD IN STOOL: 0
RESPIRATORY NEGATIVE: 1
COUGH: 0
SHORTNESS OF BREATH: 0
EYES NEGATIVE: 1
STRIDOR: 0
CHEST TIGHTNESS: 0
WHEEZING: 0
NAUSEA: 0
GASTROINTESTINAL NEGATIVE: 1

## 2019-01-26 PROBLEM — R00.1 BRADYCARDIA: Status: ACTIVE | Noted: 2019-01-26

## 2019-01-26 LAB
ANION GAP SERPL CALCULATED.3IONS-SCNC: 11 MEQ/L (ref 7–13)
BUN BLDV-MCNC: 16 MG/DL (ref 8–23)
CALCIUM SERPL-MCNC: 8.6 MG/DL (ref 8.6–10.2)
CHLORIDE BLD-SCNC: 105 MEQ/L (ref 98–107)
CO2: 25 MEQ/L (ref 22–29)
CREAT SERPL-MCNC: 0.83 MG/DL (ref 0.7–1.2)
GFR AFRICAN AMERICAN: >60
GFR NON-AFRICAN AMERICAN: >60
GLUCOSE BLD-MCNC: 85 MG/DL (ref 74–109)
HCT VFR BLD CALC: 40.6 % (ref 42–52)
HEMOGLOBIN: 14 G/DL (ref 14–18)
INR BLD: 1.5
MCH RBC QN AUTO: 32.2 PG (ref 27–31.3)
MCHC RBC AUTO-ENTMCNC: 34.4 % (ref 33–37)
MCV RBC AUTO: 93.7 FL (ref 80–100)
PDW BLD-RTO: 15 % (ref 11.5–14.5)
PLATELET # BLD: 135 K/UL (ref 130–400)
POTASSIUM SERPL-SCNC: 3.4 MEQ/L (ref 3.5–5.1)
PROTHROMBIN TIME: 15.4 SEC (ref 9–11.5)
RBC # BLD: 4.33 M/UL (ref 4.7–6.1)
SODIUM BLD-SCNC: 141 MEQ/L (ref 132–144)
WBC # BLD: 7.4 K/UL (ref 4.8–10.8)

## 2019-01-26 PROCEDURE — 2580000003 HC RX 258: Performed by: INTERNAL MEDICINE

## 2019-01-26 PROCEDURE — 36415 COLL VENOUS BLD VENIPUNCTURE: CPT

## 2019-01-26 PROCEDURE — 6370000000 HC RX 637 (ALT 250 FOR IP): Performed by: INTERNAL MEDICINE

## 2019-01-26 PROCEDURE — 99233 SBSQ HOSP IP/OBS HIGH 50: CPT | Performed by: INTERNAL MEDICINE

## 2019-01-26 PROCEDURE — 2060000000 HC ICU INTERMEDIATE R&B

## 2019-01-26 PROCEDURE — 2500000003 HC RX 250 WO HCPCS: Performed by: INTERNAL MEDICINE

## 2019-01-26 PROCEDURE — 96376 TX/PRO/DX INJ SAME DRUG ADON: CPT

## 2019-01-26 PROCEDURE — 85610 PROTHROMBIN TIME: CPT

## 2019-01-26 PROCEDURE — 85027 COMPLETE CBC AUTOMATED: CPT

## 2019-01-26 PROCEDURE — 51702 INSERT TEMP BLADDER CATH: CPT

## 2019-01-26 PROCEDURE — 6360000002 HC RX W HCPCS: Performed by: INTERNAL MEDICINE

## 2019-01-26 PROCEDURE — 80048 BASIC METABOLIC PNL TOTAL CA: CPT

## 2019-01-26 PROCEDURE — 96372 THER/PROPH/DIAG INJ SC/IM: CPT

## 2019-01-26 PROCEDURE — 93005 ELECTROCARDIOGRAM TRACING: CPT

## 2019-01-26 RX ORDER — WARFARIN SODIUM 10 MG/1
10 TABLET ORAL
Status: DISCONTINUED | OUTPATIENT
Start: 2019-01-26 | End: 2019-01-26

## 2019-01-26 RX ORDER — POTASSIUM CHLORIDE 20 MEQ/1
40 TABLET, EXTENDED RELEASE ORAL 2 TIMES DAILY WITH MEALS
Status: DISCONTINUED | OUTPATIENT
Start: 2019-01-26 | End: 2019-01-29 | Stop reason: HOSPADM

## 2019-01-26 RX ADMIN — FUROSEMIDE 40 MG: 10 INJECTION, SOLUTION INTRAMUSCULAR; INTRAVENOUS at 17:49

## 2019-01-26 RX ADMIN — FUROSEMIDE 40 MG: 10 INJECTION, SOLUTION INTRAMUSCULAR; INTRAVENOUS at 09:15

## 2019-01-26 RX ADMIN — MICONAZOLE NITRATE: 20 POWDER TOPICAL at 22:23

## 2019-01-26 RX ADMIN — ENOXAPARIN SODIUM 130 MG: 150 INJECTION SUBCUTANEOUS at 11:05

## 2019-01-26 RX ADMIN — Medication 10 ML: at 22:20

## 2019-01-26 RX ADMIN — POTASSIUM CHLORIDE 40 MEQ: 20 TABLET, EXTENDED RELEASE ORAL at 16:41

## 2019-01-26 RX ADMIN — Medication 10 ML: at 09:16

## 2019-01-26 RX ADMIN — ASPIRIN 81 MG 81 MG: 81 TABLET ORAL at 08:48

## 2019-01-26 RX ADMIN — ENOXAPARIN SODIUM 130 MG: 150 INJECTION SUBCUTANEOUS at 22:18

## 2019-01-26 RX ADMIN — POTASSIUM CHLORIDE 20 MEQ: 20 TABLET, EXTENDED RELEASE ORAL at 08:49

## 2019-01-26 ASSESSMENT — ENCOUNTER SYMPTOMS
BLOOD IN STOOL: 0
COUGH: 0
STRIDOR: 0
WHEEZING: 0
GASTROINTESTINAL NEGATIVE: 1
NAUSEA: 0
CHEST TIGHTNESS: 0
EYES NEGATIVE: 1
RESPIRATORY NEGATIVE: 1
SHORTNESS OF BREATH: 0

## 2019-01-26 ASSESSMENT — PAIN SCALES - GENERAL: PAINLEVEL_OUTOF10: 0

## 2019-01-27 LAB
ANION GAP SERPL CALCULATED.3IONS-SCNC: 10 MEQ/L (ref 7–13)
BUN BLDV-MCNC: 19 MG/DL (ref 8–23)
CALCIUM SERPL-MCNC: 8.3 MG/DL (ref 8.6–10.2)
CHLORIDE BLD-SCNC: 103 MEQ/L (ref 98–107)
CO2: 27 MEQ/L (ref 22–29)
CREAT SERPL-MCNC: 0.91 MG/DL (ref 0.7–1.2)
GFR AFRICAN AMERICAN: >60
GFR NON-AFRICAN AMERICAN: >60
GLUCOSE BLD-MCNC: 86 MG/DL (ref 74–109)
HCT VFR BLD CALC: 39.7 % (ref 42–52)
HEMOGLOBIN: 13.7 G/DL (ref 14–18)
INR BLD: 1.5
MCH RBC QN AUTO: 32.5 PG (ref 27–31.3)
MCHC RBC AUTO-ENTMCNC: 34.6 % (ref 33–37)
MCV RBC AUTO: 93.8 FL (ref 80–100)
PDW BLD-RTO: 14.7 % (ref 11.5–14.5)
PLATELET # BLD: 146 K/UL (ref 130–400)
POTASSIUM SERPL-SCNC: 4 MEQ/L (ref 3.5–5.1)
PROTHROMBIN TIME: 15.2 SEC (ref 9–11.5)
RBC # BLD: 4.23 M/UL (ref 4.7–6.1)
SODIUM BLD-SCNC: 140 MEQ/L (ref 132–144)
WBC # BLD: 5.5 K/UL (ref 4.8–10.8)

## 2019-01-27 PROCEDURE — 6370000000 HC RX 637 (ALT 250 FOR IP): Performed by: INTERNAL MEDICINE

## 2019-01-27 PROCEDURE — 80048 BASIC METABOLIC PNL TOTAL CA: CPT

## 2019-01-27 PROCEDURE — 93000 ELECTROCARDIOGRAM COMPLETE: CPT | Performed by: INTERNAL MEDICINE

## 2019-01-27 PROCEDURE — 51702 INSERT TEMP BLADDER CATH: CPT

## 2019-01-27 PROCEDURE — 93005 ELECTROCARDIOGRAM TRACING: CPT

## 2019-01-27 PROCEDURE — 85610 PROTHROMBIN TIME: CPT

## 2019-01-27 PROCEDURE — 2580000003 HC RX 258: Performed by: INTERNAL MEDICINE

## 2019-01-27 PROCEDURE — 99233 SBSQ HOSP IP/OBS HIGH 50: CPT | Performed by: INTERNAL MEDICINE

## 2019-01-27 PROCEDURE — 6360000002 HC RX W HCPCS: Performed by: INTERNAL MEDICINE

## 2019-01-27 PROCEDURE — 36415 COLL VENOUS BLD VENIPUNCTURE: CPT

## 2019-01-27 PROCEDURE — 2060000000 HC ICU INTERMEDIATE R&B

## 2019-01-27 PROCEDURE — 85027 COMPLETE CBC AUTOMATED: CPT

## 2019-01-27 RX ADMIN — POTASSIUM CHLORIDE 40 MEQ: 20 TABLET, EXTENDED RELEASE ORAL at 16:34

## 2019-01-27 RX ADMIN — MICONAZOLE NITRATE: 20 POWDER TOPICAL at 08:50

## 2019-01-27 RX ADMIN — POTASSIUM CHLORIDE 40 MEQ: 20 TABLET, EXTENDED RELEASE ORAL at 08:49

## 2019-01-27 RX ADMIN — FUROSEMIDE 40 MG: 10 INJECTION, SOLUTION INTRAMUSCULAR; INTRAVENOUS at 08:49

## 2019-01-27 RX ADMIN — Medication 10 ML: at 08:49

## 2019-01-27 RX ADMIN — ASPIRIN 81 MG 81 MG: 81 TABLET ORAL at 08:49

## 2019-01-27 RX ADMIN — FUROSEMIDE 40 MG: 10 INJECTION, SOLUTION INTRAMUSCULAR; INTRAVENOUS at 16:34

## 2019-01-27 ASSESSMENT — ENCOUNTER SYMPTOMS
WHEEZING: 0
NAUSEA: 0
GASTROINTESTINAL NEGATIVE: 1
SHORTNESS OF BREATH: 0
RESPIRATORY NEGATIVE: 1
BLOOD IN STOOL: 0
EYES NEGATIVE: 1
CHEST TIGHTNESS: 0
COUGH: 0
STRIDOR: 0

## 2019-01-27 ASSESSMENT — PAIN SCALES - GENERAL
PAINLEVEL_OUTOF10: 0

## 2019-01-28 ENCOUNTER — APPOINTMENT (OUTPATIENT)
Dept: GENERAL RADIOLOGY | Age: 84
DRG: 243 | End: 2019-01-28
Attending: INTERNAL MEDICINE
Payer: MEDICARE

## 2019-01-28 ENCOUNTER — APPOINTMENT (OUTPATIENT)
Dept: CARDIAC CATH/INVASIVE PROCEDURES | Age: 84
DRG: 243 | End: 2019-01-28
Attending: INTERNAL MEDICINE
Payer: MEDICARE

## 2019-01-28 LAB
ANION GAP SERPL CALCULATED.3IONS-SCNC: 12 MEQ/L (ref 7–13)
BUN BLDV-MCNC: 19 MG/DL (ref 8–23)
CALCIUM SERPL-MCNC: 8.6 MG/DL (ref 8.6–10.2)
CHLORIDE BLD-SCNC: 104 MEQ/L (ref 98–107)
CO2: 26 MEQ/L (ref 22–29)
CREAT SERPL-MCNC: 0.92 MG/DL (ref 0.7–1.2)
GFR AFRICAN AMERICAN: >60
GFR NON-AFRICAN AMERICAN: >60
GLUCOSE BLD-MCNC: 87 MG/DL (ref 74–109)
HCT VFR BLD CALC: 38.3 % (ref 42–52)
HEMOGLOBIN: 13.3 G/DL (ref 14–18)
INR BLD: 1.4
MCH RBC QN AUTO: 32.7 PG (ref 27–31.3)
MCHC RBC AUTO-ENTMCNC: 34.8 % (ref 33–37)
MCV RBC AUTO: 93.9 FL (ref 80–100)
PDW BLD-RTO: 14.9 % (ref 11.5–14.5)
PLATELET # BLD: 138 K/UL (ref 130–400)
POTASSIUM SERPL-SCNC: 3.8 MEQ/L (ref 3.5–5.1)
PROTHROMBIN TIME: 13.6 SEC (ref 9–11.5)
RBC # BLD: 4.08 M/UL (ref 4.7–6.1)
SODIUM BLD-SCNC: 142 MEQ/L (ref 132–144)
WBC # BLD: 4.9 K/UL (ref 4.8–10.8)

## 2019-01-28 PROCEDURE — 93005 ELECTROCARDIOGRAM TRACING: CPT

## 2019-01-28 PROCEDURE — 02H63JZ INSERTION OF PACEMAKER LEAD INTO RIGHT ATRIUM, PERCUTANEOUS APPROACH: ICD-10-PCS | Performed by: INTERNAL MEDICINE

## 2019-01-28 PROCEDURE — 6370000000 HC RX 637 (ALT 250 FOR IP): Performed by: INTERNAL MEDICINE

## 2019-01-28 PROCEDURE — 2580000003 HC RX 258

## 2019-01-28 PROCEDURE — 2060000000 HC ICU INTERMEDIATE R&B

## 2019-01-28 PROCEDURE — 36005 INJECTION EXT VENOGRAPHY: CPT | Performed by: INTERNAL MEDICINE

## 2019-01-28 PROCEDURE — 2580000003 HC RX 258: Performed by: INTERNAL MEDICINE

## 2019-01-28 PROCEDURE — 75820 VEIN X-RAY ARM/LEG: CPT | Performed by: INTERNAL MEDICINE

## 2019-01-28 PROCEDURE — 51702 INSERT TEMP BLADDER CATH: CPT

## 2019-01-28 PROCEDURE — 93000 ELECTROCARDIOGRAM COMPLETE: CPT | Performed by: INTERNAL MEDICINE

## 2019-01-28 PROCEDURE — 85027 COMPLETE CBC AUTOMATED: CPT

## 2019-01-28 PROCEDURE — 85610 PROTHROMBIN TIME: CPT

## 2019-01-28 PROCEDURE — 33208 INSRT HEART PM ATRIAL & VENT: CPT | Performed by: INTERNAL MEDICINE

## 2019-01-28 PROCEDURE — 02HK3JZ INSERTION OF PACEMAKER LEAD INTO RIGHT VENTRICLE, PERCUTANEOUS APPROACH: ICD-10-PCS | Performed by: INTERNAL MEDICINE

## 2019-01-28 PROCEDURE — 80048 BASIC METABOLIC PNL TOTAL CA: CPT

## 2019-01-28 PROCEDURE — 2580000003 HC RX 258: Performed by: NURSE PRACTITIONER

## 2019-01-28 PROCEDURE — 2500000003 HC RX 250 WO HCPCS

## 2019-01-28 PROCEDURE — 6360000002 HC RX W HCPCS: Performed by: INTERNAL MEDICINE

## 2019-01-28 PROCEDURE — 6360000002 HC RX W HCPCS

## 2019-01-28 PROCEDURE — 6370000000 HC RX 637 (ALT 250 FOR IP)

## 2019-01-28 PROCEDURE — 6360000002 HC RX W HCPCS: Performed by: NURSE PRACTITIONER

## 2019-01-28 PROCEDURE — 36415 COLL VENOUS BLD VENIPUNCTURE: CPT

## 2019-01-28 PROCEDURE — 0JH606Z INSERTION OF PACEMAKER, DUAL CHAMBER INTO CHEST SUBCUTANEOUS TISSUE AND FASCIA, OPEN APPROACH: ICD-10-PCS | Performed by: INTERNAL MEDICINE

## 2019-01-28 PROCEDURE — 71045 X-RAY EXAM CHEST 1 VIEW: CPT

## 2019-01-28 RX ORDER — SODIUM CHLORIDE 9 MG/ML
INJECTION, SOLUTION INTRAVENOUS
Status: COMPLETED
Start: 2019-01-28 | End: 2019-01-28

## 2019-01-28 RX ORDER — ACETAMINOPHEN 325 MG/1
650 TABLET ORAL EVERY 4 HOURS PRN
Status: DISCONTINUED | OUTPATIENT
Start: 2019-01-28 | End: 2019-01-29 | Stop reason: HOSPADM

## 2019-01-28 RX ORDER — TRAMADOL HYDROCHLORIDE 50 MG/1
50 TABLET ORAL EVERY 6 HOURS PRN
Status: DISCONTINUED | OUTPATIENT
Start: 2019-01-28 | End: 2019-01-29 | Stop reason: HOSPADM

## 2019-01-28 RX ORDER — TRAMADOL HYDROCHLORIDE 50 MG/1
100 TABLET ORAL EVERY 6 HOURS PRN
Status: DISCONTINUED | OUTPATIENT
Start: 2019-01-28 | End: 2019-01-29 | Stop reason: HOSPADM

## 2019-01-28 RX ORDER — SODIUM CHLORIDE 0.9 % (FLUSH) 0.9 %
10 SYRINGE (ML) INJECTION PRN
Status: DISCONTINUED | OUTPATIENT
Start: 2019-01-28 | End: 2019-01-29 | Stop reason: HOSPADM

## 2019-01-28 RX ORDER — SODIUM CHLORIDE 0.9 % (FLUSH) 0.9 %
10 SYRINGE (ML) INJECTION EVERY 12 HOURS SCHEDULED
Status: DISCONTINUED | OUTPATIENT
Start: 2019-01-28 | End: 2019-01-28

## 2019-01-28 RX ADMIN — WARFARIN SODIUM 7.5 MG: 2.5 TABLET ORAL at 20:29

## 2019-01-28 RX ADMIN — ASPIRIN 81 MG 81 MG: 81 TABLET ORAL at 08:30

## 2019-01-28 RX ADMIN — MICONAZOLE NITRATE: 20 POWDER TOPICAL at 20:29

## 2019-01-28 RX ADMIN — DEXTROSE MONOHYDRATE 1 G: 5 INJECTION INTRAVENOUS at 11:04

## 2019-01-28 RX ADMIN — Medication 10 ML: at 08:30

## 2019-01-28 RX ADMIN — Medication 10 ML: at 20:30

## 2019-01-28 RX ADMIN — FUROSEMIDE 40 MG: 10 INJECTION, SOLUTION INTRAMUSCULAR; INTRAVENOUS at 08:30

## 2019-01-28 RX ADMIN — FUROSEMIDE 40 MG: 10 INJECTION, SOLUTION INTRAMUSCULAR; INTRAVENOUS at 16:29

## 2019-01-28 RX ADMIN — SODIUM CHLORIDE 1000 ML: 9 INJECTION, SOLUTION INTRAVENOUS at 09:55

## 2019-01-28 RX ADMIN — POTASSIUM CHLORIDE 40 MEQ: 20 TABLET, EXTENDED RELEASE ORAL at 08:30

## 2019-01-28 RX ADMIN — MICONAZOLE NITRATE: 20 POWDER TOPICAL at 08:29

## 2019-01-28 RX ADMIN — POTASSIUM CHLORIDE 40 MEQ: 20 TABLET, EXTENDED RELEASE ORAL at 16:29

## 2019-01-28 ASSESSMENT — PAIN SCALES - GENERAL
PAINLEVEL_OUTOF10: 0

## 2019-01-29 VITALS
OXYGEN SATURATION: 94 % | SYSTOLIC BLOOD PRESSURE: 140 MMHG | TEMPERATURE: 97.7 F | BODY MASS INDEX: 36.57 KG/M2 | RESPIRATION RATE: 18 BRPM | HEART RATE: 84 BPM | WEIGHT: 270 LBS | DIASTOLIC BLOOD PRESSURE: 56 MMHG | HEIGHT: 72 IN

## 2019-01-29 LAB
ANION GAP SERPL CALCULATED.3IONS-SCNC: 13 MEQ/L (ref 7–13)
BUN BLDV-MCNC: 17 MG/DL (ref 8–23)
CALCIUM SERPL-MCNC: 8.9 MG/DL (ref 8.6–10.2)
CHLORIDE BLD-SCNC: 103 MEQ/L (ref 98–107)
CO2: 23 MEQ/L (ref 22–29)
CREAT SERPL-MCNC: 0.81 MG/DL (ref 0.7–1.2)
EKG ATRIAL RATE: 312 BPM
EKG ATRIAL RATE: 46 BPM
EKG ATRIAL RATE: 61 BPM
EKG ATRIAL RATE: 76 BPM
EKG Q-T INTERVAL: 448 MS
EKG Q-T INTERVAL: 504 MS
EKG Q-T INTERVAL: 504 MS
EKG Q-T INTERVAL: 542 MS
EKG QRS DURATION: 178 MS
EKG QRS DURATION: 90 MS
EKG QRS DURATION: 94 MS
EKG QRS DURATION: 96 MS
EKG QTC CALCULATION (BAZETT): 408 MS
EKG QTC CALCULATION (BAZETT): 425 MS
EKG QTC CALCULATION (BAZETT): 430 MS
EKG QTC CALCULATION (BAZETT): 511 MS
EKG R AXIS: -87 DEGREES
EKG R AXIS: 54 DEGREES
EKG R AXIS: 62 DEGREES
EKG R AXIS: 72 DEGREES
EKG T AXIS: 22 DEGREES
EKG T AXIS: 39 DEGREES
EKG T AXIS: 41 DEGREES
EKG T AXIS: 78 DEGREES
EKG VENTRICULAR RATE: 38 BPM
EKG VENTRICULAR RATE: 43 BPM
EKG VENTRICULAR RATE: 50 BPM
EKG VENTRICULAR RATE: 62 BPM
GFR AFRICAN AMERICAN: >60
GFR NON-AFRICAN AMERICAN: >60
GLUCOSE BLD-MCNC: 85 MG/DL (ref 60–115)
GLUCOSE BLD-MCNC: 98 MG/DL (ref 74–109)
HCT VFR BLD CALC: 41.1 % (ref 42–52)
HEMOGLOBIN: 14.2 G/DL (ref 14–18)
INR BLD: 1.3
MCH RBC QN AUTO: 32.3 PG (ref 27–31.3)
MCHC RBC AUTO-ENTMCNC: 34.5 % (ref 33–37)
MCV RBC AUTO: 93.6 FL (ref 80–100)
PDW BLD-RTO: 15 % (ref 11.5–14.5)
PERFORMED ON: NORMAL
PLATELET # BLD: 124 K/UL (ref 130–400)
PLATELET SLIDE REVIEW: ABNORMAL
POTASSIUM SERPL-SCNC: 3.9 MEQ/L (ref 3.5–5.1)
PROTHROMBIN TIME: 13 SEC (ref 9–11.5)
RBC # BLD: 4.39 M/UL (ref 4.7–6.1)
REJECTED TEST: NORMAL
SLIDE REVIEW: ABNORMAL
SODIUM BLD-SCNC: 139 MEQ/L (ref 132–144)
WBC # BLD: 5.5 K/UL (ref 4.8–10.8)

## 2019-01-29 PROCEDURE — 99232 SBSQ HOSP IP/OBS MODERATE 35: CPT | Performed by: INTERNAL MEDICINE

## 2019-01-29 PROCEDURE — 80048 BASIC METABOLIC PNL TOTAL CA: CPT

## 2019-01-29 PROCEDURE — 93010 ELECTROCARDIOGRAM REPORT: CPT | Performed by: INTERNAL MEDICINE

## 2019-01-29 PROCEDURE — 6360000002 HC RX W HCPCS: Performed by: INTERNAL MEDICINE

## 2019-01-29 PROCEDURE — C1894 INTRO/SHEATH, NON-LASER: HCPCS

## 2019-01-29 PROCEDURE — C1898 LEAD, PMKR, OTHER THAN TRANS: HCPCS

## 2019-01-29 PROCEDURE — 97535 SELF CARE MNGMENT TRAINING: CPT

## 2019-01-29 PROCEDURE — 85610 PROTHROMBIN TIME: CPT

## 2019-01-29 PROCEDURE — 36415 COLL VENOUS BLD VENIPUNCTURE: CPT

## 2019-01-29 PROCEDURE — 2580000003 HC RX 258: Performed by: INTERNAL MEDICINE

## 2019-01-29 PROCEDURE — G8978 MOBILITY CURRENT STATUS: HCPCS

## 2019-01-29 PROCEDURE — G8988 SELF CARE GOAL STATUS: HCPCS

## 2019-01-29 PROCEDURE — 93005 ELECTROCARDIOGRAM TRACING: CPT

## 2019-01-29 PROCEDURE — 6370000000 HC RX 637 (ALT 250 FOR IP): Performed by: INTERNAL MEDICINE

## 2019-01-29 PROCEDURE — C1785 PMKR, DUAL, RATE-RESP: HCPCS

## 2019-01-29 PROCEDURE — 97166 OT EVAL MOD COMPLEX 45 MIN: CPT

## 2019-01-29 PROCEDURE — 97162 PT EVAL MOD COMPLEX 30 MIN: CPT

## 2019-01-29 PROCEDURE — G8987 SELF CARE CURRENT STATUS: HCPCS

## 2019-01-29 PROCEDURE — 85027 COMPLETE CBC AUTOMATED: CPT

## 2019-01-29 PROCEDURE — 93000 ELECTROCARDIOGRAM COMPLETE: CPT | Performed by: INTERNAL MEDICINE

## 2019-01-29 PROCEDURE — G8979 MOBILITY GOAL STATUS: HCPCS

## 2019-01-29 RX ADMIN — Medication 10 ML: at 08:15

## 2019-01-29 RX ADMIN — MICONAZOLE NITRATE: 20 POWDER TOPICAL at 08:19

## 2019-01-29 RX ADMIN — ASPIRIN 81 MG 81 MG: 81 TABLET ORAL at 08:14

## 2019-01-29 RX ADMIN — POTASSIUM CHLORIDE 40 MEQ: 20 TABLET, EXTENDED RELEASE ORAL at 08:15

## 2019-01-29 RX ADMIN — FUROSEMIDE 40 MG: 10 INJECTION, SOLUTION INTRAMUSCULAR; INTRAVENOUS at 08:15

## 2019-01-29 ASSESSMENT — PAIN SCALES - GENERAL
PAINLEVEL_OUTOF10: 0

## 2019-01-29 ASSESSMENT — ENCOUNTER SYMPTOMS
GASTROINTESTINAL NEGATIVE: 1
STRIDOR: 0
RESPIRATORY NEGATIVE: 1
NAUSEA: 0
EYES NEGATIVE: 1
BLOOD IN STOOL: 0
WHEEZING: 0
CHEST TIGHTNESS: 0
COUGH: 0
SHORTNESS OF BREATH: 0

## 2019-01-30 ENCOUNTER — CARE COORDINATION (OUTPATIENT)
Dept: CASE MANAGEMENT | Age: 84
End: 2019-01-30

## 2019-02-01 ENCOUNTER — OFFICE VISIT (OUTPATIENT)
Dept: FAMILY MEDICINE CLINIC | Age: 84
End: 2019-02-01
Payer: MEDICARE

## 2019-02-01 VITALS
HEART RATE: 62 BPM | BODY MASS INDEX: 36.67 KG/M2 | WEIGHT: 270.4 LBS | SYSTOLIC BLOOD PRESSURE: 130 MMHG | DIASTOLIC BLOOD PRESSURE: 64 MMHG | OXYGEN SATURATION: 98 % | TEMPERATURE: 97.9 F

## 2019-02-01 DIAGNOSIS — I50.9 CONGESTIVE HEART FAILURE, UNSPECIFIED HF CHRONICITY, UNSPECIFIED HEART FAILURE TYPE (HCC): ICD-10-CM

## 2019-02-01 DIAGNOSIS — Z09 HOSPITAL DISCHARGE FOLLOW-UP: Primary | ICD-10-CM

## 2019-02-01 DIAGNOSIS — I82.90 VTE (VENOUS THROMBOEMBOLISM): ICD-10-CM

## 2019-02-01 DIAGNOSIS — Z85.038 HISTORY OF COLON CANCER: ICD-10-CM

## 2019-02-01 DIAGNOSIS — R19.7 DIARRHEA, UNSPECIFIED TYPE: ICD-10-CM

## 2019-02-01 DIAGNOSIS — I48.20 CHRONIC ATRIAL FIBRILLATION (HCC): ICD-10-CM

## 2019-02-01 DIAGNOSIS — R00.1 BRADYCARDIA: ICD-10-CM

## 2019-02-01 DIAGNOSIS — I10 ESSENTIAL HYPERTENSION: ICD-10-CM

## 2019-02-01 PROBLEM — A41.9 SEPSIS (HCC): Status: RESOLVED | Noted: 2018-10-15 | Resolved: 2019-02-01

## 2019-02-01 PROCEDURE — 1036F TOBACCO NON-USER: CPT | Performed by: FAMILY MEDICINE

## 2019-02-01 PROCEDURE — 4040F PNEUMOC VAC/ADMIN/RCVD: CPT | Performed by: FAMILY MEDICINE

## 2019-02-01 PROCEDURE — 1123F ACP DISCUSS/DSCN MKR DOCD: CPT | Performed by: FAMILY MEDICINE

## 2019-02-01 PROCEDURE — 99214 OFFICE O/P EST MOD 30 MIN: CPT | Performed by: FAMILY MEDICINE

## 2019-02-01 PROCEDURE — 1111F DSCHRG MED/CURRENT MED MERGE: CPT | Performed by: FAMILY MEDICINE

## 2019-02-01 PROCEDURE — G8427 DOCREV CUR MEDS BY ELIG CLIN: HCPCS | Performed by: FAMILY MEDICINE

## 2019-02-01 PROCEDURE — 1101F PT FALLS ASSESS-DOCD LE1/YR: CPT | Performed by: FAMILY MEDICINE

## 2019-02-01 PROCEDURE — G8417 CALC BMI ABV UP PARAM F/U: HCPCS | Performed by: FAMILY MEDICINE

## 2019-02-01 PROCEDURE — G8482 FLU IMMUNIZE ORDER/ADMIN: HCPCS | Performed by: FAMILY MEDICINE

## 2019-02-01 ASSESSMENT — ENCOUNTER SYMPTOMS
WHEEZING: 0
SHORTNESS OF BREATH: 0
DIARRHEA: 0
COUGH: 0
SORE THROAT: 0
ABDOMINAL PAIN: 0
RHINORRHEA: 0
CONSTIPATION: 0

## 2019-02-01 ASSESSMENT — PATIENT HEALTH QUESTIONNAIRE - PHQ9
SUM OF ALL RESPONSES TO PHQ9 QUESTIONS 1 & 2: 0
SUM OF ALL RESPONSES TO PHQ QUESTIONS 1-9: 0
2. FEELING DOWN, DEPRESSED OR HOPELESS: 0
1. LITTLE INTEREST OR PLEASURE IN DOING THINGS: 0
SUM OF ALL RESPONSES TO PHQ QUESTIONS 1-9: 0

## 2019-02-04 ENCOUNTER — OFFICE VISIT (OUTPATIENT)
Dept: CARDIOLOGY CLINIC | Age: 84
End: 2019-02-04
Payer: MEDICARE

## 2019-02-04 VITALS
HEIGHT: 72 IN | OXYGEN SATURATION: 96 % | BODY MASS INDEX: 36.44 KG/M2 | RESPIRATION RATE: 16 BRPM | HEART RATE: 65 BPM | WEIGHT: 269 LBS | SYSTOLIC BLOOD PRESSURE: 128 MMHG | DIASTOLIC BLOOD PRESSURE: 70 MMHG

## 2019-02-04 DIAGNOSIS — I49.5 SSS (SICK SINUS SYNDROME) (HCC): ICD-10-CM

## 2019-02-04 DIAGNOSIS — I48.20 CHRONIC ATRIAL FIBRILLATION (HCC): Primary | ICD-10-CM

## 2019-02-04 DIAGNOSIS — I82.90 VTE (VENOUS THROMBOEMBOLISM): ICD-10-CM

## 2019-02-04 DIAGNOSIS — Z95.0 PACEMAKER: ICD-10-CM

## 2019-02-04 DIAGNOSIS — I10 ESSENTIAL HYPERTENSION: Primary | ICD-10-CM

## 2019-02-04 DIAGNOSIS — I50.9 CONGESTIVE HEART FAILURE, UNSPECIFIED HF CHRONICITY, UNSPECIFIED HEART FAILURE TYPE (HCC): ICD-10-CM

## 2019-02-04 DIAGNOSIS — E78.5 HYPERLIPIDEMIA, UNSPECIFIED HYPERLIPIDEMIA TYPE: ICD-10-CM

## 2019-02-04 DIAGNOSIS — I48.20 CHRONIC ATRIAL FIBRILLATION (HCC): ICD-10-CM

## 2019-02-04 PROCEDURE — 99214 OFFICE O/P EST MOD 30 MIN: CPT | Performed by: INTERNAL MEDICINE

## 2019-02-04 PROCEDURE — G8417 CALC BMI ABV UP PARAM F/U: HCPCS | Performed by: INTERNAL MEDICINE

## 2019-02-04 PROCEDURE — 1036F TOBACCO NON-USER: CPT | Performed by: INTERNAL MEDICINE

## 2019-02-04 PROCEDURE — G8482 FLU IMMUNIZE ORDER/ADMIN: HCPCS | Performed by: INTERNAL MEDICINE

## 2019-02-04 PROCEDURE — 1123F ACP DISCUSS/DSCN MKR DOCD: CPT | Performed by: INTERNAL MEDICINE

## 2019-02-04 PROCEDURE — G8427 DOCREV CUR MEDS BY ELIG CLIN: HCPCS | Performed by: INTERNAL MEDICINE

## 2019-02-04 PROCEDURE — 4040F PNEUMOC VAC/ADMIN/RCVD: CPT | Performed by: INTERNAL MEDICINE

## 2019-02-04 PROCEDURE — 1101F PT FALLS ASSESS-DOCD LE1/YR: CPT | Performed by: INTERNAL MEDICINE

## 2019-02-04 ASSESSMENT — ENCOUNTER SYMPTOMS
NAUSEA: 0
CHEST TIGHTNESS: 0
EYES NEGATIVE: 1
SHORTNESS OF BREATH: 0
GASTROINTESTINAL NEGATIVE: 1
COUGH: 0
WHEEZING: 0
BLOOD IN STOOL: 0
STRIDOR: 0
RESPIRATORY NEGATIVE: 1

## 2019-02-14 DIAGNOSIS — E03.9 HYPOTHYROIDISM, UNSPECIFIED TYPE: ICD-10-CM

## 2019-02-14 DIAGNOSIS — E87.6 HYPOKALEMIA: ICD-10-CM

## 2019-02-15 RX ORDER — LEVOTHYROXINE SODIUM 112 UG/1
TABLET ORAL
Qty: 90 TABLET | Refills: 3 | Status: SHIPPED | OUTPATIENT
Start: 2019-02-15 | End: 2019-07-31

## 2019-02-15 RX ORDER — POTASSIUM CHLORIDE 20 MEQ/1
60 TABLET, EXTENDED RELEASE ORAL DAILY
Qty: 225 TABLET | Refills: 3 | Status: SHIPPED | OUTPATIENT
Start: 2019-02-15 | End: 2019-10-04

## 2019-03-08 DIAGNOSIS — R07.89 OTHER CHEST PAIN: ICD-10-CM

## 2019-03-08 DIAGNOSIS — I48.0 PAROXYSMAL ATRIAL FIBRILLATION (HCC): ICD-10-CM

## 2019-03-08 DIAGNOSIS — R26.9 ABNORMALITY OF GAIT: ICD-10-CM

## 2019-03-08 DIAGNOSIS — R94.31 ABNORMAL EKG: Primary | ICD-10-CM

## 2019-03-08 DIAGNOSIS — R06.09 DOE (DYSPNEA ON EXERTION): ICD-10-CM

## 2019-03-12 ENCOUNTER — HOSPITAL ENCOUNTER (OUTPATIENT)
Dept: NON INVASIVE DIAGNOSTICS | Age: 84
Discharge: HOME | End: 2019-03-12
Payer: MEDICARE

## 2019-03-12 ENCOUNTER — HOSPITAL ENCOUNTER (OUTPATIENT)
Dept: NUCLEAR MEDICINE | Age: 84
Discharge: HOME | End: 2019-03-14
Payer: MEDICARE

## 2019-03-12 VITALS — DIASTOLIC BLOOD PRESSURE: 70 MMHG | HEART RATE: 58 BPM | SYSTOLIC BLOOD PRESSURE: 126 MMHG

## 2019-03-12 DIAGNOSIS — R07.89 OTHER CHEST PAIN: ICD-10-CM

## 2019-03-12 DIAGNOSIS — R94.31 ABNORMAL EKG: ICD-10-CM

## 2019-03-12 DIAGNOSIS — I48.0 PAROXYSMAL ATRIAL FIBRILLATION (HCC): ICD-10-CM

## 2019-03-12 DIAGNOSIS — R06.09 DOE (DYSPNEA ON EXERTION): ICD-10-CM

## 2019-03-12 DIAGNOSIS — R26.9 ABNORMALITY OF GAIT: ICD-10-CM

## 2019-03-12 PROCEDURE — 2580000003 HC RX 258: Performed by: INTERNAL MEDICINE

## 2019-03-12 PROCEDURE — 93017 CV STRESS TEST TRACING ONLY: CPT

## 2019-03-12 PROCEDURE — 6360000002 HC RX W HCPCS: Performed by: INTERNAL MEDICINE

## 2019-03-12 PROCEDURE — A9502 TC99M TETROFOSMIN: HCPCS | Performed by: INTERNAL MEDICINE

## 2019-03-12 PROCEDURE — 3430000000 HC RX DIAGNOSTIC RADIOPHARMACEUTICAL: Performed by: INTERNAL MEDICINE

## 2019-03-12 PROCEDURE — 78452 HT MUSCLE IMAGE SPECT MULT: CPT

## 2019-03-12 RX ORDER — SODIUM CHLORIDE 0.9 % (FLUSH) 0.9 %
10 SYRINGE (ML) INJECTION PRN
Status: DISCONTINUED | OUTPATIENT
Start: 2019-03-12 | End: 2019-03-15 | Stop reason: HOSPADM

## 2019-03-12 RX ADMIN — Medication 10 ML: at 09:44

## 2019-03-12 RX ADMIN — TETROFOSMIN 11 MILLICURIE: 1.38 INJECTION, POWDER, LYOPHILIZED, FOR SOLUTION INTRAVENOUS at 07:42

## 2019-03-12 RX ADMIN — Medication 10 ML: at 07:43

## 2019-03-12 RX ADMIN — TETROFOSMIN 32.7 MILLICURIE: 1.38 INJECTION, POWDER, LYOPHILIZED, FOR SOLUTION INTRAVENOUS at 09:43

## 2019-03-12 RX ADMIN — Medication 10 ML: at 09:42

## 2019-03-12 RX ADMIN — REGADENOSON 0.4 MG: 0.08 INJECTION, SOLUTION INTRAVENOUS at 09:42

## 2019-03-13 PROCEDURE — 78452 HT MUSCLE IMAGE SPECT MULT: CPT | Performed by: INTERNAL MEDICINE

## 2019-03-13 PROCEDURE — 93016 CV STRESS TEST SUPVJ ONLY: CPT | Performed by: INTERNAL MEDICINE

## 2019-03-13 PROCEDURE — 93018 CV STRESS TEST I&R ONLY: CPT | Performed by: INTERNAL MEDICINE

## 2019-03-22 ENCOUNTER — HOSPITAL ENCOUNTER (OUTPATIENT)
Dept: CARDIOLOGY | Age: 84
Discharge: HOME | End: 2019-03-22
Payer: MEDICARE

## 2019-03-22 PROCEDURE — 93280 PM DEVICE PROGR EVAL DUAL: CPT

## 2019-05-08 RX ORDER — RIVAROXABAN 20 MG/1
TABLET, FILM COATED ORAL
Qty: 30 TABLET | Refills: 5 | Status: SHIPPED | OUTPATIENT
Start: 2019-05-08 | End: 2019-08-12 | Stop reason: SDUPTHER

## 2019-05-11 DIAGNOSIS — E78.5 HYPERLIPIDEMIA, UNSPECIFIED HYPERLIPIDEMIA TYPE: ICD-10-CM

## 2019-05-11 DIAGNOSIS — I50.9 CONGESTIVE HEART FAILURE (HCC): ICD-10-CM

## 2019-05-13 RX ORDER — FUROSEMIDE 40 MG/1
TABLET ORAL
Qty: 90 TABLET | Refills: 3 | Status: SHIPPED | OUTPATIENT
Start: 2019-05-13

## 2019-05-13 RX ORDER — LOVASTATIN 20 MG/1
TABLET ORAL
Qty: 90 TABLET | Refills: 3 | Status: SHIPPED | OUTPATIENT
Start: 2019-05-13

## 2019-05-13 NOTE — TELEPHONE ENCOUNTER
Rx requested:  Requested Prescriptions     Pending Prescriptions Disp Refills    furosemide (LASIX) 40 MG tablet [Pharmacy Med Name: FUROSEMIDE 40 MG TABLET] 90 tablet 3     Sig: TAKE 1 TABLET BY MOUTH DAILY    lovastatin (MEVACOR) 20 MG tablet [Pharmacy Med Name: LOVASTATIN 20 MG TABLET] 90 tablet 3     Sig: TAKE 1 TABLET BY MOUTH NIGHTLY       Last Office Visit:   2/1/2019    Last Labs:  1/29*/2019    Last filled:  multiple    Next Visit Date:  Future Appointments   Date Time Provider Britney Arizmendi   5/31/2019  1:30 PM Catrina Craft MD [facility]

## 2019-05-31 ENCOUNTER — OFFICE VISIT (OUTPATIENT)
Dept: CARDIOLOGY CLINIC | Age: 84
End: 2019-05-31
Payer: MEDICARE

## 2019-05-31 VITALS
BODY MASS INDEX: 37.93 KG/M2 | DIASTOLIC BLOOD PRESSURE: 74 MMHG | SYSTOLIC BLOOD PRESSURE: 132 MMHG | WEIGHT: 280 LBS | RESPIRATION RATE: 16 BRPM | HEIGHT: 72 IN | HEART RATE: 87 BPM | OXYGEN SATURATION: 99 %

## 2019-05-31 DIAGNOSIS — I49.5 SSS (SICK SINUS SYNDROME) (HCC): ICD-10-CM

## 2019-05-31 DIAGNOSIS — Z95.0 PACEMAKER: ICD-10-CM

## 2019-05-31 DIAGNOSIS — I10 ESSENTIAL HYPERTENSION: Primary | ICD-10-CM

## 2019-05-31 DIAGNOSIS — E78.5 HYPERLIPIDEMIA, UNSPECIFIED HYPERLIPIDEMIA TYPE: ICD-10-CM

## 2019-05-31 DIAGNOSIS — I50.9 CONGESTIVE HEART FAILURE, UNSPECIFIED HF CHRONICITY, UNSPECIFIED HEART FAILURE TYPE (HCC): ICD-10-CM

## 2019-05-31 DIAGNOSIS — I48.20 CHRONIC ATRIAL FIBRILLATION (HCC): ICD-10-CM

## 2019-05-31 DIAGNOSIS — R00.1 BRADYCARDIA: ICD-10-CM

## 2019-05-31 PROCEDURE — 1123F ACP DISCUSS/DSCN MKR DOCD: CPT | Performed by: INTERNAL MEDICINE

## 2019-05-31 PROCEDURE — 4040F PNEUMOC VAC/ADMIN/RCVD: CPT | Performed by: INTERNAL MEDICINE

## 2019-05-31 PROCEDURE — G8417 CALC BMI ABV UP PARAM F/U: HCPCS | Performed by: INTERNAL MEDICINE

## 2019-05-31 PROCEDURE — 1036F TOBACCO NON-USER: CPT | Performed by: INTERNAL MEDICINE

## 2019-05-31 PROCEDURE — G8427 DOCREV CUR MEDS BY ELIG CLIN: HCPCS | Performed by: INTERNAL MEDICINE

## 2019-05-31 PROCEDURE — 99214 OFFICE O/P EST MOD 30 MIN: CPT | Performed by: INTERNAL MEDICINE

## 2019-05-31 ASSESSMENT — ENCOUNTER SYMPTOMS
SHORTNESS OF BREATH: 0
STRIDOR: 0
GASTROINTESTINAL NEGATIVE: 1
EYES NEGATIVE: 1
NAUSEA: 0
RESPIRATORY NEGATIVE: 1
CHEST TIGHTNESS: 0
COUGH: 0
WHEEZING: 0
BLOOD IN STOOL: 0

## 2019-05-31 NOTE — PROGRESS NOTES
None   Relationships    Social connections:     Talks on phone: None     Gets together: None     Attends Restorationism service: None     Active member of club or organization: None     Attends meetings of clubs or organizations: None     Relationship status: None    Intimate partner violence:     Fear of current or ex partner: None     Emotionally abused: None     Physically abused: None     Forced sexual activity: None   Other Topics Concern    None   Social History Narrative                           No Known Allergies    Current Outpatient Medications   Medication Sig Dispense Refill    furosemide (LASIX) 40 MG tablet TAKE 1 TABLET BY MOUTH DAILY 90 tablet 3    lovastatin (MEVACOR) 20 MG tablet TAKE 1 TABLET BY MOUTH NIGHTLY 90 tablet 3    XARELTO 20 MG TABS tablet TAKE 1 TABLET BY MOUTH DAILY WITH BREAKFAST (Start on 1/31/19) 30 tablet 5    potassium chloride (KLOR-CON M) 20 MEQ extended release tablet Take 3 tablets by mouth daily 225 tablet 3    levothyroxine (SYNTHROID) 112 MCG tablet TAKE 1 TABLET BY MOUTH DAILY. 90 tablet 3    fenofibrate 160 MG tablet Take 1 tablet by mouth daily Note to pharmacy: Please dispense generic fenofibrate unless prescriber denotes ELDA 90 tablet 3    nisoldipine (SULAR) 8.5 MG extended release tablet TAKE 1 TABLET BY MOUTH EVERY DAY 90 tablet 3    metoprolol tartrate (LOPRESSOR) 25 MG tablet Take 0.5 tablets by mouth 2 times daily 60 tablet 3    omeprazole (PRILOSEC) 20 MG delayed release capsule Take 1 capsule by mouth daily 90 capsule 3    Misc.  Devices MISC One lift chair, to be used daily Diagnosis CHF, obesity 1 each 0    Lift Chair MISC by Does not apply route 1 each [address] by Does not apply route 1 each 0    SILVERSEAL HYDROGEL DRESSING 4\"X5\" PADS Apply 1 Units topically daily 20 each 2    Incontinence Supply Disposable (ATTENDS BARIATRIC BRIEF XXL) MISC Use around the clock as needed 2 Bottle 0    aspirin 81 MG tablet Take 81 mg by mouth daily.      ferrous sulfate 325 (65 FE) MG tablet Take 1 tablet by mouth 2 times daily 180 tablet 3     No current facility-administered medications for this visit. Review of Systems:   Review of Systems   Constitutional: Negative. Negative for diaphoresis and fatigue. HENT: Negative. Eyes: Negative. Respiratory: Negative. Negative for cough, chest tightness, shortness of breath, wheezing and stridor. Cardiovascular: Positive for leg swelling. Negative for chest pain and palpitations. Gastrointestinal: Negative. Negative for blood in stool and nausea. Genitourinary: Negative. Musculoskeletal: Negative. Skin: Negative. Neurological: Negative. Negative for dizziness, syncope, weakness and light-headedness. Hematological: Negative. Psychiatric/Behavioral: Negative. Physical Examination:    /74 (Site: Left Upper Arm, Position: Sitting, Cuff Size: Large Adult)   Pulse 87   Resp 16   Ht 6' (1.829 m)   Wt 280 lb (127 kg)   SpO2 99%   BMI 37.97 kg/m²    Physical Exam   Constitutional: He appears healthy. No distress. HENT:   Normal cephalic and Atraumatic   Eyes: Pupils are equal, round, and reactive to light. Neck: Normal range of motion and thyroid normal. Neck supple. No JVD present. No neck adenopathy. No thyromegaly present. Cardiovascular: Normal rate, regular rhythm, intact distal pulses and normal pulses. Murmur heard. Pulmonary/Chest: Effort normal and breath sounds normal. He has no wheezes. He has no rales. He exhibits no tenderness. Abdominal: Soft. Bowel sounds are normal. There is no tenderness. Musculoskeletal: Normal range of motion. He exhibits edema (mild). He exhibits no tenderness. Neurological: He is alert and oriented to person, place, and time. Skin: Skin is warm. No cyanosis. Nails show no clubbing.        LABS:  CBC:   Lab Results   Component Value Date    WBC 5.5 01/29/2019    RBC 4.39 01/29/2019    RBC 4.50 01/31/2012 HGB 14.2 01/29/2019    HCT 41.1 01/29/2019    MCV 93.6 01/29/2019    MCH 32.3 01/29/2019    MCHC 34.5 01/29/2019    RDW 15.0 01/29/2019     01/29/2019    MPV 10.0 08/13/2015     Lipids:  Lab Results   Component Value Date    CHOL 143 03/30/2018    CHOL 139 08/26/2016    CHOL 152 08/13/2015     Lab Results   Component Value Date    TRIG 119 03/30/2018    TRIG 128 08/26/2016    TRIG 133 08/13/2015     Lab Results   Component Value Date    HDL 37 (L) 03/30/2018    HDL 38 (L) 08/26/2016    HDL 41 08/13/2015     Lab Results   Component Value Date    LDLCALC 82 03/30/2018    LDLCALC 75 08/26/2016    LDLCALC 84 08/13/2015     No results found for: LABVLDL, VLDL  Lab Results   Component Value Date    CHOLHDLRATIO 4.3 01/31/2012     CMP:    Lab Results   Component Value Date     01/29/2019    K 3.9 01/29/2019    K 3.1 10/15/2018     01/29/2019    CO2 23 01/29/2019    BUN 17 01/29/2019    CREATININE 0.81 01/29/2019    GFRAA >60.0 01/29/2019    LABGLOM >60.0 01/29/2019    GLUCOSE 98 01/29/2019    GLUCOSE 90 01/31/2012    PROT 7.1 01/24/2019    LABALBU 3.6 01/24/2019    LABALBU 4.1 01/31/2012    CALCIUM 8.9 01/29/2019    BILITOT 0.6 01/24/2019    ALKPHOS 118 01/24/2019    AST 62 01/24/2019    ALT 20 01/24/2019     BMP:    Lab Results   Component Value Date     01/29/2019    K 3.9 01/29/2019    K 3.1 10/15/2018     01/29/2019    CO2 23 01/29/2019    BUN 17 01/29/2019    LABALBU 3.6 01/24/2019    LABALBU 4.1 01/31/2012    CREATININE 0.81 01/29/2019    CALCIUM 8.9 01/29/2019    GFRAA >60.0 01/29/2019    LABGLOM >60.0 01/29/2019    GLUCOSE 98 01/29/2019    GLUCOSE 90 01/31/2012     Magnesium:    Lab Results   Component Value Date    MG 1.9 10/17/2018     TSH:  Lab Results   Component Value Date    TSH 3.470 08/26/2016       Patient Active Problem List   Diagnosis    Type 2 diabetes mellitus without complication ([facility]    Essential hypertension    Hyperlipidemia    [facility] of ascending colon [facility]    Cirrhosis ([facility]    Chronic atrial fibrillation (HCC)    Hypothyroidism    CHF (congestive heart failure) (HCC)    History of colon cancer    History of partial colectomy    Acute encephalopathy    Gait abnormality    VTE (venous thromboembolism)    Bradycardia    Pacemaker    SSS (sick sinus syndrome) ([facility])       There are no discontinued medications. Modified Medications    No medications on file       No orders of the defined types were placed in this encounter. Assessment/Plan:    1. Essential hypertension  Stable     2. Hyperlipidemia, unspecified hyperlipidemia type   statin     3. Chronic atrial fibrillation (HCC)  OAC and rate control     4. Congestive heart failure, unspecified HF chronicity, unspecified heart failure type ([facility])   class lll     5. Bradycardia  Stable     6. Pacemaker   interrogate     7. SSS (sick sinus syndrome) ([facility])          Counseling:  Heart Healthy Lifestyle, Improve BMI, Low Salt Diet, Take Precautions to Prevent Falls and Walk Daily    Return in about 4 months (around 9/30/2019) for Cardiovascular care. .      Electronically signed by Zakiya Fitzpatrick MD on 5/31/2019 at 1:14 PM

## 2019-06-23 ENCOUNTER — APPOINTMENT (OUTPATIENT)
Dept: GENERAL RADIOLOGY | Age: 84
End: 2019-06-23
Payer: MEDICARE

## 2019-06-23 ENCOUNTER — HOSPITAL ENCOUNTER (EMERGENCY)
Age: 84
Discharge: HOME | End: 2019-06-23
Attending: EMERGENCY MEDICINE
Payer: MEDICARE

## 2019-06-23 VITALS
SYSTOLIC BLOOD PRESSURE: 135 MMHG | RESPIRATION RATE: 18 BRPM | BODY MASS INDEX: 37.93 KG/M2 | WEIGHT: 280 LBS | OXYGEN SATURATION: 99 % | TEMPERATURE: 97.6 F | DIASTOLIC BLOOD PRESSURE: 63 MMHG | HEIGHT: 72 IN | HEART RATE: 64 BPM

## 2019-06-23 DIAGNOSIS — J40 BRONCHITIS: ICD-10-CM

## 2019-06-23 DIAGNOSIS — R06.00 DYSPNEA AND RESPIRATORY ABNORMALITIES: Primary | ICD-10-CM

## 2019-06-23 DIAGNOSIS — R06.89 DYSPNEA AND RESPIRATORY ABNORMALITIES: Primary | ICD-10-CM

## 2019-06-23 LAB
ALBUMIN SERPL-MCNC: 4 G/DL (ref 3.5–4.6)
ALP BLD-CCNC: 114 U/L (ref 35–104)
ALT SERPL-CCNC: 13 U/L (ref 0–41)
ANION GAP SERPL CALCULATED.3IONS-SCNC: 15 MEQ/L (ref 9–15)
AST SERPL-CCNC: 48 U/L (ref 0–40)
BASOPHILS ABSOLUTE: 0 K/UL (ref 0–0.2)
BASOPHILS RELATIVE PERCENT: 0.3 %
BILIRUB SERPL-MCNC: 2.1 MG/DL (ref 0.2–0.7)
BUN BLDV-MCNC: 28 MG/DL (ref 8–23)
CALCIUM SERPL-MCNC: 9.1 MG/DL (ref 8.5–9.9)
CHLORIDE BLD-SCNC: 100 MEQ/L (ref 95–107)
CO2: 23 MEQ/L (ref 20–31)
CREAT SERPL-MCNC: 0.89 MG/DL (ref 0.7–1.2)
EKG ATRIAL RATE: 61 BPM
EKG Q-T INTERVAL: 442 MS
EKG QRS DURATION: 96 MS
EKG QTC CALCULATION (BAZETT): 448 MS
EKG R AXIS: 73 DEGREES
EKG T AXIS: -80 DEGREES
EKG VENTRICULAR RATE: 62 BPM
EOSINOPHILS ABSOLUTE: 0.2 K/UL (ref 0–0.7)
EOSINOPHILS RELATIVE PERCENT: 2.6 %
GFR AFRICAN AMERICAN: >60
GFR NON-AFRICAN AMERICAN: >60
GLOBULIN: 3.6 G/DL (ref 2.3–3.5)
GLUCOSE BLD-MCNC: 115 MG/DL (ref 70–99)
HCT VFR BLD CALC: 43 % (ref 42–52)
HEMOGLOBIN: 14.3 G/DL (ref 14–18)
LACTIC ACID: 2.5 MMOL/L (ref 0.5–2.2)
LYMPHOCYTES ABSOLUTE: 0.8 K/UL (ref 1–4.8)
LYMPHOCYTES RELATIVE PERCENT: 9.7 %
MAGNESIUM: 2.1 MG/DL (ref 1.7–2.4)
MCH RBC QN AUTO: 32.4 PG (ref 27–31.3)
MCHC RBC AUTO-ENTMCNC: 33.3 % (ref 33–37)
MCV RBC AUTO: 97.3 FL (ref 80–100)
MONOCYTES ABSOLUTE: 0.4 K/UL (ref 0.2–0.8)
MONOCYTES RELATIVE PERCENT: 4.8 %
NEUTROPHILS ABSOLUTE: 7.1 K/UL (ref 1.4–6.5)
NEUTROPHILS RELATIVE PERCENT: 82.6 %
PDW BLD-RTO: 15.7 % (ref 11.5–14.5)
PLATELET # BLD: 214 K/UL (ref 130–400)
POTASSIUM SERPL-SCNC: 4.1 MEQ/L (ref 3.4–4.9)
PRO-BNP: 1202 PG/ML
RBC # BLD: 4.42 M/UL (ref 4.7–6.1)
SODIUM BLD-SCNC: 138 MEQ/L (ref 135–144)
TOTAL PROTEIN: 7.6 G/DL (ref 6.3–8)
TROPONIN: <0.01 NG/ML (ref 0–0.01)
WBC # BLD: 8.6 K/UL (ref 4.8–10.8)

## 2019-06-23 PROCEDURE — 36415 COLL VENOUS BLD VENIPUNCTURE: CPT

## 2019-06-23 PROCEDURE — 2580000003 HC RX 258: Performed by: EMERGENCY MEDICINE

## 2019-06-23 PROCEDURE — 94640 AIRWAY INHALATION TREATMENT: CPT

## 2019-06-23 PROCEDURE — 85025 COMPLETE CBC W/AUTO DIFF WBC: CPT

## 2019-06-23 PROCEDURE — 99284 EMERGENCY DEPT VISIT MOD MDM: CPT

## 2019-06-23 PROCEDURE — 83605 ASSAY OF LACTIC ACID: CPT

## 2019-06-23 PROCEDURE — 83880 ASSAY OF NATRIURETIC PEPTIDE: CPT

## 2019-06-23 PROCEDURE — 6360000002 HC RX W HCPCS: Performed by: EMERGENCY MEDICINE

## 2019-06-23 PROCEDURE — 87040 BLOOD CULTURE FOR BACTERIA: CPT

## 2019-06-23 PROCEDURE — 93005 ELECTROCARDIOGRAM TRACING: CPT | Performed by: EMERGENCY MEDICINE

## 2019-06-23 PROCEDURE — 83735 ASSAY OF MAGNESIUM: CPT

## 2019-06-23 PROCEDURE — 6370000000 HC RX 637 (ALT 250 FOR IP): Performed by: EMERGENCY MEDICINE

## 2019-06-23 PROCEDURE — 93005 ELECTROCARDIOGRAM TRACING: CPT

## 2019-06-23 PROCEDURE — 71045 X-RAY EXAM CHEST 1 VIEW: CPT

## 2019-06-23 PROCEDURE — 84484 ASSAY OF TROPONIN QUANT: CPT

## 2019-06-23 PROCEDURE — 96374 THER/PROPH/DIAG INJ IV PUSH: CPT

## 2019-06-23 PROCEDURE — 80053 COMPREHEN METABOLIC PANEL: CPT

## 2019-06-23 RX ORDER — LEVOFLOXACIN 750 MG/1
750 TABLET ORAL DAILY
Qty: 7 TABLET | Refills: 0 | Status: SHIPPED | OUTPATIENT
Start: 2019-06-23 | End: 2019-06-30

## 2019-06-23 RX ORDER — METHYLPREDNISOLONE SODIUM SUCCINATE 125 MG/2ML
125 INJECTION, POWDER, LYOPHILIZED, FOR SOLUTION INTRAMUSCULAR; INTRAVENOUS ONCE
Status: COMPLETED | OUTPATIENT
Start: 2019-06-23 | End: 2019-06-23

## 2019-06-23 RX ORDER — IPRATROPIUM BROMIDE AND ALBUTEROL SULFATE 2.5; .5 MG/3ML; MG/3ML
1 SOLUTION RESPIRATORY (INHALATION) ONCE
Status: COMPLETED | OUTPATIENT
Start: 2019-06-23 | End: 2019-06-23

## 2019-06-23 RX ORDER — GUAIFENESIN 600 MG/1
1200 TABLET, EXTENDED RELEASE ORAL 2 TIMES DAILY
Qty: 40 TABLET | Refills: 0 | Status: SHIPPED | OUTPATIENT
Start: 2019-06-23 | End: 2019-07-03

## 2019-06-23 RX ORDER — SODIUM CHLORIDE 9 MG/ML
INJECTION, SOLUTION INTRAVENOUS CONTINUOUS
Status: DISCONTINUED | OUTPATIENT
Start: 2019-06-23 | End: 2019-06-23 | Stop reason: HOSPADM

## 2019-06-23 RX ORDER — IPRATROPIUM BROMIDE AND ALBUTEROL SULFATE 2.5; .5 MG/3ML; MG/3ML
1 SOLUTION RESPIRATORY (INHALATION) EVERY 6 HOURS PRN
Qty: 360 ML | Refills: 0 | Status: SHIPPED | OUTPATIENT
Start: 2019-06-23

## 2019-06-23 RX ORDER — 0.9 % SODIUM CHLORIDE 0.9 %
1000 INTRAVENOUS SOLUTION INTRAVENOUS ONCE
Status: DISCONTINUED | OUTPATIENT
Start: 2019-06-23 | End: 2019-06-23

## 2019-06-23 RX ORDER — PREDNISONE 10 MG/1
60 TABLET ORAL DAILY
Qty: 30 TABLET | Refills: 0 | Status: SHIPPED | OUTPATIENT
Start: 2019-06-23 | End: 2019-06-28

## 2019-06-23 RX ADMIN — METHYLPREDNISOLONE SODIUM SUCCINATE 125 MG: 125 INJECTION, POWDER, FOR SOLUTION INTRAMUSCULAR; INTRAVENOUS at 10:25

## 2019-06-23 RX ADMIN — SODIUM CHLORIDE: 9 INJECTION, SOLUTION INTRAVENOUS at 10:28

## 2019-06-23 RX ADMIN — IPRATROPIUM BROMIDE AND ALBUTEROL SULFATE 1 AMPULE: .5; 3 SOLUTION RESPIRATORY (INHALATION) at 10:29

## 2019-06-23 ASSESSMENT — ENCOUNTER SYMPTOMS
DIARRHEA: 0
NAUSEA: 0
ABDOMINAL PAIN: 0
BACK PAIN: 0
WHEEZING: 0
PHOTOPHOBIA: 0
APNEA: 0
VOMITING: 0
SINUS PRESSURE: 0
ABDOMINAL DISTENTION: 0
RHINORRHEA: 0
EYE PAIN: 0
COLOR CHANGE: 0
SORE THROAT: 0
COUGH: 1
CONSTIPATION: 0
SHORTNESS OF BREATH: 1

## 2019-06-23 NOTE — ED PROVIDER NOTES
[address] [place] [address] ED  eMERGENCY dEPARTMENT eNCOUnter      Pt Name: Silva Russ  MRN: [id number]  [place] 1935  Date of evaluation: 6/23/2019  Provider: Jacinto Brooks MD    CHIEF COMPLAINT       Chief Complaint   Patient presents with    Chest Congestion    Shortness of Breath         HISTORY OF PRESENT ILLNESS   (Location/Symptom, Timing/Onset,Context/Setting, Quality, Duration, Modifying Factors, Severity)  Note limiting factors. Silva Russ is a 82 y.o. male who presents to the emergency department with complaint of cough, congestion and shortness of breath of 2 days duration. Patient has history of COPD, type 2 diabetes, chronic A. fib, congestive heart failure, hypertension, hyperlipidemia, sick sinus syndrome status post pacer and obesity. Patient denies chest pain, fever, chills, nausea, vomiting, diarrhea or constipation. Denies orthopnea or PND. Cough is nonproductive. HPI    Nursing Notes were reviewed. REVIEW OF SYSTEMS    (2-9 systems for level 4, 10 or more for level 5)     Review of Systems   Constitutional: Negative. Negative for activity change, appetite change, chills, fatigue and fever. HENT: Positive for congestion. Negative for ear discharge, ear pain, hearing loss, rhinorrhea, sinus pressure and sore throat. Eyes: Negative for photophobia, pain and visual disturbance. Respiratory: Positive for cough and shortness of breath. Negative for apnea and wheezing. Cardiovascular: Negative for chest pain, palpitations and leg swelling. Gastrointestinal: Negative for abdominal distention, abdominal pain, constipation, diarrhea, nausea and vomiting. Endocrine: Negative for cold intolerance, heat intolerance and polyuria. Genitourinary: Negative for dysuria, flank pain, frequency and urgency. Musculoskeletal: Negative for arthralgias, back pain, gait problem, myalgias and neck stiffness. Skin: Negative for color change, pallor and rash. Allergic/Immunologic: Negative for food allergies and immunocompromised state. Neurological: Negative for dizziness, tremors, syncope, weakness, light-headedness and headaches. Psychiatric/Behavioral: Negative for agitation, confusion and hallucinations. All other systems reviewed and are negative. Except as noted above the remainder of the review of systems was reviewed and negative. PAST MEDICAL HISTORY     Past Medical History:   Diagnosis Date    [facility]     Carcinoma of ascending colon ([facility] 2014    CHF (congestive heart failure) (HCC)     Chronic [facility] fibrillation [facility] 3/3/2016    Dementia     Foot ulcer[facility] left [facility] 3/3/2016    Hyperlipidemia     Hypertension     Hypothyroidism     Type II or unspecified type diabetes mellitus without mention of complication, not stated as uncontrolled     diet         SURGICAL HISTORY       Past Surgical History:   Procedure Laterality Date    COLONOSCOPY  5/9/14    bx     OTHER SURGICAL HISTORY  7/29/14    Right colectomy with primary anastomosis and liver bx    RIGHT COLECTOMY  07/29/14    ANDRASKO    UPPER GASTROINTESTINAL ENDOSCOPY  5/9/14    bx ike         CURRENT MEDICATIONS       Previous Medications    ASPIRIN 81 MG TABLET    Take 81 mg by mouth daily. FENOFIBRATE 160 MG TABLET    Take 1 tablet by mouth daily Note to pharmacy: Please dispense generic fenofibrate unless prescriber denotes ELDA    FERROUS SULFATE 325 (65 FE) MG TABLET    Take 1 tablet by mouth 2 times daily    FUROSEMIDE (LASIX) 40 MG TABLET    TAKE 1 TABLET BY MOUTH DAILY    HOSPITAL BED MISC    by Does not apply route    INCONTINENCE SUPPLY DISPOSABLE (ATTENDS BARIATRIC BRIEF XXL) MISC    Use around the clock as needed    LEVOTHYROXINE (SYNTHROID) 112 MCG TABLET    TAKE 1 TABLET BY MOUTH DAILY.     LIFT CHAIR MISC    by Does not apply route    LOVASTATIN (MEVACOR) 20 MG TABLET    TAKE 1 TABLET BY MOUTH NIGHTLY    METOPROLOL TARTRATE (LOPRESSOR) 25 MG TABLET    Take 0.5 tablets by mouth 2 times daily    MISC. DEVICES MISC    One lift chair, to be used daily Diagnosis CHF, obesity    NISOLDIPINE (SULAR) 8.5 MG EXTENDED RELEASE TABLET    TAKE 1 TABLET BY MOUTH EVERY DAY    OMEPRAZOLE (PRILOSEC) 20 MG DELAYED RELEASE CAPSULE    Take 1 capsule by mouth daily    POTASSIUM CHLORIDE (KLOR-CON M) 20 MEQ EXTENDED RELEASE TABLET    Take 3 tablets by mouth daily    SILVERSEAL HYDROGEL DRESSING 4\"X5\" PADS    Apply 1 Units topically daily    XARELTO 20 MG TABS TABLET    TAKE 1 TABLET BY MOUTH DAILY WITH BREAKFAST (Start on 1/31/19)       ALLERGIES     Patient has no known allergies. FAMILY HISTORY       Family History   Problem Relation Age of Onset    Heart Attack Mother     Other Father         Unknown    Cancer Sister         Cervical          SOCIAL HISTORY       Social History     Socioeconomic History    Marital status:       Spouse name: None    Number of children: None    Years of education: None    Highest education level: None   Occupational History    None   Social Needs    Financial resource strain: None    Food insecurity:     Worry: None     Inability: None    Transportation needs:     Medical: None     Non-medical: None   Tobacco Use    Smoking status: Never Smoker    Smokeless tobacco: Never Used   Substance and Sexual Activity    Alcohol use: No    Drug use: No    Sexual activity: Never   Lifestyle    Physical activity:     Days per week: None     Minutes per session: None    Stress: None   Relationships    Social connections:     Talks on phone: None     Gets together: None     Attends Alevism service: None     Active member of club or organization: None     Attends meetings of clubs or organizations: None     Relationship status: None    Intimate partner violence:     Fear of current or ex partner: None     Emotionally abused: None     Physically abused: None     Forced sexual activity: None   Other Topics given option of in-hospital observation and continuing care , but he and his daughter opted to be discharged and to follow-up with family doctor within 3 days . They are agreeable to plan of care. All of their questions were addressed to their satisfaction. Vitals:    Vitals:    06/23/19 1006 06/23/19 1008 06/23/19 1052   BP:  (!) 167/77 (!) 144/65   Pulse: 85  67   Resp: 28  20   Temp:  97.6 °F (36.4 °C)    TempSrc:  Oral    SpO2: 98%  99%   Weight: 280 lb (127 kg)     Height: 6' (1.829 m)             MDM  Number of Diagnoses or Management Options     Amount and/or Complexity of Data Reviewed  Clinical lab tests: reviewed and ordered  Tests in the radiology section of CPT®: reviewed and ordered  Tests in the medicine section of CPT®: ordered and reviewed    Risk of Complications, Morbidity, and/or Mortality  Presenting problems: moderate  Diagnostic procedures: moderate  Management options: moderate    Patient Progress  Patient progress: improved      CRITICAL CARE TIME   Total Critical Care time was  minutes, excluding separately reportable procedures. There was a high probability of clinically significant/life threatening deterioration in the patient's condition which required my urgentintervention. CONSULTS:  None    PROCEDURES:  Unless otherwise noted below, none     Procedures    FINAL IMPRESSION      1. Dyspnea and respiratory abnormalities    2.  Bronchitis          DISPOSITION/PLAN   DISPOSITION        PATIENT REFERRED TO:  Pastora Cole MD  [facility] [phone]    In 3 days      Pastora Cole MD  [address] [facility] [place] [address]  [phone]    In 3 days        DISCHARGE MEDICATIONS:  New Prescriptions    GUAIFENESIN (MUCINEX) 600 MG EXTENDED RELEASE TABLET    Take 2 tablets by mouth 2 times daily for 10 days    IPRATROPIUM-ALBUTEROL (DUONEB) 0.5-2.5 (3) MG/3ML SOLN NEBULIZER SOLUTION    Inhale 3 mLs into the lungs every 6 hours as needed for Shortness of Breath    LEVOFLOXACIN (LEVAQUIN) 750 MG TABLET    Take 1 tablet by mouth daily for 7 days    PREDNISONE (DELTASONE) 10 MG TABLET    Take 6 tablets by mouth daily for 5 doses    RESPIRATORY THERAPY SUPPLIES (NEBULIZER COMPRESSOR) KIT    1 kit by Does not apply route once for 1 dose          (Please note that portions of this note were completed with a voice recognitionprogram.  Efforts were made to edit the dictations but occasionally words are mis-transcribed.)    Kenny Nj MD (electronically signed)  Attending Emergency Physician          Kenny Nj MD  06/23/19 7722

## 2019-06-23 NOTE — ED TRIAGE NOTES
Patient brought in by daughter for complaints of cough, congestion, SOB x2 days per her report. Patient talkative, [place], labored breathing.

## 2019-06-23 NOTE — ED NOTES
Nursing Adult Assessment    General Appearance  [x] Facial Expressions, extremities, & body posture are relaxed. [] Exceptions:    Cognitive  [x] Alert, make eye contact when prompted. [x] Oriented to person, place, & situation. [] Exceptions:    Respiratory  [] Unlabored breathing   [x] Speaks in clear and complete sentences   [x] Chest expansion is symmetrical with breaths   [] Breath sounds are clear bilaterally. [x] Exceptions:diminished, labored with exertion    Cardiovascular  [] Regular apical heart sounds   [x] Peripheral pulses are palpable   [x] Capillary refill < 3 seconds in all extremities. [x] Exceptions: Pacer    Abdomen  [x] Non-tender   [x] Non-distended   [x] Bowel sounds are present. [x] Exceptions: hernia    Skin  [x] Color appropriate for ethnicity   [x] No rash or discoloration present at the area(s) of complaint   [x] Warm and dry to touch. [] Exceptions:    Extremities  [x] Non-tender   [x] Normal range of motion   [x] Normal sensation   [] Normal Appearance, No Edema.   [x] Exceptions: BLE trace edema     Akosua Herrera RN  06/23/19 [address] [facility] [address]  06/23/19 0084

## 2019-06-24 ENCOUNTER — HOSPITAL ENCOUNTER (OUTPATIENT)
Dept: CARDIOLOGY | Age: 84
Discharge: HOME | End: 2019-06-24
Payer: MEDICARE

## 2019-06-24 PROCEDURE — 93296 REM INTERROG EVL PM/IDS: CPT

## 2019-06-29 LAB
BLOOD CULTURE, ROUTINE: NORMAL
CULTURE, BLOOD 2: NORMAL

## 2019-07-07 ENCOUNTER — HOSPITAL ENCOUNTER (INPATIENT)
Age: 84
LOS: 3 days | Discharge: HOME | DRG: 603 | End: 2019-07-10
Attending: FAMILY MEDICINE
Payer: MEDICARE

## 2019-07-07 ENCOUNTER — APPOINTMENT (OUTPATIENT)
Dept: GENERAL RADIOLOGY | Age: 84
End: 2019-07-07
Payer: MEDICARE

## 2019-07-07 ENCOUNTER — HOSPITAL ENCOUNTER (EMERGENCY)
Age: 84
Discharge: ANOTHER HOSPITAL | End: 2019-07-07
Attending: INTERNAL MEDICINE
Payer: MEDICARE

## 2019-07-07 VITALS
TEMPERATURE: 97.7 F | RESPIRATION RATE: 18 BRPM | OXYGEN SATURATION: 96 % | BODY MASS INDEX: 35.94 KG/M2 | WEIGHT: 280 LBS | SYSTOLIC BLOOD PRESSURE: 143 MMHG | DIASTOLIC BLOOD PRESSURE: 63 MMHG | HEIGHT: 74 IN | HEART RATE: 63 BPM

## 2019-07-07 DIAGNOSIS — R73.9 HYPERGLYCEMIA: ICD-10-CM

## 2019-07-07 DIAGNOSIS — Z79.01 ANTICOAGULATION ADEQUATE: ICD-10-CM

## 2019-07-07 DIAGNOSIS — B35.1 ONYCHOMYCOSIS: ICD-10-CM

## 2019-07-07 DIAGNOSIS — I10 ESSENTIAL HYPERTENSION: ICD-10-CM

## 2019-07-07 DIAGNOSIS — I73.9 PERIPHERAL VASCULAR DISEASE OF LOWER EXTREMITY (HCC): ICD-10-CM

## 2019-07-07 DIAGNOSIS — S81.811A NONINFECTED SKIN TEAR OF LOWER EXTREMITY, RIGHT, INITIAL ENCOUNTER: ICD-10-CM

## 2019-07-07 DIAGNOSIS — L03.115 CELLULITIS OF RIGHT LOWER EXTREMITY: ICD-10-CM

## 2019-07-07 DIAGNOSIS — M79.89 LEG SWELLING: Primary | ICD-10-CM

## 2019-07-07 LAB
ALBUMIN SERPL-MCNC: 3.6 G/DL (ref 3.5–4.6)
ALP BLD-CCNC: 108 U/L (ref 35–104)
ALT SERPL-CCNC: 19 U/L (ref 0–41)
ANION GAP SERPL CALCULATED.3IONS-SCNC: 14 MEQ/L (ref 9–15)
AST SERPL-CCNC: 42 U/L (ref 0–40)
BASOPHILS ABSOLUTE: 0 K/UL (ref 0–0.2)
BASOPHILS RELATIVE PERCENT: 0.3 %
BILIRUB SERPL-MCNC: 1.4 MG/DL (ref 0.2–0.7)
BUN BLDV-MCNC: 24 MG/DL (ref 8–23)
C-REACTIVE PROTEIN: 11.4 MG/L (ref 0–5)
CALCIUM SERPL-MCNC: 8.9 MG/DL (ref 8.5–9.9)
CHLORIDE BLD-SCNC: 102 MEQ/L (ref 95–107)
CHP ED QC CHECK: YES
CO2: 24 MEQ/L (ref 20–31)
CREAT SERPL-MCNC: 0.88 MG/DL (ref 0.7–1.2)
EOSINOPHILS ABSOLUTE: 0.2 K/UL (ref 0–0.7)
EOSINOPHILS RELATIVE PERCENT: 1.5 %
GFR AFRICAN AMERICAN: >60
GFR NON-AFRICAN AMERICAN: >60
GLOBULIN: 3.3 G/DL (ref 2.3–3.5)
GLUCOSE BLD-MCNC: 140 MG/DL (ref 70–99)
GLUCOSE BLD-MCNC: 148 MG/DL
GLUCOSE BLD-MCNC: 148 MG/DL (ref 60–115)
HCT VFR BLD CALC: 44.2 % (ref 42–52)
HEMOGLOBIN: 14.5 G/DL (ref 14–18)
LYMPHOCYTES ABSOLUTE: 1 K/UL (ref 1–4.8)
LYMPHOCYTES RELATIVE PERCENT: 8.9 %
MCH RBC QN AUTO: 31.8 PG (ref 27–31.3)
MCHC RBC AUTO-ENTMCNC: 32.8 % (ref 33–37)
MCV RBC AUTO: 96.9 FL (ref 80–100)
MONOCYTES ABSOLUTE: 0.5 K/UL (ref 0.2–0.8)
MONOCYTES RELATIVE PERCENT: 4.6 %
NEUTROPHILS ABSOLUTE: 9.9 K/UL (ref 1.4–6.5)
NEUTROPHILS RELATIVE PERCENT: 84.7 %
PDW BLD-RTO: 15.6 % (ref 11.5–14.5)
PERFORMED ON: ABNORMAL
PLATELET # BLD: 182 K/UL (ref 130–400)
POTASSIUM SERPL-SCNC: 3.9 MEQ/L (ref 3.4–4.9)
RBC # BLD: 4.56 M/UL (ref 4.7–6.1)
SEDIMENTATION RATE, ERYTHROCYTE: 10 MM (ref 0–20)
SODIUM BLD-SCNC: 140 MEQ/L (ref 135–144)
TOTAL PROTEIN: 6.9 G/DL (ref 6.3–8)
WBC # BLD: 11.7 K/UL (ref 4.8–10.8)

## 2019-07-07 PROCEDURE — 85652 RBC SED RATE AUTOMATED: CPT

## 2019-07-07 PROCEDURE — 36415 COLL VENOUS BLD VENIPUNCTURE: CPT

## 2019-07-07 PROCEDURE — 6360000002 HC RX W HCPCS: Performed by: INTERNAL MEDICINE

## 2019-07-07 PROCEDURE — 87040 BLOOD CULTURE FOR BACTERIA: CPT

## 2019-07-07 PROCEDURE — 2580000003 HC RX 258: Performed by: INTERNAL MEDICINE

## 2019-07-07 PROCEDURE — 99283 EMERGENCY DEPT VISIT LOW MDM: CPT

## 2019-07-07 PROCEDURE — 1210000000 HC MED SURG R&B

## 2019-07-07 PROCEDURE — 73590 X-RAY EXAM OF LOWER LEG: CPT

## 2019-07-07 PROCEDURE — 86140 C-REACTIVE PROTEIN: CPT

## 2019-07-07 PROCEDURE — 85025 COMPLETE CBC W/AUTO DIFF WBC: CPT

## 2019-07-07 PROCEDURE — 80053 COMPREHEN METABOLIC PANEL: CPT

## 2019-07-07 PROCEDURE — 96365 THER/PROPH/DIAG IV INF INIT: CPT

## 2019-07-07 RX ORDER — SODIUM CHLORIDE 0.9 % (FLUSH) 0.9 %
3 SYRINGE (ML) INJECTION EVERY 8 HOURS
Status: DISCONTINUED | OUTPATIENT
Start: 2019-07-07 | End: 2019-07-07 | Stop reason: HOSPADM

## 2019-07-07 RX ORDER — DEXTROSE MONOHYDRATE 25 G/50ML
12.5 INJECTION, SOLUTION INTRAVENOUS PRN
Status: DISCONTINUED | OUTPATIENT
Start: 2019-07-07 | End: 2019-07-10 | Stop reason: HOSPADM

## 2019-07-07 RX ORDER — SODIUM CHLORIDE 0.9 % (FLUSH) 0.9 %
10 SYRINGE (ML) INJECTION PRN
Status: DISCONTINUED | OUTPATIENT
Start: 2019-07-07 | End: 2019-07-10 | Stop reason: HOSPADM

## 2019-07-07 RX ORDER — NICOTINE POLACRILEX 4 MG
15 LOZENGE BUCCAL PRN
Status: DISCONTINUED | OUTPATIENT
Start: 2019-07-07 | End: 2019-07-10 | Stop reason: HOSPADM

## 2019-07-07 RX ORDER — ONDANSETRON 2 MG/ML
4 INJECTION INTRAMUSCULAR; INTRAVENOUS EVERY 6 HOURS PRN
Status: DISCONTINUED | OUTPATIENT
Start: 2019-07-07 | End: 2019-07-10 | Stop reason: HOSPADM

## 2019-07-07 RX ORDER — DEXTROSE MONOHYDRATE 50 MG/ML
100 INJECTION, SOLUTION INTRAVENOUS PRN
Status: DISCONTINUED | OUTPATIENT
Start: 2019-07-07 | End: 2019-07-10 | Stop reason: HOSPADM

## 2019-07-07 RX ORDER — SODIUM CHLORIDE 0.9 % (FLUSH) 0.9 %
10 SYRINGE (ML) INJECTION EVERY 12 HOURS SCHEDULED
Status: DISCONTINUED | OUTPATIENT
Start: 2019-07-07 | End: 2019-07-10 | Stop reason: HOSPADM

## 2019-07-07 RX ADMIN — Medication 3 ML: at 17:56

## 2019-07-07 RX ADMIN — VANCOMYCIN HYDROCHLORIDE 1000 MG: 1 INJECTION, POWDER, LYOPHILIZED, FOR SOLUTION INTRAVENOUS at 17:55

## 2019-07-07 ASSESSMENT — PAIN SCALES - GENERAL
PAINLEVEL_OUTOF10: 0
PAINLEVEL_OUTOF10: 5

## 2019-07-07 ASSESSMENT — PAIN DESCRIPTION - LOCATION: LOCATION: LEG

## 2019-07-07 ASSESSMENT — PAIN DESCRIPTION - ORIENTATION: ORIENTATION: RIGHT

## 2019-07-07 ASSESSMENT — PAIN DESCRIPTION - DESCRIPTORS: DESCRIPTORS: ACHING

## 2019-07-07 NOTE — ED NOTES
Ivy from the [facility] called with transport ETA: 5-10 minutes.      Brittny Arredondo  07/07/19 182

## 2019-07-07 NOTE — ED NOTES
Report called to [address] given to [facility] DR MEGAN HERNANDEZ. Patient to be transferred via [address]. Daughter took belongings. No signs or symptoms of pain or distress noted. Call put out to daughter to inform of transfer to [facility] voicemail not set up and no answer at alternate number.      Anca Keys RN  07/07/19 [address], RN  07/07/19 0116

## 2019-07-07 NOTE — ED TRIAGE NOTES
Patient came in for lower extremity edema. Daughter states patient's antiembolism stocking rolled down 2 days ago and he didn't tell anyone until last night. Redness, skin tear, open bleeding/weeping wound noted to right calf.

## 2019-07-07 NOTE — ED NOTES
Called the [facility], spoke with Ulysses Laundry, to have the ED [facility] hospitalist paged for Dr. Norris Casiano.      Eloisa Kramer  07/07/19 8132

## 2019-07-07 NOTE — ED NOTES
Christine from the [facility] called back with Dr. Elizabeth Mcintosh for Dr. Jared Hunt.      Jones Nunn  07/07/19 8665

## 2019-07-07 NOTE — ED NOTES
Ivy from the [facility] called with a room assignment: 135-0, report #[phone]. They will call back with transport ETA.      Meliton Tesfaye  07/07/19 0480

## 2019-07-07 NOTE — H&P
Hospital Medicine History & Physical      PCP: Natalie Appiah MD    Date of Admission: 7/7/2019    Date of Service: 7/7/19      Chief Complaint:  Leg wound      History Of Present Illness:  82 y.o. male who presented to [facility] as a direct admit from [facility] ED for the treatment of a right calf wound and swelling. The patient rolled his compression stocking down 2 days and was constricting his right calf. The patient's daughter noticed that he had developed a wound and brought him to the ED. The patient has an erythematous and edematous right calf. He has a hx of dementia. He denies cp sob ha rash anx n v d f    Past Medical History:          Diagnosis Date    Atrial fibrillation ([address]     Carcin[facility] of ascending colon ([facility] 2014    CHF (congestive heart failure) (HCC)     Chronic atrial fibrillation ([facility] 3/3/2016    Dementia     Foot ulcer, left ([facility] 3/3/2016    Hyperlipidemia     Hypertension     Hypothyroidism     Type II or unspecified type diabetes mellitus without mention of complication, not stated as uncontrolled     diet       Past Surgical History:          Procedure Laterality Date    COLONOSCOPY  5/9/14    bx     OTHER SURGICAL HISTORY  7/29/14    Right colectomy with primary anastomosis and liver bx    [facility]    UPPER GASTROINTESTINAL ENDOSCOPY  5/9/14    bx [email or web address]       Medications Prior to Admission:      Prior to Admission medications    Medication Sig Start Date End Date Taking?  Authorizing Provider   Respiratory Therapy Supplies (NEBULIZER COMPRESSOR) KIT 1 kit by Does not apply route once for 1 dose 6/23/19 6/23/19  Emeka Kothari MD   ipratropium-albuterol (DUONEB) 0.5-2.5 (3) MG/3ML SOLN nebulizer solution Inhale 3 mLs into the lungs every 6 hours as needed for Shortness of Breath 6/23/19   Emeka Kothari MD   furosemide (LASIX) 40 MG tablet TAKE 1 TABLET BY MOUTH DAILY 5/13/19   Natalie Appiah MD   lovastatin (MEVACOR) 20 Other Father         Unknown    Cancer Sister         Cervical       REVIEW OF SYSTEMS:   Pertinent positives as noted in the HPI. All other systems reviewed and negative. PHYSICAL EXAM:    There were no vitals taken for this visit. General appearance:  No apparent distress, appears stated age and cooperative. HEENT:  Normal cephalic, atraumatic without obvious deformity. Pupils equal, round, and reactive to light. Extra ocular muscles intact. Conjunctivae/corneas clear. Neck: Supple, with full range of motion. No jugular venous distention. Trachea midline. Respiratory:  Normal respiratory effort. Clear to auscultation, bilaterally without Rales/Wheezes/Rhonchi. Cardiovascular:  Regular rate and rhythm with normal S1/S2 without murmurs, rubs or gallops. Abdomen: Soft, non-tender, non-distended with normal bowel sounds. Musculoskeletal:  Right left wound with surrounding erythema and edema. Full range of motion without deformity. Skin: Skin color, texture, turgor normal.  No rashes or lesions. Neurologic:  Neurovascularly intact without any focal sensory/motor deficits. Cranial nerves: II-XII intact, grossly non-focal.  Psychiatric:  Alert and oriented, thought content appropriate, normal insight  Capillary Refill: Brisk,< 3 seconds   Peripheral Pulses: +2 palpable, equal bilaterally       Labs:     Recent Labs     07/07/19  1646   WBC 11.7*   HGB 14.5   HCT 44.2        Recent Labs     07/07/19  1642      K 3.9      CO2 24   BUN 24*   CREATININE 0.88   CALCIUM 8.9     Recent Labs     07/07/19  1642   AST 42*   ALT 19   BILITOT 1.4*   ALKPHOS 108*     No results for input(s): INR in the last 72 hours. No results for input(s): [place] in the last 72 hours.     Urinalysis:      Lab Results   Component Value Date    NITRU Negative 10/15/2018    WBCUA 0-2 04/18/2014    BACTERIA 1+ 07/21/2012    RBCUA 0-2 04/18/2014    BLOODU Negative 10/15/2018    SPECGRAV 1.025 10/15/2018

## 2019-07-08 ENCOUNTER — APPOINTMENT (OUTPATIENT)
Dept: ULTRASOUND IMAGING | Age: 84
DRG: 603 | End: 2019-07-08
Attending: FAMILY MEDICINE
Payer: MEDICARE

## 2019-07-08 LAB
ANION GAP SERPL CALCULATED.3IONS-SCNC: 13 MEQ/L (ref 9–15)
BASOPHILS ABSOLUTE: 0 K/UL (ref 0–0.2)
BASOPHILS RELATIVE PERCENT: 0.5 %
BUN BLDV-MCNC: 22 MG/DL (ref 8–23)
CALCIUM SERPL-MCNC: 7.9 MG/DL (ref 8.5–9.9)
CHLORIDE BLD-SCNC: 105 MEQ/L (ref 95–107)
CO2: 21 MEQ/L (ref 20–31)
CREAT SERPL-MCNC: 0.77 MG/DL (ref 0.7–1.2)
EOSINOPHILS ABSOLUTE: 0.1 K/UL (ref 0–0.7)
EOSINOPHILS RELATIVE PERCENT: 1.6 %
GFR AFRICAN AMERICAN: >60
GFR NON-AFRICAN AMERICAN: >60
GLUCOSE BLD-MCNC: 100 MG/DL (ref 60–115)
GLUCOSE BLD-MCNC: 117 MG/DL (ref 60–115)
GLUCOSE BLD-MCNC: 79 MG/DL (ref 60–115)
GLUCOSE BLD-MCNC: 91 MG/DL (ref 70–99)
GLUCOSE BLD-MCNC: 92 MG/DL (ref 60–115)
GLUCOSE BLD-MCNC: 93 MG/DL (ref 60–115)
HCT VFR BLD CALC: 38.5 % (ref 42–52)
HEMOGLOBIN: 13.2 G/DL (ref 14–18)
LACTIC ACID: 1.5 MMOL/L (ref 0.5–2.2)
LYMPHOCYTES ABSOLUTE: 0.7 K/UL (ref 1–4.8)
LYMPHOCYTES RELATIVE PERCENT: 8.4 %
MAGNESIUM: 2 MG/DL (ref 1.7–2.4)
MCH RBC QN AUTO: 33.1 PG (ref 27–31.3)
MCHC RBC AUTO-ENTMCNC: 34.4 % (ref 33–37)
MCV RBC AUTO: 96.2 FL (ref 80–100)
MONOCYTES ABSOLUTE: 0.5 K/UL (ref 0.2–0.8)
MONOCYTES RELATIVE PERCENT: 6 %
NEUTROPHILS ABSOLUTE: 6.9 K/UL (ref 1.4–6.5)
NEUTROPHILS RELATIVE PERCENT: 83.5 %
PDW BLD-RTO: 15.2 % (ref 11.5–14.5)
PERFORMED ON: ABNORMAL
PERFORMED ON: NORMAL
PLATELET # BLD: 132 K/UL (ref 130–400)
POTASSIUM REFLEX MAGNESIUM: 3.5 MEQ/L (ref 3.4–4.9)
RBC # BLD: 4 M/UL (ref 4.7–6.1)
SODIUM BLD-SCNC: 139 MEQ/L (ref 135–144)
WBC # BLD: 8.3 K/UL (ref 4.8–10.8)

## 2019-07-08 PROCEDURE — 87186 SC STD MICRODIL/AGAR DIL: CPT

## 2019-07-08 PROCEDURE — 94664 DEMO&/EVAL PT USE INHALER: CPT

## 2019-07-08 PROCEDURE — 83605 ASSAY OF LACTIC ACID: CPT

## 2019-07-08 PROCEDURE — 6360000002 HC RX W HCPCS: Performed by: INTERNAL MEDICINE

## 2019-07-08 PROCEDURE — 80048 BASIC METABOLIC PNL TOTAL CA: CPT

## 2019-07-08 PROCEDURE — 6370000000 HC RX 637 (ALT 250 FOR IP): Performed by: INTERNAL MEDICINE

## 2019-07-08 PROCEDURE — 97166 OT EVAL MOD COMPLEX 45 MIN: CPT

## 2019-07-08 PROCEDURE — 1210000000 HC MED SURG R&B

## 2019-07-08 PROCEDURE — 2580000003 HC RX 258: Performed by: INTERNAL MEDICINE

## 2019-07-08 PROCEDURE — 93971 EXTREMITY STUDY: CPT

## 2019-07-08 PROCEDURE — 87205 SMEAR GRAM STAIN: CPT

## 2019-07-08 PROCEDURE — 6360000002 HC RX W HCPCS: Performed by: PHYSICIAN ASSISTANT

## 2019-07-08 PROCEDURE — 99222 1ST HOSP IP/OBS MODERATE 55: CPT | Performed by: INTERNAL MEDICINE

## 2019-07-08 PROCEDURE — 2580000003 HC RX 258: Performed by: PHYSICIAN ASSISTANT

## 2019-07-08 PROCEDURE — 87070 CULTURE OTHR SPECIMN AEROBIC: CPT

## 2019-07-08 PROCEDURE — 87077 CULTURE AEROBIC IDENTIFY: CPT

## 2019-07-08 PROCEDURE — 85025 COMPLETE CBC W/AUTO DIFF WBC: CPT

## 2019-07-08 PROCEDURE — 83735 ASSAY OF MAGNESIUM: CPT

## 2019-07-08 PROCEDURE — 36415 COLL VENOUS BLD VENIPUNCTURE: CPT

## 2019-07-08 RX ORDER — FUROSEMIDE 40 MG/1
40 TABLET ORAL DAILY
Status: DISCONTINUED | OUTPATIENT
Start: 2019-07-08 | End: 2019-07-10 | Stop reason: HOSPADM

## 2019-07-08 RX ORDER — ASPIRIN 81 MG/1
81 TABLET ORAL DAILY
Status: DISCONTINUED | OUTPATIENT
Start: 2019-07-08 | End: 2019-07-10 | Stop reason: HOSPADM

## 2019-07-08 RX ORDER — LEVOTHYROXINE SODIUM 112 UG/1
112 TABLET ORAL DAILY
Status: DISCONTINUED | OUTPATIENT
Start: 2019-07-08 | End: 2019-07-10 | Stop reason: HOSPADM

## 2019-07-08 RX ORDER — FENOFIBRATE 160 MG/1
160 TABLET ORAL DAILY
Status: DISCONTINUED | OUTPATIENT
Start: 2019-07-08 | End: 2019-07-10 | Stop reason: HOSPADM

## 2019-07-08 RX ORDER — AMLODIPINE BESYLATE 2.5 MG/1
2.5 TABLET ORAL DAILY
Status: DISCONTINUED | OUTPATIENT
Start: 2019-07-08 | End: 2019-07-10 | Stop reason: HOSPADM

## 2019-07-08 RX ORDER — NISOLDIPINE 8.5 MG/1
1 TABLET, FILM COATED, EXTENDED RELEASE ORAL DAILY
Status: DISCONTINUED | OUTPATIENT
Start: 2019-07-08 | End: 2019-07-08 | Stop reason: CLARIF

## 2019-07-08 RX ORDER — LOVASTATIN 20 MG/1
20 TABLET ORAL NIGHTLY
Status: DISCONTINUED | OUTPATIENT
Start: 2019-07-08 | End: 2019-07-10 | Stop reason: HOSPADM

## 2019-07-08 RX ORDER — PANTOPRAZOLE SODIUM 40 MG/1
40 TABLET, DELAYED RELEASE ORAL
Status: DISCONTINUED | OUTPATIENT
Start: 2019-07-08 | End: 2019-07-10 | Stop reason: HOSPADM

## 2019-07-08 RX ORDER — FERROUS SULFATE 325(65) MG
325 TABLET ORAL 2 TIMES DAILY
Status: DISCONTINUED | OUTPATIENT
Start: 2019-07-08 | End: 2019-07-10 | Stop reason: HOSPADM

## 2019-07-08 RX ORDER — IPRATROPIUM BROMIDE AND ALBUTEROL SULFATE 2.5; .5 MG/3ML; MG/3ML
1 SOLUTION RESPIRATORY (INHALATION) EVERY 6 HOURS PRN
Status: DISCONTINUED | OUTPATIENT
Start: 2019-07-08 | End: 2019-07-10 | Stop reason: HOSPADM

## 2019-07-08 RX ORDER — OMEPRAZOLE 20 MG/1
40 CAPSULE, DELAYED RELEASE ORAL EVERY MORNING
Status: DISCONTINUED | OUTPATIENT
Start: 2019-07-08 | End: 2019-07-08 | Stop reason: CLARIF

## 2019-07-08 RX ORDER — POTASSIUM CHLORIDE 20 MEQ/1
60 TABLET, EXTENDED RELEASE ORAL DAILY
Status: DISCONTINUED | OUTPATIENT
Start: 2019-07-08 | End: 2019-07-10 | Stop reason: HOSPADM

## 2019-07-08 RX ADMIN — LEVOTHYROXINE SODIUM 112 MCG: 112 TABLET ORAL at 11:55

## 2019-07-08 RX ADMIN — FERROUS SULFATE TAB 325 MG (65 MG ELEMENTAL FE) 325 MG: 325 (65 FE) TAB at 20:44

## 2019-07-08 RX ADMIN — RIVAROXABAN 20 MG: 20 TABLET, FILM COATED ORAL at 18:09

## 2019-07-08 RX ADMIN — METOPROLOL TARTRATE 12.5 MG: 25 TABLET ORAL at 20:44

## 2019-07-08 RX ADMIN — FENOFIBRATE 160 MG: 160 TABLET ORAL at 11:55

## 2019-07-08 RX ADMIN — ASPIRIN 81 MG: 81 TABLET ORAL at 11:55

## 2019-07-08 RX ADMIN — Medication 10 ML: at 07:57

## 2019-07-08 RX ADMIN — DEXTROSE MONOHYDRATE 1 G: 5 INJECTION INTRAVENOUS at 15:38

## 2019-07-08 RX ADMIN — AMLODIPINE BESYLATE 2.5 MG: 2.5 TABLET ORAL at 11:55

## 2019-07-08 RX ADMIN — VANCOMYCIN 1750 MG: 1 INJECTION, SOLUTION INTRAVENOUS at 12:02

## 2019-07-08 RX ADMIN — METOPROLOL TARTRATE 12.5 MG: 25 TABLET ORAL at 11:56

## 2019-07-08 RX ADMIN — ENOXAPARIN SODIUM 40 MG: 40 INJECTION SUBCUTANEOUS at 07:57

## 2019-07-08 RX ADMIN — PANTOPRAZOLE SODIUM 40 MG: 40 TABLET, DELAYED RELEASE ORAL at 11:55

## 2019-07-08 RX ADMIN — LOVASTATIN 20 MG: 20 TABLET ORAL at 20:44

## 2019-07-08 RX ADMIN — FUROSEMIDE 40 MG: 40 TABLET ORAL at 11:55

## 2019-07-08 RX ADMIN — Medication 10 ML: at 20:44

## 2019-07-08 RX ADMIN — POTASSIUM CHLORIDE 60 MEQ: 20 TABLET, EXTENDED RELEASE ORAL at 11:55

## 2019-07-08 RX ADMIN — FERROUS SULFATE TAB 325 MG (65 MG ELEMENTAL FE) 325 MG: 325 (65 FE) TAB at 11:55

## 2019-07-08 ASSESSMENT — PAIN SCALES - GENERAL
PAINLEVEL_OUTOF10: 0
PAINLEVEL_OUTOF10: 0

## 2019-07-08 NOTE — CONSULTS
kg)   SpO2 98%   BMI 34.50 kg/m²   Patient is alert and oriented x 3 in NAD. Vascular:  Palpable Dorsalis Pedis and Palpable Posterior Tibial Pulses B/L   Capillary Fill time < 3 seconds to B/L digits  Skin temperature warm to warm tibial tuberosity to the digits B/L  Hair growth absent to digits  +2 pitting edema  Chronic discoloration and skin texture changes b/l LE, R > L    Neurological:   Epicritic sensation intact B/L  Protective sensation via monofilament testing impaired B/L    Musculoskeletal/Orthopaedic:   Structural Deformities: decreased medial longitudinal arch   5/5 muscle strength Dorsiflexion, Plantarflexion, Inversion, Eversion B/L  ROM decreased pedal and ankle joints B/L. Minimal pain to palpation of right leg    Dermatological:   Skin appears dehydrated, scaling with chronic color and texture changes  No hyperkeratosis noted. Interspaces 1-4 are clear and without debris B/L. Nails 1-5 appear thickened and dystrophic. Open lesions present to posterior right leg as described below. Ulceration #1:   Location: posterior right leg  Measurements: 5 cm x 3 cm x superficial cm  Base: Granular/Healthy  Borders: intact   Exudate: moderate serosanguinous   Comments: very superficial ulcer with red granular base. No purulence, no malodor. Some surrounding erythema locally, no proximal streaking. No deep involvement.      LABS:   Lab Results   Component Value Date    WBC 8.3 07/08/2019    HGB 13.2 (L) 07/08/2019    HCT 38.5 (L) 07/08/2019    MCV 96.2 07/08/2019     07/08/2019     Lab Results   Component Value Date     07/08/2019    K 3.5 07/08/2019     07/08/2019    CO2 21 07/08/2019    BUN 22 07/08/2019    CREATININE 0.77 07/08/2019    GLUCOSE 91 07/08/2019    GLUCOSE 90 01/31/2012    CALCIUM 7.9 07/08/2019      Lab Results   Component Value Date    LABALBU 3.6 07/07/2019     Lab Results   Component Value Date    SEDRATE 10 07/07/2019     Lab Results   Component Value Date CRP 11.4 (H) 07/07/2019     Lab Results   Component Value Date    LABA1C 5.3 10/15/2018     No results found for: EAG    MICROBIOLOGY:   Culture obtained       IMAGING:   Tib/fib radiographs:  Left:  Impression   CALF EDEMA OR INFLAMMATION. NO PLAIN RADIOGRAPHIC EVIDENCE OF FRACTURE OR OSTEOMYELITIS. ADDITIONAL FINDINGS AS ABOVE. Right:  Impression   SOFT TISSUE INFLAMMATION OR EDEMA. NO PLAIN RADIOGRAPHIC EVIDENCE OF ACUTE FRACTURE OR OSTEOMYELITIS. ADDITIONAL FINDINGS AS ABOVE. Venous duplex:  Impression   NO FINDINGS OF DEEP VENOUS THROMBUS IN  THE VISUALIZED VESSELS OF THE RIGHT LOWER EXTREMITY. Patient's case will be discussed with staff, who will provide final recommendations. Thank you for the consult.     Angela Ferguson, PGY3  Please first page [facility] On [facility], [phone]  July 8, 2019  3:07 PM

## 2019-07-08 NOTE — CONSULTS
Consults   Infectious Disease     Patient Name: General Amador  Date: 7/8/2019  YOB: 1935  Medical Record Number: [id number]      Cellulitis right leg        History of Present Illness:  History of dementia hyperlipidemia hypertension hypothyroidism diabetes congestive heart failure      Patient admitted right calf swelling with wound apparently the wound was caused by compression stocking being in place for 48 hours  No fevers chills sweats    Normal white blood cell count    Placed on vancomycin          Review of Systems   Unable to perform ROS: Dementia       Review of Systems: All 14 review of systems negative other than as stated above    Social History     Tobacco Use    Smoking status: Never Smoker    Smokeless tobacco: Never Used   Substance Use Topics    Alcohol use: No    Drug use: No         Past Medical History:   Diagnosis Date    [facility] ([facility]     Carcinoma of ascending colon ([facility] 2014    CHF (congestive heart failure) (HCC)     Chronic atrial fibrillation ([facility] 3/3/2016    Dementia     Foot ulcer, left ([facility] 3/3/2016    Hyperlipidemia     Hypertension     Hypothyroidism     Type II or unspecified type diabetes mellitus without mention of complication, not stated as uncontrolled     diet           Past Surgical History:   Procedure Laterality Date    COLONOSCOPY  5/9/14    bx     OTHER SURGICAL HISTORY  7/29/14    Right colectomy with primary anastomosis and liver bx    RIGHT COLECTOMY  07/29/14    ANDMARK    UPPER GASTROINTESTINAL ENDOSCOPY  5/9/14    bx ike         No current facility-administered medications on file prior to encounter.       Current Outpatient Medications on File Prior to Encounter   Medication Sig Dispense Refill    furosemide (LASIX) 40 MG tablet TAKE 1 TABLET BY MOUTH DAILY 90 tablet 3    lovastatin (MEVACOR) 20 MG tablet TAKE 1 TABLET BY MOUTH NIGHTLY 90 tablet 3    XARELTO 20 MG TABS tablet TAKE 1 TABLET BY MOUTH DAILY WITH BREAKFAST (Start on 1/31/19) 30 tablet 5    potassium chloride (KLOR-CON M) 20 MEQ extended release tablet Take 3 tablets by mouth daily 225 tablet 3    levothyroxine (SYNTHROID) 112 MCG tablet TAKE 1 TABLET BY MOUTH DAILY. 90 tablet 3    fenofibrate 160 MG tablet Take 1 tablet by mouth daily Note to pharmacy: Please dispense generic fenofibrate unless prescriber denotes ELDA 90 tablet 3    nisoldipine (SULAR) 8.5 MG extended release tablet TAKE 1 TABLET BY MOUTH EVERY DAY 90 tablet 3    metoprolol tartrate (LOPRESSOR) 25 MG tablet Take 0.5 tablets by mouth 2 times daily 60 tablet 3    omeprazole (PRILOSEC) 20 MG delayed release capsule Take 1 capsule by mouth daily 90 capsule 3    ferrous sulfate 325 (65 FE) MG tablet Take 1 tablet by mouth 2 times daily 180 tablet 3    aspirin 81 MG tablet Take 81 mg by mouth daily.  Respiratory Therapy Supplies (NEBULIZER COMPRESSOR) KIT 1 kit by Does not apply route once for 1 dose 1 kit 0    ipratropium-albuterol (DUONEB) 0.5-2.5 (3) MG/3ML SOLN nebulizer solution Inhale 3 mLs into the lungs every 6 hours as needed for Shortness of Breath 360 mL 0    Misc. Devices MISC One lift chair, to be used daily Diagnosis CHF, obesity 1 each 0    Lift Chair MISC by Does not apply route 1 each [address] by Does not apply route 1 each 0    SILVERSEAL HYDROGEL DRESSING 4\"X5\" PADS Apply 1 Units topically daily 20 each 2    Incontinence Supply Disposable (ATTENDS BARIATRIC BRIEF XXL) MISC Use around the clock as needed 2 Bottle 0       No Known Allergies      Family History   Problem Relation Age of Onset    Heart Attack Mother     Other Father         Unknown    Cancer Sister         Cervical         Physical Exam:      Physical Exam   Constitutional: He appears well-developed. Obese   HENT:   Head: Normocephalic. Eyes: Pupils are equal, round, and reactive to light. Neck: Normal range of motion. No JVD present. No tracheal deviation present.  No to Ancef

## 2019-07-08 NOTE — CARE COORDINATION
LSW met with pt and spoke to daughter Juan M Clements on the phone. Pt resides with Juan M Clements, who is his primary caregiver, and he generally uses a walker. Juan M Clements still works and pt attends [facility] on [address]. From 6:30-3:30 Mon-Fri. They provide pt care while he is there. If just wound care needed, Juan M Clements feels they may not need HHC, but they are open to it if indicated. She said she can do it as well as a staff member at the [facility]. Will follow for POC. Plan return to home at [place]. Reviewed IMM.

## 2019-07-09 LAB
ANION GAP SERPL CALCULATED.3IONS-SCNC: 11 MEQ/L (ref 9–15)
BASOPHILS ABSOLUTE: 0 K/UL (ref 0–0.2)
BASOPHILS RELATIVE PERCENT: 0.6 %
BUN BLDV-MCNC: 16 MG/DL (ref 8–23)
CALCIUM SERPL-MCNC: 7.7 MG/DL (ref 8.5–9.9)
CHLORIDE BLD-SCNC: 103 MEQ/L (ref 95–107)
CO2: 25 MEQ/L (ref 20–31)
CREAT SERPL-MCNC: 0.87 MG/DL (ref 0.7–1.2)
EOSINOPHILS ABSOLUTE: 0.2 K/UL (ref 0–0.7)
EOSINOPHILS RELATIVE PERCENT: 2.5 %
GFR AFRICAN AMERICAN: >60
GFR NON-AFRICAN AMERICAN: >60
GLUCOSE BLD-MCNC: 104 MG/DL (ref 60–115)
GLUCOSE BLD-MCNC: 155 MG/DL (ref 60–115)
GLUCOSE BLD-MCNC: 70 MG/DL (ref 60–115)
GLUCOSE BLD-MCNC: 81 MG/DL (ref 70–99)
GLUCOSE BLD-MCNC: 87 MG/DL (ref 60–115)
HCT VFR BLD CALC: 36.1 % (ref 42–52)
HEMOGLOBIN: 12.4 G/DL (ref 14–18)
LACTIC ACID: 1.1 MMOL/L (ref 0.5–2.2)
LYMPHOCYTES ABSOLUTE: 0.8 K/UL (ref 1–4.8)
LYMPHOCYTES RELATIVE PERCENT: 13 %
MCH RBC QN AUTO: 33 PG (ref 27–31.3)
MCHC RBC AUTO-ENTMCNC: 34.3 % (ref 33–37)
MCV RBC AUTO: 96.2 FL (ref 80–100)
MONOCYTES ABSOLUTE: 0.5 K/UL (ref 0.2–0.8)
MONOCYTES RELATIVE PERCENT: 7.2 %
NEUTROPHILS ABSOLUTE: 4.8 K/UL (ref 1.4–6.5)
NEUTROPHILS RELATIVE PERCENT: 76.7 %
PDW BLD-RTO: 15.1 % (ref 11.5–14.5)
PERFORMED ON: ABNORMAL
PERFORMED ON: NORMAL
PLATELET # BLD: 130 K/UL (ref 130–400)
POTASSIUM REFLEX MAGNESIUM: 3.7 MEQ/L (ref 3.4–4.9)
RBC # BLD: 3.75 M/UL (ref 4.7–6.1)
SODIUM BLD-SCNC: 139 MEQ/L (ref 135–144)
WBC # BLD: 6.3 K/UL (ref 4.8–10.8)

## 2019-07-09 PROCEDURE — 83605 ASSAY OF LACTIC ACID: CPT

## 2019-07-09 PROCEDURE — 6370000000 HC RX 637 (ALT 250 FOR IP): Performed by: INTERNAL MEDICINE

## 2019-07-09 PROCEDURE — 6360000002 HC RX W HCPCS: Performed by: INTERNAL MEDICINE

## 2019-07-09 PROCEDURE — 1210000000 HC MED SURG R&B

## 2019-07-09 PROCEDURE — 2500000003 HC RX 250 WO HCPCS: Performed by: INTERNAL MEDICINE

## 2019-07-09 PROCEDURE — 2580000003 HC RX 258: Performed by: PHYSICIAN ASSISTANT

## 2019-07-09 PROCEDURE — 85025 COMPLETE CBC W/AUTO DIFF WBC: CPT

## 2019-07-09 PROCEDURE — 99232 SBSQ HOSP IP/OBS MODERATE 35: CPT | Performed by: INTERNAL MEDICINE

## 2019-07-09 PROCEDURE — 80048 BASIC METABOLIC PNL TOTAL CA: CPT

## 2019-07-09 PROCEDURE — 2580000003 HC RX 258: Performed by: INTERNAL MEDICINE

## 2019-07-09 PROCEDURE — 6370000000 HC RX 637 (ALT 250 FOR IP): Performed by: PHYSICIAN ASSISTANT

## 2019-07-09 PROCEDURE — 36415 COLL VENOUS BLD VENIPUNCTURE: CPT

## 2019-07-09 PROCEDURE — 97162 PT EVAL MOD COMPLEX 30 MIN: CPT

## 2019-07-09 RX ORDER — KETOROLAC TROMETHAMINE 15 MG/ML
15 INJECTION, SOLUTION INTRAMUSCULAR; INTRAVENOUS EVERY 6 HOURS PRN
Status: DISCONTINUED | OUTPATIENT
Start: 2019-07-09 | End: 2019-07-10 | Stop reason: HOSPADM

## 2019-07-09 RX ADMIN — INSULIN LISPRO 1 UNITS: 100 INJECTION, SOLUTION INTRAVENOUS; SUBCUTANEOUS at 21:42

## 2019-07-09 RX ADMIN — KETOROLAC TROMETHAMINE 15 MG: 15 INJECTION, SOLUTION INTRAMUSCULAR; INTRAVENOUS at 21:44

## 2019-07-09 RX ADMIN — DEXTROSE MONOHYDRATE 1 G: 5 INJECTION INTRAVENOUS at 00:22

## 2019-07-09 RX ADMIN — Medication 10 ML: at 20:36

## 2019-07-09 RX ADMIN — LEVOTHYROXINE SODIUM 112 MCG: 112 TABLET ORAL at 08:41

## 2019-07-09 RX ADMIN — AMLODIPINE BESYLATE 2.5 MG: 2.5 TABLET ORAL at 08:42

## 2019-07-09 RX ADMIN — DEXTROSE MONOHYDRATE 1 G: 5 INJECTION INTRAVENOUS at 15:44

## 2019-07-09 RX ADMIN — PANTOPRAZOLE SODIUM 40 MG: 40 TABLET, DELAYED RELEASE ORAL at 06:35

## 2019-07-09 RX ADMIN — POTASSIUM CHLORIDE 60 MEQ: 20 TABLET, EXTENDED RELEASE ORAL at 08:41

## 2019-07-09 RX ADMIN — FERROUS SULFATE TAB 325 MG (65 MG ELEMENTAL FE) 325 MG: 325 (65 FE) TAB at 20:35

## 2019-07-09 RX ADMIN — DEXTROSE MONOHYDRATE 1 G: 5 INJECTION INTRAVENOUS at 23:45

## 2019-07-09 RX ADMIN — METOPROLOL TARTRATE 12.5 MG: 25 TABLET ORAL at 08:42

## 2019-07-09 RX ADMIN — FUROSEMIDE 40 MG: 40 TABLET ORAL at 08:41

## 2019-07-09 RX ADMIN — RIVAROXABAN 20 MG: 20 TABLET, FILM COATED ORAL at 17:37

## 2019-07-09 RX ADMIN — ASPIRIN 81 MG: 81 TABLET ORAL at 08:41

## 2019-07-09 RX ADMIN — METOPROLOL TARTRATE 12.5 MG: 25 TABLET ORAL at 20:35

## 2019-07-09 RX ADMIN — FERROUS SULFATE TAB 325 MG (65 MG ELEMENTAL FE) 325 MG: 325 (65 FE) TAB at 08:42

## 2019-07-09 RX ADMIN — DEXTROSE MONOHYDRATE 1 G: 5 INJECTION INTRAVENOUS at 06:35

## 2019-07-09 RX ADMIN — MICONAZOLE NITRATE: 20 POWDER TOPICAL at 20:42

## 2019-07-09 RX ADMIN — FENOFIBRATE 160 MG: 160 TABLET ORAL at 08:41

## 2019-07-09 RX ADMIN — Medication 10 ML: at 08:42

## 2019-07-09 RX ADMIN — LOVASTATIN 20 MG: 20 TABLET ORAL at 20:36

## 2019-07-09 ASSESSMENT — PAIN SCALES - GENERAL
PAINLEVEL_OUTOF10: 0
PAINLEVEL_OUTOF10: 0
PAINLEVEL_OUTOF10: 3

## 2019-07-10 VITALS
WEIGHT: 275.35 LBS | OXYGEN SATURATION: 99 % | HEART RATE: 64 BPM | TEMPERATURE: 97.7 F | BODY MASS INDEX: 35.34 KG/M2 | HEIGHT: 74 IN | DIASTOLIC BLOOD PRESSURE: 64 MMHG | RESPIRATION RATE: 18 BRPM | SYSTOLIC BLOOD PRESSURE: 108 MMHG

## 2019-07-10 LAB
ANION GAP SERPL CALCULATED.3IONS-SCNC: 13 MEQ/L (ref 9–15)
BASOPHILS ABSOLUTE: 0 K/UL (ref 0–0.2)
BASOPHILS RELATIVE PERCENT: 0.5 %
BUN BLDV-MCNC: 17 MG/DL (ref 8–23)
CALCIUM SERPL-MCNC: 8 MG/DL (ref 8.5–9.9)
CHLORIDE BLD-SCNC: 103 MEQ/L (ref 95–107)
CO2: 23 MEQ/L (ref 20–31)
CREAT SERPL-MCNC: 0.84 MG/DL (ref 0.7–1.2)
EOSINOPHILS ABSOLUTE: 0.1 K/UL (ref 0–0.7)
EOSINOPHILS RELATIVE PERCENT: 2.2 %
GFR AFRICAN AMERICAN: >60
GFR NON-AFRICAN AMERICAN: >60
GLUCOSE BLD-MCNC: 105 MG/DL (ref 60–115)
GLUCOSE BLD-MCNC: 74 MG/DL (ref 60–115)
GLUCOSE BLD-MCNC: 76 MG/DL (ref 70–99)
HCT VFR BLD CALC: 37 % (ref 42–52)
HEMOGLOBIN: 12.7 G/DL (ref 14–18)
LACTIC ACID: 1.3 MMOL/L (ref 0.5–2.2)
LYMPHOCYTES ABSOLUTE: 0.8 K/UL (ref 1–4.8)
LYMPHOCYTES RELATIVE PERCENT: 15 %
MCH RBC QN AUTO: 33.3 PG (ref 27–31.3)
MCHC RBC AUTO-ENTMCNC: 34.2 % (ref 33–37)
MCV RBC AUTO: 97.3 FL (ref 80–100)
MONOCYTES ABSOLUTE: 0.4 K/UL (ref 0.2–0.8)
MONOCYTES RELATIVE PERCENT: 7.9 %
NEUTROPHILS ABSOLUTE: 4.1 K/UL (ref 1.4–6.5)
NEUTROPHILS RELATIVE PERCENT: 74.4 %
PDW BLD-RTO: 15.1 % (ref 11.5–14.5)
PERFORMED ON: NORMAL
PERFORMED ON: NORMAL
PLATELET # BLD: 139 K/UL (ref 130–400)
POTASSIUM REFLEX MAGNESIUM: 3.9 MEQ/L (ref 3.4–4.9)
RBC # BLD: 3.81 M/UL (ref 4.7–6.1)
SODIUM BLD-SCNC: 139 MEQ/L (ref 135–144)
WBC # BLD: 5.5 K/UL (ref 4.8–10.8)

## 2019-07-10 PROCEDURE — 80048 BASIC METABOLIC PNL TOTAL CA: CPT

## 2019-07-10 PROCEDURE — 83605 ASSAY OF LACTIC ACID: CPT

## 2019-07-10 PROCEDURE — 85025 COMPLETE CBC W/AUTO DIFF WBC: CPT

## 2019-07-10 PROCEDURE — 6370000000 HC RX 637 (ALT 250 FOR IP): Performed by: INTERNAL MEDICINE

## 2019-07-10 PROCEDURE — 97116 GAIT TRAINING THERAPY: CPT

## 2019-07-10 PROCEDURE — 99232 SBSQ HOSP IP/OBS MODERATE 35: CPT | Performed by: INTERNAL MEDICINE

## 2019-07-10 PROCEDURE — 36415 COLL VENOUS BLD VENIPUNCTURE: CPT

## 2019-07-10 RX ORDER — CEPHALEXIN 500 MG/1
500 CAPSULE ORAL 3 TIMES DAILY
Qty: 30 CAPSULE | Refills: 0 | Status: SHIPPED | OUTPATIENT
Start: 2019-07-10 | End: 2019-07-20

## 2019-07-10 RX ORDER — CEPHALEXIN 500 MG/1
500 CAPSULE ORAL 3 TIMES DAILY
Refills: 0 | DISCHARGE
Start: 2019-07-10 | End: 2019-07-20

## 2019-07-10 RX ORDER — CEPHALEXIN 500 MG/1
500 CAPSULE ORAL EVERY 8 HOURS SCHEDULED
Status: DISCONTINUED | OUTPATIENT
Start: 2019-07-10 | End: 2019-07-10 | Stop reason: HOSPADM

## 2019-07-10 RX ADMIN — FERROUS SULFATE TAB 325 MG (65 MG ELEMENTAL FE) 325 MG: 325 (65 FE) TAB at 10:33

## 2019-07-10 RX ADMIN — AMLODIPINE BESYLATE 2.5 MG: 2.5 TABLET ORAL at 10:33

## 2019-07-10 RX ADMIN — POTASSIUM CHLORIDE 60 MEQ: 20 TABLET, EXTENDED RELEASE ORAL at 10:33

## 2019-07-10 RX ADMIN — MICONAZOLE NITRATE: 20 POWDER TOPICAL at 10:36

## 2019-07-10 RX ADMIN — FENOFIBRATE 160 MG: 160 TABLET ORAL at 10:32

## 2019-07-10 RX ADMIN — METOPROLOL TARTRATE 12.5 MG: 25 TABLET ORAL at 10:32

## 2019-07-10 RX ADMIN — CEPHALEXIN 500 MG: 500 CAPSULE ORAL at 15:07

## 2019-07-10 RX ADMIN — FUROSEMIDE 40 MG: 40 TABLET ORAL at 10:33

## 2019-07-10 RX ADMIN — ASPIRIN 81 MG: 81 TABLET ORAL at 10:32

## 2019-07-10 RX ADMIN — RIVAROXABAN 20 MG: 20 TABLET, FILM COATED ORAL at 10:32

## 2019-07-10 RX ADMIN — LEVOTHYROXINE SODIUM 112 MCG: 112 TABLET ORAL at 10:32

## 2019-07-10 NOTE — PROGRESS NOTES
Consults   Infectious Disease     Patient Name: Adore Valderrama  Date: 7/10/2019  YOB: 1935  Medical Record Number: [id number]      Cellulitis right leg        History of Present Illness:  History of dementia hyperlipidemia hypertension hypothyroidism diabetes congestive heart failure      Patient admitted right calf swelling with wound apparently the wound was caused by compression stocking being in place for 48 hours  No fevers chills sweats            Review of Systems   Unable to perform ROS: Dementia     /64   Pulse 64   Temp 97.7 °F (36.5 °C)   Resp 18   Ht 6' 2\" (1.88 m)   Wt 275 lb 5.7 oz (124.9 kg)   SpO2 99%   BMI 35.35 kg/m²        Physical Exam   Constitutional:   Obese   Cardiovascular: Intact distal pulses. Pulmonary/Chest: Effort normal and breath sounds normal. He has no wheezes. He has no rales. He exhibits no tenderness. Abdominal: Soft. Bowel sounds are normal. He exhibits no distension and no mass. There is no tenderness. There is no rebound and no guarding. Musculoskeletal: He exhibits edema and tenderness. Legs:  Lymphadenopathy:     He has no axillary adenopathy. Right: No inguinal, no supraclavicular and no epitrochlear adenopathy present. Left: No inguinal, no supraclavicular and no epitrochlear adenopathy present. Neurological: He is alert. Skin: Skin is warm. There is erythema. Wound Culture [[id number]] (Abnormal) Collected: 07/08/19 2047   Order Status: Completed Specimen: Leg Updated: 07/10/19 0954    WOUND/ABSCESS --    Gram Stain Result No WBC's   Rare Gram negative rods   Rare Gram positive cocci   Abnormal     Organism Gram negative rodAbnormal        WBC 5.5  4.8 - 10.8 K/uL Final 07/10/2019  6:33 AM [facility]   RBC 3.81Low   4.70 - 6.10 M/uL Final 07/10/2019  6:33 AM [facility]   Hemoglobin 12.7Low   14.0 - 18.0 g/dL Final 07/10/2019  6:33 AM [facility]   Hematocrit 37. 0Low
Consults   Infectious Disease     Patient Name: Katya Elizondo  Date: 7/9/2019  YOB: 1935  Medical Record Number: [id number]      Cellulitis right leg        History of Present Illness:  History of dementia hyperlipidemia hypertension hypothyroidism diabetes congestive heart failure      Patient admitted right calf swelling with wound apparently the wound was caused by compression stocking being in place for 48 hours  No fevers chills sweats    Normal white blood cell count    Placed on vancomycin          Review of Systems   Unable to perform ROS: Dementia          Physical Exam   Constitutional:   Obese   Cardiovascular: Intact distal pulses. Pulmonary/Chest: Effort normal and breath sounds normal. He has no wheezes. He has no rales. He exhibits no tenderness. Abdominal: Soft. Bowel sounds are normal. He exhibits no distension and no mass. There is no tenderness. There is no rebound and no guarding. Musculoskeletal: He exhibits edema and tenderness. Legs:  Lymphadenopathy:     He has no axillary adenopathy. Right: No inguinal, no supraclavicular and no epitrochlear adenopathy present. Left: No inguinal, no supraclavicular and no epitrochlear adenopathy present. Neurological: He is alert. Skin: Skin is warm. There is erythema. Blood pressure (!) 140/71, pulse 61, temperature 98. 222 °F (36.8 °C), resp. rate 18, height 6' 2\" (1.88 m), weight 275 lb 5.7 oz (124.9 kg), SpO2 98 %.             ASSESSMENT:  Patient Active Problem List   Diagnosis    Type 2 diabetes mellitus without complication ([facility]    Essential hypertension    Hyperlipidemia    Malignant neoplasm of ascending colon (HCC)    Cirrhosis (HCC)    Chronic atrial fibrillation (HCC)    Hypothyroidism    CHF (congestive heart failure) (HCC)    History of colon cancer    History of partial colectomy    Acute encephalopathy    Gait abnormality    VTE (venous thromboembolism)    Bradycardia   
PODIATRIC MEDICINE AND SURGERY  CONSULT PROGRESS NOTE      Opinion/advice regarding: RLE cellulitis and wound      ASSESSMENT:  81 yo male with a h/o dementia admitted from [facility] for right leg cellulitis and wound. Patient with chronic venous insufficiency with new, superficial wound to right leg with associated cellulitis. Will treat with local wound care and IV antibiotics per ID    Plan:  Exam and evaluation  Wound dressed today   - Continue dressing changes as outpatient  Recommend ACE bandages to b/l LE for compression  Recommend leg elevation while in bed  Off-load heels  No WB restrictions  Antibiotics per ID  Saw and discussed with staff, Dr. Varun Chavez who will provide final recommendations  Can follow-up in wound center in 2 weeks with Dr. Devora Morales to d/c from Podiatry standpoint when medically cleared      Interval HPI: Patient doing well today, no acute events overnight. Feeling better. Legs appear less swollen today. No new complaints. Ready to go back to nursing home. Patient denies any fevers, chills, nausea, vomiting, chest pain or shortness of breath. No current facility-administered medications on file prior to encounter. Current Outpatient Medications on File Prior to Encounter   Medication Sig Dispense Refill    furosemide (LASIX) 40 MG tablet TAKE 1 TABLET BY MOUTH DAILY 90 tablet 3    lovastatin (MEVACOR) 20 MG tablet TAKE 1 TABLET BY MOUTH NIGHTLY 90 tablet 3    XARELTO 20 MG TABS tablet TAKE 1 TABLET BY MOUTH DAILY WITH BREAKFAST (Start on 1/31/19) 30 tablet 5    potassium chloride (KLOR-CON M) 20 MEQ extended release tablet Take 3 tablets by mouth daily 225 tablet 3    levothyroxine (SYNTHROID) 112 MCG tablet TAKE 1 TABLET BY MOUTH DAILY.  90 tablet 3    fenofibrate 160 MG tablet Take 1 tablet by mouth daily Note to pharmacy: Please dispense generic fenofibrate unless prescriber denotes ELDA 90 tablet 3    nisoldipine (SULAR) 8.5 MG extended release tablet TAKE 1
Physician Progress Note    2019   10:52 AM    Name:  Tasha Lincoln  MRN:    [id number]     IP Day: 1     Admit Date: 2019  7:11 PM  PCP: Lendel Curling, MD    Code Status:  Full Code    Subjective:      no new events. Doing well.      Physical Examination:      Vitals:  /68   Pulse 59   Temp 97.8 °F (36.6 °C) (Oral)   Resp 20   Ht 6' 2\" (1.88 m)   Wt 268 lb 11.9 oz (121.9 kg)   SpO2 98%   BMI 34.50 kg/m²   Temp (24hrs), Av.8 °F (36.6 °C), Min:97.7 °F (36.5 °C), Max:97.9 °F (36.6 °C)      General appearance: alert, cooperative and no distress  Mental Status: oriented to person, place and time and normal affect  Lungs: clear to auscultation bilaterally, normal effort  Heart: regular rate and rhythm, no murmur  Abdomen: soft, nontender, nondistended, bowel sounds present, no masses  Extremities: +edema and  Redness in BLE, no tenderness in the calves    Data:     Labs:  Recent Labs     19  1646 19  0647   WBC 11.7* 8.3   HGB 14.5 13.2*    132     Recent Labs     19  1642 19  1801 19  0647     --  139   K 3.9  --  3.5     --  105   CO2 24  --  21   BUN 24*  --  22   CREATININE 0.88  --  0.77   GLUCOSE 140* 148 91     Recent Labs     19  1642   AST 42*   ALT 19   BILITOT 1.4*   ALKPHOS 108*       Current Facility-Administered Medications   Medication Dose Route Frequency Provider Last Rate Last Dose    aspirin EC tablet 81 mg  81 mg Oral Daily Connie Doll DO        fenofibrate tablet 160 mg  160 mg Oral Daily Connie Doll DO        ferrous sulfate tablet 325 mg  325 mg Oral BID Taj Oviedo DO        furosemide (LASIX) tablet 40 mg  40 mg Oral Daily Connie Doll DO        ipratropium-albuterol (DUONEB) nebulizer solution 3 mL  1 vial Inhalation Q6H PRN Connie Doll DO        levothyroxine (SYNTHROID) tablet 112 mcg  112 mcg Oral Daily Taj Oviedo DO        lovastatin (MEVACOR) tablet 20 mg  20 mg Oral Nightly
Physician Progress Note    7/10/2019   9:48 AM    Name:  Juan A Garces  MRN:    [id number]      Day: 3     Admit Date: 7/7/2019  7:11 PM  PCP: Mel Hadley MD    Code Status:  Full Code    Subjective:      no new events. Doing well. Physical Examination:      Vitals:  BP (!) 145/85   Pulse 60   Temp 98. 222 °F (36.8 °C)   Resp 18   Ht 6' 2\" (1.88 m)   Wt 275 lb 5.7 oz (124.9 kg)   SpO2 100%   BMI 35.35 kg/m²   No data recorded.       General appearance: alert, cooperative and no distress  Mental Status: oriented to person, place and time and normal affect  Lungs: clear to auscultation bilaterally, normal effort  Heart: regular rate and rhythm, no murmur  Abdomen: soft, nontender, nondistended, bowel sounds present, no masses  Extremities: +edema and  Redness in BLE, no tenderness in the calves    Data:     Labs:  Recent Labs     07/09/19  0736 07/10/19  0633   WBC 6.3 5.5   HGB 12.4* 12.7*    139     Recent Labs     07/09/19  0736 07/10/19  0633    139   K 3.7 3.9    103   CO2 25 23   BUN 16 17   CREATININE 0.87 0.84   GLUCOSE 81 76     Recent Labs     07/07/19  1642   AST 42*   ALT 19   BILITOT 1.4*   ALKPHOS 108*       Current Facility-Administered Medications   Medication Dose Route Frequency Provider Last Rate Last Dose    miconazole (MICOTIN) 2 % powder   Topical 4x Daily PRN [address], DO        ketorolac (TORADOL) injection 15 mg  15 mg Intravenous Q6H PRN Efe Black MD   15 mg at 07/09/19 2144    aspirin EC tablet 81 mg  81 mg Oral Daily [address], DO   81 mg at 07/09/19 0841    fenofibrate tablet 160 mg  160 mg Oral Daily [address], DO   160 mg at 07/09/19 0841    ferrous sulfate tablet 325 mg  325 mg Oral BID [address], DO   325 mg at 07/09/19 2035    furosemide (LASIX) tablet 40 mg  40 mg Oral Daily [address], DO   40 mg at 07/09/19 0841    ipratropium-albuterol (DUONEB) nebulizer solution 3 mL  1 vial Inhalation Q6H PRN Yudy Chow
Disorder  (acute or chronic)  []   Severe or Chronic w/ Exacerbation  []     Surgical Status No [x]   Surgeries     General []   Surgery Lower []   Abdominal Thoracic or []   Upper Abdominal Thoracic with  PulmonaryDisorder  []     Chest X-ray Clear/Not  Ordered     [x]  Chronic Changes  Results Pending  []  Infiltrates, atelectasis, pleural effusion, or edema  []  Infiltrates in more than one lobe []  Infiltrate + Atelectasis, &/or pleural effusion  []    Respiratory Pattern Regular,  RR = 12-20 [x]  Increased,  RR = 21-25 []  BARRERA, irregular,  or RR = 26-30 []  Decreased FEV1  or RR = 31-35 []  Severe SOB, use  of accessory muscles, or RR ? 35  []    Mental Status Alert, oriented,  Cooperative [x]  Confused but Follows commands []  Lethargic or unable to follow commands []  Obtunded  []  Comatose  []    Breath Sounds Clear to  auscultation  [x]  Decreased unilaterally or  in bases only []  Decreased  bilaterally  []  Crackles or intermittent wheezes []  Wheezes []    Cough Strong, Spontan., & nonproductive [x]  Strong,  spontaneous, &  productive []  Weak,  Nonproductive []  Weak, productive or  with wheezes []  No spontaneous  cough or may require suctioning []    Level of Activity Ambulatory [x]  Ambulatory w/ Assist  []  Non-ambulatory []  Paraplegic []  Quadriplegic []    Total    Score:___0____     Triage Score:_____5___      Tri       Triage:     1. (>20) Freq: Q3    2. (16-20) Freq: Q4   3. (11-15) Freq: QID & Albuterol Q2 PRN    4. (6-10) Freq: TID & Albuterol Q2 PRN    5. (0-5) Freq Q4prn
consulted  - DVT ruled out     # h/o afib   - resume BB and xarelto     # h/o dementia   - doing well.  Calm and cooperative     # DM  - resume SSI, monitor       DVT/PPx        DISCHARGE PLANNING  Pending w/u        Electronically signed by Lise Griffin DO on 7/9/2019 at 11:00 AM
term goal 3: ambulate >100 ft with supervision FWW  Long term goal 4: tolerate >10 min dynamic activity    AMPAC (6 CLICK) BASIC MOBILITY  AM-PAC Inpatient Mobility Raw Score : 19     Therapy Time:   Individual   Time In 1140   Time Out 1158   Minutes [address], [address] [facility], 07/09/19 at 1:23 PM

## 2019-07-10 NOTE — DISCHARGE INSTR - DIET

## 2019-07-10 NOTE — DISCHARGE SUMMARY
Disposition:   If discharged to Home, Any [place] [id number] needs that were indicated and/or required as been addressed and set up by Social Work. Condition at discharge: Pt was medically stable at the time of discharge. Activity: activity as tolerated    Total time taken for discharging this patient: 40 minutes. Greater than 70% of time was spent focused exclusively on this patient. Time was taken to review chart, discuss plans with consultants, reconciling medications, discussing plan answering questions with patient.      Malka Key  7/10/2019, 9:48 AM  ----------------------------------------------------------------------------------------------------------------------    Adore Valderrama

## 2019-07-10 NOTE — DISCHARGE INSTR - COC
diabetes mellitus without complication (HCC) D57.5    Essential hypertension I10    Hyperlipidemia E78.5    Malignant neoplasm of ascending colon (HCC) C18.2    Cirrhosis (HCC) K74.60    Chronic atrial fibrillation (HCC) I48.2    Hypothyroidism E03.9    CHF (congestive heart failure) (HCC) I50.9    History of colon cancer Z85.038    History of partial colectomy Z90.49    Acute encephalopathy G93.40    Gait abnormality R26.9    VTE (venous thromboembolism) I82.90    Bradycardia R00.1    Pacemaker Z95.0    SSS (sick sinus syndrome) (HCC) I49.5    Cellulitis of right lower extremity L03.115       Isolation/Infection:   Isolation          No Isolation            Nurse Assessment:  Last Vital Signs: BP (!) 145/85   Pulse 60   Temp 98. 222 °F (36.8 °C)   Resp 18   Ht 6' 2\" (1.88 m)   Wt 275 lb 5.7 oz (124.9 kg)   SpO2 100%   BMI 35.35 kg/m²     Last documented pain score (0-10 scale): Pain Level: 3  Last Weight:   Wt Readings from Last 1 Encounters:   07/09/19 275 lb 5.7 oz (124.9 kg)     Mental Status:  disoriented, oriented and alert    IV Access:  - None    Nursing Mobility/ADLs:  Walking   Assisted  Transfer  Assisted  Bathing  Assisted  Dressing  Assisted  Toileting  Assisted  Feeding  Assisted  Med Admin  Assisted  Med Delivery   whole    Wound Care Documentation and Therapy:  Wound 07/09/19 Pretibial Right (Active)   Wound Other 7/9/2019  8:30 PM   Dressing Status Clean;Dry; Intact 7/9/2019  8:30 PM   Dressing Changed Changed/New 7/9/2019 10:30 AM   Dressing/Treatment Ace Wrap;Other (comment); Xeroform 7/9/2019  8:30 PM   Wound Cleansed Rinsed/Irrigated with saline 7/9/2019 10:30 AM   Dressing Change Due 07/10/19 7/9/2019 10:30 AM   Wound Assessment NIKKO 7/9/2019  8:30 PM   Dora-wound Assessment NIKKO 7/9/2019  8:30 PM   Culture Taken No 7/9/2019  8:30 PM   Number of days: 0        Elimination:  Continence:   · Bowel:  Yes  · Bladder: No  Urinary Catheter: None   Colostomy/Ileostomy/Ileal Conduit: No       Date of Last BM: 7/10/2019      Intake/Output Summary (Last 24 hours) at 7/10/2019 0947  Last data filed at 7/10/2019 0609  Gross per 24 hour   Intake 720 ml   Output 150 ml   Net 570 ml     I/O last 3 completed shifts: In: 840 [P.O.:840]  Out: 250 [Urine:250]    Safety Concerns:     Sundowners [place] and At Risk for Falls    Impairments/Disabilities:      None    Nutrition Therapy:  Current Nutrition Therapy:   - Oral Diet:  Carb Control 4 carbs/meal (1800kcals/day)    Routes of Feeding: Oral  Liquids: Thin Liquids  Daily Fluid Restriction: no  Last Modified Barium Swallow with Video (Video Swallowing Test): not done    Treatments at the Time of Hospital Discharge:   Respiratory Treatments: albuterol as needed  Oxygen Therapy:  is not on home oxygen therapy.   Ventilator:    - No ventilator support    Rehab Therapies: none    Weight Bearing Status/Restrictions: No weight bearing restirctions  Other Medical Equipment (for information only, NOT a DME order):  walker  Other Treatments: none      Patient's personal belongings (please select all that are sent with patient):  None    RN SIGNATURE:  Electronically signed by Gina Tyson RN on 7/10/19 at 3:35 PM    CASE MANAGEMENT/SOCIAL WORK SECTION    Inpatient Status Date: ***    Readmission Risk Assessment Score:  Readmission Risk              Risk of Unplanned Readmission:        19           Discharging to Facility/ Agency   · Name:   · Address:  · Phone:  · Fax:    Dialysis Facility (if applicable)   · Name:  · Address:  · Dialysis Schedule:  · Phone:  · Fax:    / signature: {Esignature:[id number]}    PHYSICIAN SECTION    Prognosis: Fair    Condition at Discharge: Stable    Rehab Potential (if transferring to Rehab): Good    Recommended Labs or Other Treatments After Discharge: janay     Physician Certification: I certify the above information and transfer of Tasha Lincoln  is necessary for the continuing treatment of the

## 2019-07-10 NOTE — PLAN OF CARE
Pt remains free from falls, bed in lowest position, wheels locked, non skid footwear worn, hourly rounding completed, call light within reach, pt instructed to use call light.

## 2019-07-11 ENCOUNTER — CARE COORDINATION (OUTPATIENT)
Dept: CASE MANAGEMENT | Age: 84
End: 2019-07-11

## 2019-07-11 LAB
GRAM STAIN RESULT: ABNORMAL
ORGANISM: ABNORMAL
WOUND/ABSCESS: ABNORMAL
WOUND/ABSCESS: ABNORMAL

## 2019-07-12 ENCOUNTER — CARE COORDINATION (OUTPATIENT)
Dept: CASE MANAGEMENT | Age: 84
End: 2019-07-12

## 2019-07-12 LAB
BLOOD CULTURE, ROUTINE: NORMAL
BLOOD CULTURE, ROUTINE: NORMAL
CULTURE, BLOOD 2: NORMAL
CULTURE, BLOOD 2: NORMAL

## 2019-07-15 ENCOUNTER — CARE COORDINATION (OUTPATIENT)
Dept: CASE MANAGEMENT | Age: 84
End: 2019-07-15

## 2019-07-15 ENCOUNTER — OFFICE VISIT (OUTPATIENT)
Dept: FAMILY MEDICINE CLINIC | Age: 84
End: 2019-07-15
Payer: MEDICARE

## 2019-07-15 VITALS
TEMPERATURE: 97.9 F | OXYGEN SATURATION: 95 % | HEART RATE: 70 BPM | DIASTOLIC BLOOD PRESSURE: 62 MMHG | SYSTOLIC BLOOD PRESSURE: 90 MMHG

## 2019-07-15 DIAGNOSIS — M79.89 LEG SWELLING: ICD-10-CM

## 2019-07-15 DIAGNOSIS — L03.115 CELLULITIS OF RIGHT LOWER EXTREMITY: Primary | ICD-10-CM

## 2019-07-15 PROCEDURE — 99213 OFFICE O/P EST LOW 20 MIN: CPT | Performed by: NURSE PRACTITIONER

## 2019-07-15 PROCEDURE — 1123F ACP DISCUSS/DSCN MKR DOCD: CPT | Performed by: NURSE PRACTITIONER

## 2019-07-15 PROCEDURE — 4040F PNEUMOC VAC/ADMIN/RCVD: CPT | Performed by: NURSE PRACTITIONER

## 2019-07-15 PROCEDURE — 1036F TOBACCO NON-USER: CPT | Performed by: NURSE PRACTITIONER

## 2019-07-15 PROCEDURE — 1111F DSCHRG MED/CURRENT MED MERGE: CPT | Performed by: NURSE PRACTITIONER

## 2019-07-15 PROCEDURE — G8427 DOCREV CUR MEDS BY ELIG CLIN: HCPCS | Performed by: NURSE PRACTITIONER

## 2019-07-15 PROCEDURE — G8417 CALC BMI ABV UP PARAM F/U: HCPCS | Performed by: NURSE PRACTITIONER

## 2019-07-15 NOTE — PROGRESS NOTES
Subjective:      Patient ID: Sherry Vallejo is a 82 y.o. male who presents today for:     Chief Complaint   Patient presents with    Follow-Up from Hospital       HPI    Patient here for follow up. Was in hospital for right lower leg cellulitis infection, was treated with antibiotics, sent home on Keflex. Taking medication, doing well. Patient's caregiver friend here. Daughter has been taking care of him, doing daily dressing changes. States swelling and redness has gone way down but still has some there. States that daughter wants silvadene as it really helps dry up the open spots. Past Medical History:   Diagnosis Date    At[facility] ([facility]     Carcinoma of ascending colon ([facility] 2014    CHF (congestive heart failure) (HCC)     Chronic atrial fibrillation ([facility] 3/3/2016    Dementia     Foot ulcer, left ([facility] 3/3/2016    Hyperlipidemia     Hypertension     Hypothyroidism     Type II or unspecified type diabetes mellitus without mention of complication, not stated as uncontrolled     diet     Past Surgical History:   Procedure Laterality Date    COLONOSCOPY  5/9/14    bx     OTHER SURGICAL HISTORY  7/29/14    Right colectomy with primary anastomosis and liver bx    RIGHT COLECTOMY  07/29/14    ANDRASKO    UPPER GASTROINTESTINAL ENDOSCOPY  5/9/14    bx ike     Family History   Problem Relation Age of Onset    Heart Attack Mother     Other Father         Unknown    Cancer Sister         Cervical     Social History     Socioeconomic History    Marital status:       Spouse name: Not on file    Number of children: Not on file    Years of education: Not on file    Highest education level: Not on file   Occupational History    Not on file   Social Needs    Financial resource strain: Not on file    Food insecurity:     Worry: Not on file     Inability: Not on file    Transportation needs:     Medical: Not on file     Non-medical: Not on file   Tobacco Use    Smoking status:

## 2019-07-16 ASSESSMENT — ENCOUNTER SYMPTOMS
GASTROINTESTINAL NEGATIVE: 1
RESPIRATORY NEGATIVE: 1

## 2019-07-18 ENCOUNTER — CARE COORDINATION (OUTPATIENT)
Dept: CARE COORDINATION | Age: 84
End: 2019-07-18

## 2019-07-20 ENCOUNTER — OFFICE VISIT (OUTPATIENT)
Dept: FAMILY MEDICINE CLINIC | Age: 84
End: 2019-07-20
Payer: MEDICARE

## 2019-07-20 VITALS
BODY MASS INDEX: 35.16 KG/M2 | TEMPERATURE: 97 F | OXYGEN SATURATION: 97 % | SYSTOLIC BLOOD PRESSURE: 118 MMHG | WEIGHT: 274 LBS | HEIGHT: 74 IN | DIASTOLIC BLOOD PRESSURE: 70 MMHG | HEART RATE: 89 BPM | RESPIRATION RATE: 18 BRPM

## 2019-07-20 DIAGNOSIS — R58 ECCHYMOSIS: Primary | ICD-10-CM

## 2019-07-20 PROCEDURE — 1123F ACP DISCUSS/DSCN MKR DOCD: CPT | Performed by: PHYSICIAN ASSISTANT

## 2019-07-20 PROCEDURE — 1111F DSCHRG MED/CURRENT MED MERGE: CPT | Performed by: PHYSICIAN ASSISTANT

## 2019-07-20 PROCEDURE — G8427 DOCREV CUR MEDS BY ELIG CLIN: HCPCS | Performed by: PHYSICIAN ASSISTANT

## 2019-07-20 PROCEDURE — G8417 CALC BMI ABV UP PARAM F/U: HCPCS | Performed by: PHYSICIAN ASSISTANT

## 2019-07-20 PROCEDURE — 1036F TOBACCO NON-USER: CPT | Performed by: PHYSICIAN ASSISTANT

## 2019-07-20 PROCEDURE — 99213 OFFICE O/P EST LOW 20 MIN: CPT | Performed by: PHYSICIAN ASSISTANT

## 2019-07-20 PROCEDURE — 4040F PNEUMOC VAC/ADMIN/RCVD: CPT | Performed by: PHYSICIAN ASSISTANT

## 2019-07-20 ASSESSMENT — ENCOUNTER SYMPTOMS
SHORTNESS OF BREATH: 0
COLOR CHANGE: 1
ABDOMINAL PAIN: 0
COUGH: 0
WHEEZING: 0

## 2019-07-26 ENCOUNTER — CARE COORDINATION (OUTPATIENT)
Dept: CARE COORDINATION | Age: 84
End: 2019-07-26

## 2019-07-26 NOTE — LETTER
7/26/2019     Asif Ervin  [address] [id number]    Dear Lu Chowdhury recently attempted to contact you to discuss your healthcare needs. My name is Ella Lr and I am a registered nurse who partners with Denisa Leone MD to improve patients health. As a member of your health care team, I would work with other providers involved in your care, offer education for your specific health conditions, and connect you with additional resources as needed. I will collaborate with Lorin Ramirez MD to support you in following your treatment plan. The additional support I provide is no additional cost to you. My primary focus is to help you achieve specific goals and improve your health. I look forward to working with you. Please contact me at your earliest convenience at [phone].     In good health,    Ella Lr RN

## 2019-07-30 ENCOUNTER — OFFICE VISIT (OUTPATIENT)
Dept: FAMILY MEDICINE CLINIC | Age: 84
End: 2019-07-30
Payer: MEDICARE

## 2019-07-30 ENCOUNTER — HOSPITAL ENCOUNTER (OUTPATIENT)
Age: 84
Setting detail: SPECIMEN
Discharge: HOME | End: 2019-07-30
Payer: MEDICARE

## 2019-07-30 VITALS
WEIGHT: 270 LBS | HEART RATE: 90 BPM | DIASTOLIC BLOOD PRESSURE: 80 MMHG | BODY MASS INDEX: 34.65 KG/M2 | SYSTOLIC BLOOD PRESSURE: 138 MMHG | HEIGHT: 74 IN | OXYGEN SATURATION: 98 %

## 2019-07-30 DIAGNOSIS — K74.60 CIRRHOSIS OF LIVER WITHOUT ASCITES, UNSPECIFIED HEPATIC CIRRHOSIS TYPE (HCC): ICD-10-CM

## 2019-07-30 DIAGNOSIS — I50.9 CONGESTIVE HEART FAILURE, UNSPECIFIED HF CHRONICITY, UNSPECIFIED HEART FAILURE TYPE (HCC): ICD-10-CM

## 2019-07-30 DIAGNOSIS — E03.9 HYPOTHYROIDISM, UNSPECIFIED TYPE: ICD-10-CM

## 2019-07-30 DIAGNOSIS — E11.9 TYPE 2 DIABETES MELLITUS WITHOUT COMPLICATION, WITHOUT LONG-TERM CURRENT USE OF INSULIN (HCC): ICD-10-CM

## 2019-07-30 DIAGNOSIS — M79.89 LEG SWELLING: Primary | ICD-10-CM

## 2019-07-30 LAB — TSH SERPL DL<=0.05 MIU/L-ACNC: 5.5 UIU/ML (ref 0.44–3.86)

## 2019-07-30 PROCEDURE — G8417 CALC BMI ABV UP PARAM F/U: HCPCS | Performed by: FAMILY MEDICINE

## 2019-07-30 PROCEDURE — 1123F ACP DISCUSS/DSCN MKR DOCD: CPT | Performed by: FAMILY MEDICINE

## 2019-07-30 PROCEDURE — G8427 DOCREV CUR MEDS BY ELIG CLIN: HCPCS | Performed by: FAMILY MEDICINE

## 2019-07-30 PROCEDURE — 1111F DSCHRG MED/CURRENT MED MERGE: CPT | Performed by: FAMILY MEDICINE

## 2019-07-30 PROCEDURE — 99214 OFFICE O/P EST MOD 30 MIN: CPT | Performed by: FAMILY MEDICINE

## 2019-07-30 PROCEDURE — 84443 ASSAY THYROID STIM HORMONE: CPT

## 2019-07-30 PROCEDURE — 4040F PNEUMOC VAC/ADMIN/RCVD: CPT | Performed by: FAMILY MEDICINE

## 2019-07-30 PROCEDURE — 1036F TOBACCO NON-USER: CPT | Performed by: FAMILY MEDICINE

## 2019-07-30 RX ORDER — METOLAZONE 5 MG/1
5 TABLET ORAL DAILY
Qty: 30 TABLET | Refills: 3 | Status: SHIPPED | OUTPATIENT
Start: 2019-07-30 | End: 2019-08-26 | Stop reason: SDUPTHER

## 2019-07-30 ASSESSMENT — ENCOUNTER SYMPTOMS
DIARRHEA: 0
WHEEZING: 0
CONSTIPATION: 0
SHORTNESS OF BREATH: 0
ABDOMINAL PAIN: 0
SORE THROAT: 0
COUGH: 0
RHINORRHEA: 0

## 2019-07-30 NOTE — PROGRESS NOTES
[address] PRIMARY CARE  [address] [place] [id number]  Dept: [phone]  Dept Fax: [phone]  Loc: [phone]   Chief Complaint  Chief Complaint   Patient presents with    Leg Swelling     patients daughter wrote note stating that fluid still stays on legs. Patient on 40 mg Furosemide. Patient has blisters on right leg, leg bandaged. HPI:  82 y.o.male who presents for leg swelling:    Leg swelling: hx of CHF, cirrhosis, DVT (on xarelto), recent hospitalization for RLE cellulitis, and obesity; started noticing blisters over the past week. No pain. Breathing is comfortable. His family is concerned about the continued swelling; Has had to apply additional ACE to the RLE to control it (in addition to 2 layers of tubigrip). Interested in restarting the metolazone. Already on 40mg lasix. His daughter is interested in putting him back on the metolazone which was stopped recently in the hospital. Feels the leg swelling if worse now. Past Medical History:   Diagnosis Date    Atrial fibrillation ([address]     Carcinoma of ascending colon ([facility] 2014    CHF (congestive heart failure) (HCC)     Chronic atrial fibrillation ([facility] 3/3/2016    Dementia     Foot ulcer, left ([facility] 3/3/2016    Hyperlipidemia     Hypertension     Hypothyroidism     Type II or unspecified type diabetes mellitus without mention of complication, not stated as uncontrolled     diet     Past Surgical History:   Procedure Laterality Date    COLONOSCOPY  5/9/14    bx     OTHER SURGICAL HISTORY  7/29/14    Right colectomy with primary anastomosis and liver bx    RIGHT COLECTOMY  07/29/14    ANDMARK    UPPER GASTROINTESTINAL ENDOSCOPY  5/9/14    bx beatrizmoszusalvador     Social History     Socioeconomic History    Marital status:       Spouse name: Not on file    Number of children: Not on file    Years of education: Not on file    Highest education chloride (KLOR-CON M) 20 MEQ extended release tablet Take 3 tablets by mouth daily 225 tablet 3    levothyroxine (SYNTHROID) 112 MCG tablet TAKE 1 TABLET BY MOUTH DAILY. 90 tablet 3    fenofibrate 160 MG tablet Take 1 tablet by mouth daily Note to pharmacy: Please dispense generic fenofibrate unless prescriber denotes ELDA 90 tablet 3    nisoldipine (SULAR) 8.5 MG extended release tablet TAKE 1 TABLET BY MOUTH EVERY DAY 90 tablet 3    metoprolol tartrate (LOPRESSOR) 25 MG tablet Take 0.5 tablets by mouth 2 times daily 60 tablet 3    omeprazole (PRILOSEC) 20 MG delayed release capsule Take 1 capsule by mouth daily 90 capsule 3    Misc. Devices MISC One lift chair, to be used daily Diagnosis CHF, obesity 1 each 0    Lift Chair MISC by Does not apply route 1 each [address] by Does not apply route 1 each 0    ferrous sulfate 325 (65 FE) MG tablet Take 1 tablet by mouth 2 times daily 180 tablet 3    SILVERSEAL HYDROGEL DRESSING 4\"X5\" PADS Apply 1 Units topically daily 20 each 2    Incontinence Supply Disposable (ATTENDS BARIATRIC BRIEF XXL) MISC Use around the clock as needed 2 Bottle 0    aspirin 81 MG tablet Take 81 mg by mouth daily. No current facility-administered medications for this visit. ROS:  Review of Systems   Constitutional: Negative for chills and fever. HENT: Negative for rhinorrhea and sore throat. Respiratory: Negative for cough, shortness of breath and wheezing. Cardiovascular: Positive for leg swelling. Negative for chest pain and palpitations. Gastrointestinal: Negative for abdominal pain, constipation and diarrhea. Endocrine: Negative for polydipsia and polyuria. Genitourinary: Negative for dysuria, frequency and urgency. Neurological: Negative for syncope, light-headedness, numbness and headaches. Psychiatric/Behavioral: Negative for sleep disturbance. The patient is not nervous/anxious.         Vitals:    07/30/19 0929   BP: 138/80   Site: Right

## 2019-07-31 DIAGNOSIS — E03.9 HYPOTHYROIDISM, UNSPECIFIED TYPE: Primary | ICD-10-CM

## 2019-07-31 RX ORDER — LEVOTHYROXINE SODIUM 0.12 MG/1
125 TABLET ORAL DAILY
Qty: 30 TABLET | Refills: 5 | Status: SHIPPED | OUTPATIENT
Start: 2019-07-31 | End: 2019-11-03 | Stop reason: SDUPTHER

## 2019-08-02 ENCOUNTER — CARE COORDINATION (OUTPATIENT)
Dept: CARE COORDINATION | Age: 84
End: 2019-08-02

## 2019-08-12 DIAGNOSIS — I48.20 CHRONIC ATRIAL FIBRILLATION (HCC): ICD-10-CM

## 2019-08-12 DIAGNOSIS — I10 ESSENTIAL HYPERTENSION: Primary | ICD-10-CM

## 2019-08-12 DIAGNOSIS — K21.9 GASTROESOPHAGEAL REFLUX DISEASE WITHOUT ESOPHAGITIS: ICD-10-CM

## 2019-08-12 DIAGNOSIS — I50.9 CONGESTIVE HEART FAILURE, UNSPECIFIED HF CHRONICITY, UNSPECIFIED HEART FAILURE TYPE (HCC): ICD-10-CM

## 2019-08-12 RX ORDER — OMEPRAZOLE 20 MG/1
CAPSULE, DELAYED RELEASE ORAL
Qty: 90 CAPSULE | Refills: 3 | Status: SHIPPED | OUTPATIENT
Start: 2019-08-12

## 2019-08-13 RX ORDER — RIVAROXABAN 20 MG/1
TABLET, FILM COATED ORAL
Qty: 30 TABLET | Refills: 5 | Status: SHIPPED | OUTPATIENT
Start: 2019-08-13

## 2019-08-26 ENCOUNTER — OFFICE VISIT (OUTPATIENT)
Dept: CARDIOLOGY CLINIC | Age: 84
End: 2019-08-26
Payer: MEDICARE

## 2019-08-26 VITALS
RESPIRATION RATE: 18 BRPM | BODY MASS INDEX: 35.56 KG/M2 | OXYGEN SATURATION: 98 % | HEART RATE: 85 BPM | SYSTOLIC BLOOD PRESSURE: 136 MMHG | DIASTOLIC BLOOD PRESSURE: 84 MMHG | WEIGHT: 277 LBS

## 2019-08-26 DIAGNOSIS — I50.9 CONGESTIVE HEART FAILURE, UNSPECIFIED HF CHRONICITY, UNSPECIFIED HEART FAILURE TYPE (HCC): ICD-10-CM

## 2019-08-26 DIAGNOSIS — I49.5 SSS (SICK SINUS SYNDROME) (HCC): ICD-10-CM

## 2019-08-26 DIAGNOSIS — I48.20 CHRONIC ATRIAL FIBRILLATION (HCC): ICD-10-CM

## 2019-08-26 DIAGNOSIS — M79.89 LEG SWELLING: ICD-10-CM

## 2019-08-26 DIAGNOSIS — R00.1 BRADYCARDIA: ICD-10-CM

## 2019-08-26 DIAGNOSIS — I10 ESSENTIAL HYPERTENSION: Primary | ICD-10-CM

## 2019-08-26 DIAGNOSIS — Z95.0 PACEMAKER: ICD-10-CM

## 2019-08-26 PROCEDURE — 4040F PNEUMOC VAC/ADMIN/RCVD: CPT | Performed by: INTERNAL MEDICINE

## 2019-08-26 PROCEDURE — 1123F ACP DISCUSS/DSCN MKR DOCD: CPT | Performed by: INTERNAL MEDICINE

## 2019-08-26 PROCEDURE — G8417 CALC BMI ABV UP PARAM F/U: HCPCS | Performed by: INTERNAL MEDICINE

## 2019-08-26 PROCEDURE — 99214 OFFICE O/P EST MOD 30 MIN: CPT | Performed by: INTERNAL MEDICINE

## 2019-08-26 PROCEDURE — 1036F TOBACCO NON-USER: CPT | Performed by: INTERNAL MEDICINE

## 2019-08-26 PROCEDURE — G8427 DOCREV CUR MEDS BY ELIG CLIN: HCPCS | Performed by: INTERNAL MEDICINE

## 2019-08-26 RX ORDER — METOLAZONE 5 MG/1
5 TABLET ORAL DAILY
Qty: 30 TABLET | Refills: 3 | Status: SHIPPED | OUTPATIENT
Start: 2019-08-26

## 2019-08-26 ASSESSMENT — ENCOUNTER SYMPTOMS
NAUSEA: 0
BLOOD IN STOOL: 0
RESPIRATORY NEGATIVE: 1
COUGH: 0
SHORTNESS OF BREATH: 0
EYES NEGATIVE: 1
WHEEZING: 0
GASTROINTESTINAL NEGATIVE: 1
STRIDOR: 0
CHEST TIGHTNESS: 0

## 2019-08-26 NOTE — PROGRESS NOTES
LABALBU 3.6 07/07/2019    LABALBU 4.1 01/31/2012    CREATININE 0.84 07/10/2019    CALCIUM 8.0 07/10/2019    GFRAA >60.0 07/10/2019    LABGLOM >60.0 07/10/2019    GLUCOSE 76 07/10/2019    GLUCOSE 90 01/31/2012     Magnesium:    Lab Results   Component Value Date    MG 2.0 07/08/2019     TSH:  Lab Results   Component Value Date    TSH 5.500 (H) 07/30/2019       Patient Active Problem List   Diagnosis    Type 2 diabetes mellitus without complication ([facility]    Essential hypertension    Hyperlipidemia    Malignant neoplasm of ascending colon (HCC)    Cirrhosis (HCC)    Chronic atrial fibrillation (HCC)    Hypothyroidism    CHF (congestive heart failure) (HCC)    History of colon cancer    History of partial colectomy    Acute encephalopathy    Gait abnormality    VTE (venous thromboembolism)    Bradycardia    Pacemaker    [facility]    Cellulitis of right lower extremity    Leg swelling       There are no discontinued medications. Modified Medications    No medications on file       No orders of the defined types were placed in this encounter. Assessment/Plan:    1. Essential hypertension  Stable     2. Chronic atrial fibrillation (HCC)   [address] [facility] control    3. Congestive heart failure, unspecified HF chronicity, unspecified heart failure type ([facility])     - metOLazone (ZAROXOLYN) 5 MG tablet; Take 1 tablet by mouth daily  Dispense: 30 tablet; Refill: 3    4. Bradycardia       5. Pacemaker  Interrogate     6. SSS (sick sinus syndrome) ([facility])       7. Leg swelling  Stable now back on Metolazone. - metOLazone (ZAROXOLYN) 5 MG tablet; Take 1 tablet by mouth daily  Dispense: 30 tablet; Refill: 3     Recheck BMP     Counseling:  Heart Healthy Lifestyle, Low Salt Diet, Take Precautions to Prevent Falls and Walk Daily    Return in about 3 months (around 11/26/2019) for Cardiovascular care. .      Electronically signed by Adali Castro MD on 8/26/2019 at 1:13 PM

## 2019-09-13 ENCOUNTER — OFFICE VISIT (OUTPATIENT)
Dept: FAMILY MEDICINE CLINIC | Age: 84
End: 2019-09-13
Payer: MEDICARE

## 2019-09-13 VITALS
WEIGHT: 277 LBS | DIASTOLIC BLOOD PRESSURE: 70 MMHG | HEART RATE: 87 BPM | HEIGHT: 74 IN | BODY MASS INDEX: 35.55 KG/M2 | SYSTOLIC BLOOD PRESSURE: 128 MMHG | OXYGEN SATURATION: 98 %

## 2019-09-13 DIAGNOSIS — H61.23 BILATERAL IMPACTED CERUMEN: Primary | ICD-10-CM

## 2019-09-13 PROCEDURE — G8417 CALC BMI ABV UP PARAM F/U: HCPCS | Performed by: FAMILY MEDICINE

## 2019-09-13 PROCEDURE — 69209 REMOVE IMPACTED EAR WAX UNI: CPT | Performed by: FAMILY MEDICINE

## 2019-09-13 PROCEDURE — 99214 OFFICE O/P EST MOD 30 MIN: CPT | Performed by: FAMILY MEDICINE

## 2019-09-13 PROCEDURE — G8427 DOCREV CUR MEDS BY ELIG CLIN: HCPCS | Performed by: FAMILY MEDICINE

## 2019-09-13 PROCEDURE — 4040F PNEUMOC VAC/ADMIN/RCVD: CPT | Performed by: FAMILY MEDICINE

## 2019-09-13 PROCEDURE — 1036F TOBACCO NON-USER: CPT | Performed by: FAMILY MEDICINE

## 2019-09-13 PROCEDURE — 1123F ACP DISCUSS/DSCN MKR DOCD: CPT | Performed by: FAMILY MEDICINE

## 2019-09-13 ASSESSMENT — ENCOUNTER SYMPTOMS
SHORTNESS OF BREATH: 0
ABDOMINAL PAIN: 0
WHEEZING: 0
CONSTIPATION: 0
COUGH: 0
RHINORRHEA: 0
SORE THROAT: 0
DIARRHEA: 0

## 2019-09-13 NOTE — PROGRESS NOTES
[address] PRIMARY CARE  [place] [address] [address]  Dept: [phone]  Dept Fax: : [phone]   Chief Complaint  Chief Complaint   Patient presents with    Other     wax build up bilateral ears. HPI:  82 y.o.male who presents for cerumen impaction:    B/l cerumen impaction: this occurs sometimes; the hearing has been affected as his caretaker has to yell to speak to him more lately. He has been using b/l ear drops to soften the wax over the past 2 mo. Past Medical History:   Diagnosis Date    [facility] fibrillation ([facility]     Carcinoma of ascending colon ([facility] 2014    CHF (congestive heart failure) (HCC)     Chronic atrial fibrillation ([facility] 3/3/2016    Dementia     Foot ulcer, left ([facility] 3/3/2016    Hyperlipidemia     Hypertension     Hypothyroidism     Type II or unspecified type diabetes mellitus without mention of complication, not stated as uncontrolled     diet     Past Surgical History:   Procedure Laterality Date    COLONOSCOPY  5/9/14    bx     OTHER SURGICAL HISTORY  7/29/14    Right colectomy with primary anastomosis and liver bx    [facility]    UPPER GASTROINTESTINAL ENDOSCOPY  5/9/14    bx ike     Social History     Socioeconomic History    Marital status:       Spouse name: Not on file    Number of children: Not on file    Years of education: Not on file    Highest education level: Not on file   Occupational History    Not on file   Social Needs    Financial resource strain: Not on file    Food insecurity:     Worry: Not on file     Inability: Not on file    Transportation needs:     Medical: Not on file     Non-medical: Not on file   Tobacco Use    Smoking status: Never Smoker    Smokeless tobacco: Never Used   Substance and Sexual Activity    Alcohol use: No    Drug use: No    Sexual activity: Never   Lifestyle   

## 2019-09-17 ENCOUNTER — HOSPITAL ENCOUNTER (EMERGENCY)
Age: 84
Discharge: HOME | End: 2019-09-17
Attending: EMERGENCY MEDICINE
Payer: MEDICARE

## 2019-09-17 VITALS
WEIGHT: 280 LBS | SYSTOLIC BLOOD PRESSURE: 137 MMHG | HEIGHT: 72 IN | TEMPERATURE: 99 F | HEART RATE: 71 BPM | DIASTOLIC BLOOD PRESSURE: 62 MMHG | BODY MASS INDEX: 37.93 KG/M2 | OXYGEN SATURATION: 96 % | RESPIRATION RATE: 18 BRPM

## 2019-09-17 DIAGNOSIS — K06.8 BLEEDING GUMS: ICD-10-CM

## 2019-09-17 DIAGNOSIS — F41.1 ANXIETY STATE: ICD-10-CM

## 2019-09-17 DIAGNOSIS — K05.10 GINGIVITIS: Primary | ICD-10-CM

## 2019-09-17 LAB
ANION GAP SERPL CALCULATED.3IONS-SCNC: 12 MEQ/L (ref 9–15)
BASOPHILS ABSOLUTE: 0 K/UL (ref 0–0.2)
BASOPHILS RELATIVE PERCENT: 0.3 %
BUN BLDV-MCNC: 28 MG/DL (ref 8–23)
CALCIUM SERPL-MCNC: 8.7 MG/DL (ref 8.5–9.9)
CHLORIDE BLD-SCNC: 93 MEQ/L (ref 95–107)
CO2: 31 MEQ/L (ref 20–31)
CREAT SERPL-MCNC: 1.03 MG/DL (ref 0.7–1.2)
EOSINOPHILS ABSOLUTE: 0.1 K/UL (ref 0–0.7)
EOSINOPHILS RELATIVE PERCENT: 1.7 %
GFR AFRICAN AMERICAN: >60
GFR NON-AFRICAN AMERICAN: >60
GLUCOSE BLD-MCNC: 149 MG/DL (ref 70–99)
HCT VFR BLD CALC: 39.9 % (ref 42–52)
HEMOGLOBIN: 13.4 G/DL (ref 14–18)
INR BLD: 2.1
LYMPHOCYTES ABSOLUTE: 0.8 K/UL (ref 1–4.8)
LYMPHOCYTES RELATIVE PERCENT: 10.3 %
MCH RBC QN AUTO: 31.7 PG (ref 27–31.3)
MCHC RBC AUTO-ENTMCNC: 33.6 % (ref 33–37)
MCV RBC AUTO: 94.5 FL (ref 80–100)
MONOCYTES ABSOLUTE: 0.5 K/UL (ref 0.2–0.8)
MONOCYTES RELATIVE PERCENT: 6.8 %
NEUTROPHILS ABSOLUTE: 6.3 K/UL (ref 1.4–6.5)
NEUTROPHILS RELATIVE PERCENT: 80.9 %
PDW BLD-RTO: 14.9 % (ref 11.5–14.5)
PLATELET # BLD: 214 K/UL (ref 130–400)
POTASSIUM SERPL-SCNC: 5.4 MEQ/L (ref 3.4–4.9)
PROTHROMBIN TIME: 24.2 SEC (ref 12.3–14.9)
RBC # BLD: 4.23 M/UL (ref 4.7–6.1)
SODIUM BLD-SCNC: 136 MEQ/L (ref 135–144)
WBC # BLD: 7.7 K/UL (ref 4.8–10.8)

## 2019-09-17 PROCEDURE — 85610 PROTHROMBIN TIME: CPT

## 2019-09-17 PROCEDURE — 99283 EMERGENCY DEPT VISIT LOW MDM: CPT

## 2019-09-17 PROCEDURE — 80048 BASIC METABOLIC PNL TOTAL CA: CPT

## 2019-09-17 PROCEDURE — 85025 COMPLETE CBC W/AUTO DIFF WBC: CPT

## 2019-09-17 RX ORDER — AMOXICILLIN AND CLAVULANATE POTASSIUM 875; 125 MG/1; MG/1
1 TABLET, FILM COATED ORAL 2 TIMES DAILY
Qty: 20 TABLET | Refills: 0 | Status: SHIPPED | OUTPATIENT
Start: 2019-09-17 | End: 2019-09-27

## 2019-09-18 NOTE — ED PROVIDER NOTES
([facility]     Chronic atrial fibrillation ([facility] 3/3/2016    Dementia     Foot ulcer, left ([facility] 3/3/2016    Hyperlipidemia     Hypertension     Hypothyroidism     Type II or unspecified type diabetes mellitus without mention of complication, not stated as uncontrolled     diet         SURGICALHISTORY       Past Surgical History:   Procedure Laterality Date    COLONOSCOPY  5/9/14    bx     OTHER SURGICAL HISTORY  7/29/14    Right colectomy with primary anastomosis and liver bx    RIGHT COLECTOMY  07/29/14    ANDRASKO    UPPER GASTROINTESTINAL ENDOSCOPY  5/9/14    bx beatrizmosshayy         CURRENT MEDICATIONS       Previous Medications    ASPIRIN 81 MG TABLET    Take 81 mg by mouth daily. FENOFIBRATE 160 MG TABLET    Take 1 tablet by mouth daily Note to pharmacy: Please dispense generic fenofibrate unless prescriber denotes ELDA    FERROUS SULFATE 325 (65 FE) MG TABLET    Take 1 tablet by mouth 2 times daily    FUROSEMIDE (LASIX) 40 MG TABLET    TAKE 1 TABLET BY MOUTH DAILY    HOSPITAL BED MISC    by Does not apply route    INCONTINENCE SUPPLY DISPOSABLE (ATTENDS BARIATRIC BRIEF XXL) MISC    Use around the clock as needed    IPRATROPIUM-ALBUTEROL (DUONEB) 0.5-2.5 (3) MG/3ML SOLN NEBULIZER SOLUTION    Inhale 3 mLs into the lungs every 6 hours as needed for Shortness of Breath    LEVOTHYROXINE (SYNTHROID) 125 MCG TABLET    Take 1 tablet by mouth daily    LIFT CHAIR MISC    by Does not apply route    LOVASTATIN (MEVACOR) 20 MG TABLET    TAKE 1 TABLET BY MOUTH NIGHTLY    METOLAZONE (ZAROXOLYN) 5 MG TABLET    Take 1 tablet by mouth daily    METOPROLOL TARTRATE (LOPRESSOR) 25 MG TABLET    take ONE-HALF tablet 2 (TWO) times a day    MISC.  DEVICES MISC    One lift chair, to be used daily Diagnosis CHF, obesity    NISOLDIPINE (SULAR) 8.5 MG EXTENDED RELEASE TABLET    TAKE 1 TABLET BY MOUTH EVERY DAY    OMEPRAZOLE (PRILOSEC) 20 MG DELAYED RELEASE CAPSULE    Take 1 capsule by mouth daily    POTASSIUM CHLORIDE (KLOR-CON M) 20

## 2019-09-27 ENCOUNTER — TELEPHONE (OUTPATIENT)
Dept: FAMILY MEDICINE CLINIC | Age: 84
End: 2019-09-27

## 2019-09-27 DIAGNOSIS — I50.9 CONGESTIVE HEART FAILURE, UNSPECIFIED HF CHRONICITY, UNSPECIFIED HEART FAILURE TYPE (HCC): Primary | ICD-10-CM

## 2019-10-03 DIAGNOSIS — I48.20 CHRONIC ATRIAL FIBRILLATION (HCC): ICD-10-CM

## 2019-10-03 DIAGNOSIS — I10 ESSENTIAL HYPERTENSION: Primary | ICD-10-CM

## 2019-10-03 DIAGNOSIS — I50.9 CONGESTIVE HEART FAILURE, UNSPECIFIED HF CHRONICITY, UNSPECIFIED HEART FAILURE TYPE (HCC): ICD-10-CM

## 2019-10-04 ENCOUNTER — OFFICE VISIT (OUTPATIENT)
Dept: CARDIOLOGY CLINIC | Age: 84
End: 2019-10-04
Payer: MEDICARE

## 2019-10-04 ENCOUNTER — TELEPHONE (OUTPATIENT)
Dept: CARDIOLOGY CLINIC | Age: 84
End: 2019-10-04

## 2019-10-04 VITALS
BODY MASS INDEX: 37.92 KG/M2 | WEIGHT: 279.6 LBS | RESPIRATION RATE: 18 BRPM | OXYGEN SATURATION: 94 % | DIASTOLIC BLOOD PRESSURE: 66 MMHG | SYSTOLIC BLOOD PRESSURE: 120 MMHG | HEART RATE: 81 BPM

## 2019-10-04 DIAGNOSIS — E87.6 HYPOKALEMIA: ICD-10-CM

## 2019-10-04 DIAGNOSIS — I10 ESSENTIAL HYPERTENSION: ICD-10-CM

## 2019-10-04 DIAGNOSIS — E78.5 HYPERLIPIDEMIA, UNSPECIFIED HYPERLIPIDEMIA TYPE: ICD-10-CM

## 2019-10-04 DIAGNOSIS — I50.9 CONGESTIVE HEART FAILURE, UNSPECIFIED HF CHRONICITY, UNSPECIFIED HEART FAILURE TYPE (HCC): ICD-10-CM

## 2019-10-04 DIAGNOSIS — I48.20 CHRONIC ATRIAL FIBRILLATION (HCC): ICD-10-CM

## 2019-10-04 DIAGNOSIS — I10 ESSENTIAL HYPERTENSION: Primary | ICD-10-CM

## 2019-10-04 LAB
ANION GAP SERPL CALCULATED.3IONS-SCNC: 11 MEQ/L (ref 9–15)
BUN BLDV-MCNC: 21 MG/DL (ref 8–23)
CALCIUM SERPL-MCNC: 8.6 MG/DL (ref 8.5–9.9)
CHLORIDE BLD-SCNC: 97 MEQ/L (ref 95–107)
CO2: 30 MEQ/L (ref 20–31)
CREAT SERPL-MCNC: 0.91 MG/DL (ref 0.7–1.2)
GFR AFRICAN AMERICAN: >60
GFR NON-AFRICAN AMERICAN: >60
GLUCOSE BLD-MCNC: 81 MG/DL (ref 70–99)
POTASSIUM SERPL-SCNC: 2.9 MEQ/L (ref 3.4–4.9)
SODIUM BLD-SCNC: 138 MEQ/L (ref 135–144)

## 2019-10-04 PROCEDURE — 1123F ACP DISCUSS/DSCN MKR DOCD: CPT | Performed by: INTERNAL MEDICINE

## 2019-10-04 PROCEDURE — G8427 DOCREV CUR MEDS BY ELIG CLIN: HCPCS | Performed by: INTERNAL MEDICINE

## 2019-10-04 PROCEDURE — G8484 FLU IMMUNIZE NO ADMIN: HCPCS | Performed by: INTERNAL MEDICINE

## 2019-10-04 PROCEDURE — 99214 OFFICE O/P EST MOD 30 MIN: CPT | Performed by: INTERNAL MEDICINE

## 2019-10-04 PROCEDURE — G8417 CALC BMI ABV UP PARAM F/U: HCPCS | Performed by: INTERNAL MEDICINE

## 2019-10-04 PROCEDURE — 1036F TOBACCO NON-USER: CPT | Performed by: INTERNAL MEDICINE

## 2019-10-04 PROCEDURE — 4040F PNEUMOC VAC/ADMIN/RCVD: CPT | Performed by: INTERNAL MEDICINE

## 2019-10-04 RX ORDER — POTASSIUM CHLORIDE 20 MEQ/1
120 TABLET, EXTENDED RELEASE ORAL DAILY
Qty: 225 TABLET | Refills: 3
Start: 2019-10-04 | End: 2019-10-14 | Stop reason: SDUPTHER

## 2019-10-04 ASSESSMENT — ENCOUNTER SYMPTOMS
NAUSEA: 0
SHORTNESS OF BREATH: 0
COUGH: 0
CHEST TIGHTNESS: 0
RESPIRATORY NEGATIVE: 1
STRIDOR: 0
WHEEZING: 0
GASTROINTESTINAL NEGATIVE: 1
EYES NEGATIVE: 1
BLOOD IN STOOL: 0

## 2019-10-11 ENCOUNTER — HOSPITAL ENCOUNTER (OUTPATIENT)
Dept: LAB | Age: 84
Discharge: HOME | End: 2019-10-11
Payer: MEDICARE

## 2019-10-11 ENCOUNTER — TELEPHONE (OUTPATIENT)
Dept: FAMILY MEDICINE CLINIC | Age: 84
End: 2019-10-11

## 2019-10-11 DIAGNOSIS — R26.9 GAIT ABNORMALITY: Primary | ICD-10-CM

## 2019-10-11 DIAGNOSIS — I50.9 CONGESTIVE HEART FAILURE, UNSPECIFIED HF CHRONICITY, UNSPECIFIED HEART FAILURE TYPE (HCC): ICD-10-CM

## 2019-10-11 LAB
ANION GAP SERPL CALCULATED.3IONS-SCNC: 17 MEQ/L (ref 9–15)
BUN BLDV-MCNC: 24 MG/DL (ref 8–23)
CALCIUM SERPL-MCNC: 8.9 MG/DL (ref 8.5–9.9)
CHLORIDE BLD-SCNC: 96 MEQ/L (ref 95–107)
CO2: 26 MEQ/L (ref 20–31)
CREAT SERPL-MCNC: 0.86 MG/DL (ref 0.7–1.2)
GFR AFRICAN AMERICAN: >60
GFR NON-AFRICAN AMERICAN: >60
GLUCOSE BLD-MCNC: 73 MG/DL (ref 70–99)
POTASSIUM SERPL-SCNC: 3.3 MEQ/L (ref 3.4–4.9)
SODIUM BLD-SCNC: 139 MEQ/L (ref 135–144)

## 2019-10-11 PROCEDURE — 36415 COLL VENOUS BLD VENIPUNCTURE: CPT

## 2019-10-11 PROCEDURE — 80048 BASIC METABOLIC PNL TOTAL CA: CPT

## 2019-10-14 ENCOUNTER — TELEPHONE (OUTPATIENT)
Dept: CARDIOLOGY CLINIC | Age: 84
End: 2019-10-14

## 2019-10-14 DIAGNOSIS — E87.6 HYPOKALEMIA: ICD-10-CM

## 2019-10-14 DIAGNOSIS — I48.20 CHRONIC ATRIAL FIBRILLATION (HCC): ICD-10-CM

## 2019-10-14 DIAGNOSIS — E87.6 HYPOKALEMIA: Primary | ICD-10-CM

## 2019-10-14 DIAGNOSIS — I10 ESSENTIAL HYPERTENSION: ICD-10-CM

## 2019-10-14 DIAGNOSIS — I50.9 CONGESTIVE HEART FAILURE, UNSPECIFIED HF CHRONICITY, UNSPECIFIED HEART FAILURE TYPE (HCC): ICD-10-CM

## 2019-10-14 RX ORDER — SPIRONOLACTONE 50 MG/1
50 TABLET, FILM COATED ORAL DAILY
Qty: 90 TABLET | Refills: 2 | Status: SHIPPED | OUTPATIENT
Start: 2019-10-14

## 2019-10-14 RX ORDER — POTASSIUM CHLORIDE 20 MEQ/1
120 TABLET, EXTENDED RELEASE ORAL DAILY
Qty: 225 TABLET | Refills: 3 | Status: SHIPPED | OUTPATIENT
Start: 2019-10-14

## 2019-10-21 ENCOUNTER — TELEPHONE (OUTPATIENT)
Dept: FAMILY MEDICINE CLINIC | Age: 84
End: 2019-10-21

## 2019-10-21 DIAGNOSIS — R26.9 GAIT ABNORMALITY: ICD-10-CM

## 2019-10-21 DIAGNOSIS — R26.81 UNSTEADY GAIT: Primary | ICD-10-CM

## 2019-10-24 ENCOUNTER — HOSPITAL ENCOUNTER (OUTPATIENT)
Dept: LAB | Age: 84
Discharge: HOME | End: 2019-10-24
Payer: MEDICARE

## 2019-10-24 DIAGNOSIS — E87.6 HYPOKALEMIA: ICD-10-CM

## 2019-10-24 DIAGNOSIS — I50.9 CONGESTIVE HEART FAILURE, UNSPECIFIED HF CHRONICITY, UNSPECIFIED HEART FAILURE TYPE (HCC): ICD-10-CM

## 2019-10-24 DIAGNOSIS — I48.20 CHRONIC ATRIAL FIBRILLATION (HCC): ICD-10-CM

## 2019-10-24 DIAGNOSIS — I10 ESSENTIAL HYPERTENSION: ICD-10-CM

## 2019-10-24 LAB
ANION GAP SERPL CALCULATED.3IONS-SCNC: 16 MEQ/L (ref 9–15)
BUN BLDV-MCNC: 30 MG/DL (ref 8–23)
CALCIUM SERPL-MCNC: 9.3 MG/DL (ref 8.5–9.9)
CHLORIDE BLD-SCNC: 96 MEQ/L (ref 95–107)
CO2: 25 MEQ/L (ref 20–31)
CREAT SERPL-MCNC: 1.03 MG/DL (ref 0.7–1.2)
GFR AFRICAN AMERICAN: >60
GFR NON-AFRICAN AMERICAN: >60
GLUCOSE BLD-MCNC: 107 MG/DL (ref 70–99)
POTASSIUM SERPL-SCNC: 4 MEQ/L (ref 3.4–4.9)
SODIUM BLD-SCNC: 137 MEQ/L (ref 135–144)

## 2019-10-24 PROCEDURE — 36415 COLL VENOUS BLD VENIPUNCTURE: CPT

## 2019-10-24 PROCEDURE — 80048 BASIC METABOLIC PNL TOTAL CA: CPT

## 2019-10-30 ENCOUNTER — APPOINTMENT (OUTPATIENT)
Dept: CT IMAGING | Age: 84
End: 2019-10-30
Payer: MEDICARE

## 2019-10-30 ENCOUNTER — HOSPITAL ENCOUNTER (EMERGENCY)
Age: 84
Discharge: HOME | End: 2019-10-31
Attending: EMERGENCY MEDICINE
Payer: MEDICARE

## 2019-10-30 VITALS
HEART RATE: 84 BPM | TEMPERATURE: 97.5 F | HEIGHT: 74 IN | BODY MASS INDEX: 35.81 KG/M2 | OXYGEN SATURATION: 97 % | RESPIRATION RATE: 20 BRPM | WEIGHT: 279 LBS | SYSTOLIC BLOOD PRESSURE: 139 MMHG | DIASTOLIC BLOOD PRESSURE: 66 MMHG
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
## 2019-10-30 DIAGNOSIS — S09.90XA CLOSED HEAD INJURY, INITIAL ENCOUNTER: ICD-10-CM

## 2019-10-30 PROCEDURE — 99283 EMERGENCY DEPT VISIT LOW MDM: CPT

## 2019-10-30 PROCEDURE — 70450 CT HEAD/BRAIN W/O DYE: CPT

## 2019-10-30 ASSESSMENT — ENCOUNTER SYMPTOMS
EYE PAIN: 0
DIARRHEA: 0
TROUBLE SWALLOWING: 0
WHEEZING: 0
VOMITING: 0
COUGH: 0
BLOOD IN STOOL: 0
EYE REDNESS: 0
CONSTIPATION: 0
EYE DISCHARGE: 0
BACK PAIN: 0
SHORTNESS OF BREATH: 0
CHEST TIGHTNESS: 0
SORE THROAT: 0
CHOKING: 0
STRIDOR: 0
VOICE CHANGE: 0
FACIAL SWELLING: 0
SINUS PRESSURE: 0
ABDOMINAL PAIN: 0

## 2019-10-31 ENCOUNTER — TELEPHONE (OUTPATIENT)
Dept: FAMILY MEDICINE CLINIC | Age: 84
End: 2019-10-31

## 2019-11-02 ENCOUNTER — APPOINTMENT (OUTPATIENT)
Dept: CT IMAGING | Age: 84
End: 2019-11-02
Payer: MEDICARE

## 2019-11-02 ENCOUNTER — APPOINTMENT (OUTPATIENT)
Dept: GENERAL RADIOLOGY | Age: 84
End: 2019-11-02
Payer: MEDICARE

## 2019-11-02 ENCOUNTER — HOSPITAL ENCOUNTER (EMERGENCY)
Age: 84
Discharge: ANOTHER HOSPITAL | End: 2019-11-02
Attending: EMERGENCY MEDICINE
Payer: MEDICARE

## 2019-11-02 VITALS
BODY MASS INDEX: 35.95 KG/M2 | WEIGHT: 280 LBS | OXYGEN SATURATION: 95 % | HEART RATE: 64 BPM | TEMPERATURE: 97.9 F | DIASTOLIC BLOOD PRESSURE: 84 MMHG | RESPIRATION RATE: 16 BRPM | SYSTOLIC BLOOD PRESSURE: 152 MMHG

## 2019-11-02 DIAGNOSIS — S06.5X0D TRAUMATIC SUBDURAL HEMORRHAGE WITHOUT LOSS OF CONSCIOUSNESS, SUBSEQUENT ENCOUNTER: Primary | ICD-10-CM

## 2019-11-02 DIAGNOSIS — J96.00 ACUTE RESPIRATORY FAILURE, UNSPECIFIED WHETHER WITH HYPOXIA OR HYPERCAPNIA (HCC): ICD-10-CM

## 2019-11-02 LAB
ALBUMIN SERPL-MCNC: 3.8 G/DL (ref 3.5–4.6)
ALP BLD-CCNC: 83 U/L (ref 35–104)
ALT SERPL-CCNC: 18 U/L (ref 0–41)
ANION GAP SERPL CALCULATED.3IONS-SCNC: 19 MEQ/L (ref 9–15)
APTT: 36.9 SEC (ref 24.4–36.8)
AST SERPL-CCNC: 66 U/L (ref 0–40)
BASOPHILS ABSOLUTE: 0 K/UL (ref 0–0.2)
BASOPHILS RELATIVE PERCENT: 0.2 %
BILIRUB SERPL-MCNC: 3.1 MG/DL (ref 0.2–0.7)
BILIRUBIN URINE: NEGATIVE
BLOOD, URINE: NEGATIVE
BUN BLDV-MCNC: 35 MG/DL (ref 8–23)
CALCIUM SERPL-MCNC: 9.2 MG/DL (ref 8.5–9.9)
CHLORIDE BLD-SCNC: 95 MEQ/L (ref 95–107)
CLARITY: CLEAR
CO2: 18 MEQ/L (ref 20–31)
COLOR: YELLOW
CREAT SERPL-MCNC: 0.62 MG/DL (ref 0.7–1.2)
EKG ATRIAL RATE: 72 BPM
EKG P AXIS: 102 DEGREES
EKG P-R INTERVAL: 160 MS
EKG Q-T INTERVAL: 442 MS
EKG QRS DURATION: 78 MS
EKG QTC CALCULATION (BAZETT): 483 MS
EKG R AXIS: 58 DEGREES
EKG T AXIS: 266 DEGREES
EKG VENTRICULAR RATE: 72 BPM
EOSINOPHILS ABSOLUTE: 0 K/UL (ref 0–0.7)
EOSINOPHILS RELATIVE PERCENT: 0.1 %
GFR AFRICAN AMERICAN: >60
GFR NON-AFRICAN AMERICAN: >60
GLOBULIN: 4 G/DL (ref 2.3–3.5)
GLUCOSE BLD-MCNC: 196 MG/DL (ref 70–99)
GLUCOSE BLD-MCNC: 204 MG/DL (ref 60–115)
GLUCOSE URINE: NEGATIVE MG/DL
HCT VFR BLD CALC: 42.1 % (ref 42–52)
HEMOGLOBIN: 14.2 G/DL (ref 14–18)
INR BLD: 2
KETONES, URINE: NEGATIVE MG/DL
LACTIC ACID: 2.8 MMOL/L (ref 0.5–2.2)
LEUKOCYTE ESTERASE, URINE: NEGATIVE
LIPASE: 23 U/L (ref 12–95)
LYMPHOCYTES ABSOLUTE: 0.6 K/UL (ref 1–4.8)
LYMPHOCYTES RELATIVE PERCENT: 3.8 %
MAGNESIUM: 1.9 MG/DL (ref 1.7–2.4)
MCH RBC QN AUTO: 31.8 PG (ref 27–31.3)
MCHC RBC AUTO-ENTMCNC: 33.7 % (ref 33–37)
MCV RBC AUTO: 94.2 FL (ref 80–100)
MONOCYTES ABSOLUTE: 0.6 K/UL (ref 0.2–0.8)
MONOCYTES RELATIVE PERCENT: 3.8 %
NEUTROPHILS ABSOLUTE: 15.3 K/UL (ref 1.4–6.5)
NEUTROPHILS RELATIVE PERCENT: 92.1 %
NITRITE, URINE: NEGATIVE
PDW BLD-RTO: 15.1 % (ref 11.5–14.5)
PERFORMED ON: ABNORMAL
PH UA: 5 (ref 5–9)
PLATELET # BLD: 167 K/UL (ref 130–400)
POTASSIUM SERPL-SCNC: 4.5 MEQ/L (ref 3.4–4.9)
PROTEIN UA: NEGATIVE MG/DL
PROTHROMBIN TIME: 23.6 SEC (ref 12.3–14.9)
RBC # BLD: 4.47 M/UL (ref 4.7–6.1)
SODIUM BLD-SCNC: 132 MEQ/L (ref 135–144)
SPECIFIC GRAVITY UA: 1.02 (ref 1–1.03)
TOTAL PROTEIN: 7.8 G/DL (ref 6.3–8)
TROPONIN: <0.01 NG/ML (ref 0–0.01)
URINE REFLEX TO CULTURE: ABNORMAL
UROBILINOGEN, URINE: 2 E.U./DL
WBC # BLD: 16.6 K/UL (ref 4.8–10.8)

## 2019-11-02 PROCEDURE — 85025 COMPLETE CBC W/AUTO DIFF WBC: CPT

## 2019-11-02 PROCEDURE — 2500000003 HC RX 250 WO HCPCS: Performed by: EMERGENCY MEDICINE

## 2019-11-02 PROCEDURE — 71045 X-RAY EXAM CHEST 1 VIEW: CPT

## 2019-11-02 PROCEDURE — 96375 TX/PRO/DX INJ NEW DRUG ADDON: CPT

## 2019-11-02 PROCEDURE — 2580000003 HC RX 258: Performed by: EMERGENCY MEDICINE

## 2019-11-02 PROCEDURE — 83605 ASSAY OF LACTIC ACID: CPT

## 2019-11-02 PROCEDURE — 6360000002 HC RX W HCPCS: Performed by: EMERGENCY MEDICINE

## 2019-11-02 PROCEDURE — 87040 BLOOD CULTURE FOR BACTERIA: CPT

## 2019-11-02 PROCEDURE — 81003 URINALYSIS AUTO W/O SCOPE: CPT

## 2019-11-02 PROCEDURE — 83735 ASSAY OF MAGNESIUM: CPT

## 2019-11-02 PROCEDURE — 85730 THROMBOPLASTIN TIME PARTIAL: CPT

## 2019-11-02 PROCEDURE — 93005 ELECTROCARDIOGRAM TRACING: CPT

## 2019-11-02 PROCEDURE — 80053 COMPREHEN METABOLIC PANEL: CPT

## 2019-11-02 PROCEDURE — 84484 ASSAY OF TROPONIN QUANT: CPT

## 2019-11-02 PROCEDURE — 99285 EMERGENCY DEPT VISIT HI MDM: CPT

## 2019-11-02 PROCEDURE — 85610 PROTHROMBIN TIME: CPT

## 2019-11-02 PROCEDURE — 83690 ASSAY OF LIPASE: CPT

## 2019-11-02 PROCEDURE — 96365 THER/PROPH/DIAG IV INF INIT: CPT

## 2019-11-02 PROCEDURE — 70450 CT HEAD/BRAIN W/O DYE: CPT

## 2019-11-02 RX ORDER — ONDANSETRON 2 MG/ML
4 INJECTION INTRAMUSCULAR; INTRAVENOUS ONCE
Status: DISCONTINUED | OUTPATIENT
Start: 2019-11-02 | End: 2019-11-02 | Stop reason: HOSPADM

## 2019-11-02 RX ORDER — ROCURONIUM BROMIDE 10 MG/ML
130 INJECTION, SOLUTION INTRAVENOUS ONCE
Status: COMPLETED | OUTPATIENT
Start: 2019-11-02 | End: 2019-11-02

## 2019-11-02 RX ORDER — 0.9 % SODIUM CHLORIDE 0.9 %
1000 INTRAVENOUS SOLUTION INTRAVENOUS ONCE
Status: COMPLETED | OUTPATIENT
Start: 2019-11-02 | End: 2019-11-02

## 2019-11-02 RX ORDER — ETOMIDATE 2 MG/ML
40 INJECTION INTRAVENOUS ONCE
Status: COMPLETED | OUTPATIENT
Start: 2019-11-02 | End: 2019-11-02

## 2019-11-02 RX ORDER — FENTANYL CITRATE 50 UG/ML
50 INJECTION, SOLUTION INTRAMUSCULAR; INTRAVENOUS ONCE
Status: COMPLETED | OUTPATIENT
Start: 2019-11-02 | End: 2019-11-02

## 2019-11-02 RX ORDER — SODIUM CHLORIDE 9 MG/ML
INJECTION, SOLUTION INTRAVENOUS CONTINUOUS
Status: DISCONTINUED | OUTPATIENT
Start: 2019-11-02 | End: 2019-11-02 | Stop reason: HOSPADM

## 2019-11-02 RX ADMIN — ROCURONIUM BROMIDE 130 MG: 10 INJECTION INTRAVENOUS at 03:10

## 2019-11-02 RX ADMIN — FENTANYL CITRATE 50 MCG: 50 INJECTION INTRAMUSCULAR; INTRAVENOUS at 03:26

## 2019-11-02 RX ADMIN — ETOMIDATE 40 MG: 2 INJECTION, SOLUTION INTRAVENOUS at 03:26

## 2019-11-02 RX ADMIN — CEFTRIAXONE 1 G: 1 INJECTION, POWDER, FOR SOLUTION INTRAMUSCULAR; INTRAVENOUS at 02:17

## 2019-11-02 RX ADMIN — SODIUM CHLORIDE: 9 INJECTION, SOLUTION INTRAVENOUS at 01:05

## 2019-11-02 RX ADMIN — SODIUM CHLORIDE 1000 ML: 9 INJECTION, SOLUTION INTRAVENOUS at 03:27

## 2019-11-02 ASSESSMENT — ENCOUNTER SYMPTOMS
ABDOMINAL PAIN: 0
APNEA: 0
SORE THROAT: 0
NAUSEA: 0
COUGH: 0
ABDOMINAL DISTENTION: 0
VOMITING: 0
RHINORRHEA: 0
PHOTOPHOBIA: 0
COLOR CHANGE: 0
SINUS PRESSURE: 0
BACK PAIN: 0
EYE PAIN: 0
WHEEZING: 0
DIARRHEA: 0
CONSTIPATION: 0
SHORTNESS OF BREATH: 0

## 2019-11-02 ASSESSMENT — PAIN SCALES - GENERAL
PAINLEVEL_OUTOF10: 0
PAINLEVEL_OUTOF10: 0

## 2019-11-03 DIAGNOSIS — E03.9 HYPOTHYROIDISM, UNSPECIFIED TYPE: ICD-10-CM

## 2019-11-03 PROCEDURE — 93010 ELECTROCARDIOGRAM REPORT: CPT | Performed by: INTERNAL MEDICINE

## 2019-11-04 RX ORDER — LEVOTHYROXINE SODIUM 0.12 MG/1
125 TABLET ORAL DAILY
Qty: 30 TABLET | Refills: 5 | Status: SHIPPED | OUTPATIENT
Start: 2019-11-04

## 2019-11-07 LAB
BLOOD CULTURE, ROUTINE: NORMAL
CULTURE, BLOOD 2: NORMAL

## 2019-11-09 ASSESSMENT — ENCOUNTER SYMPTOMS
COLOR CHANGE: 0
PHOTOPHOBIA: 0
BACK PAIN: 0
CONSTIPATION: 0
VOMITING: 0
DIARRHEA: 0
NAUSEA: 0
RHINORRHEA: 0
ABDOMINAL PAIN: 0
SINUS PRESSURE: 0
APNEA: 0
EYE PAIN: 0
COUGH: 0
SORE THROAT: 0
ABDOMINAL DISTENTION: 0
SHORTNESS OF BREATH: 0
WHEEZING: 0

## 2019-11-11 DIAGNOSIS — I10 ESSENTIAL HYPERTENSION: ICD-10-CM

## 2019-11-11 DIAGNOSIS — E78.5 HYPERLIPIDEMIA, UNSPECIFIED HYPERLIPIDEMIA TYPE: ICD-10-CM

## 2019-11-11 RX ORDER — FENOFIBRATE 160 MG/1
TABLET ORAL
Qty: 90 TABLET | Refills: 3 | Status: SHIPPED | OUTPATIENT
Start: 2019-11-11

## 2019-11-11 RX ORDER — NISOLDIPINE 8.5 MG/1
TABLET, FILM COATED, EXTENDED RELEASE ORAL
Qty: 90 TABLET | Refills: 3 | Status: SHIPPED | OUTPATIENT
Start: 2019-11-11

## 2020-01-01 NOTE — ED PROVIDER NOTES
No answer. Message left to call the office back.    PASTMEDICAL HISTORY     Past Medical History:   Diagnosis Date    At[facility] fibrillation ([facility]     [facility]    CHF (congestive heart failure) (HCC)     [facility] 3/3/2016    Dementia     [facility] 3/[facility]/2016    Hyperlipidemia     Hypertension     Hypothyroidism     Type II or unspecified type diabetes mellitus without mention of complication, not stated as uncontrolled     diet         SURGICAL HISTORY       Past Surgical History:   Procedure Laterality Date    COLONOSCOPY  5/9/14    bx     OTHER SURGICAL HISTORY  7/29/14    Right colectomy with primary anastomosis and liver bx    [facility]    UPPER GASTROINTESTINAL ENDOSCOPY  5/9/14    bx [address] MEDICATIONS       Discharge Medication List as of 7/7/2019  6:41 PM      CONTINUE these medications which have NOT CHANGED    Details   Respiratory Therapy Supplies (NEBULIZER COMPRESSOR) KIT ONCE Starting Sun 6/23/2019, For 1 dose, Disp-1 kit, R-0, Print      ipratropium-albuterol (DUONEB) 0.5-2.5 (3) MG/3ML SOLN nebulizer solution Inhale 3 mLs into the lungs every 6 hours as needed for Shortness of Breath, Disp-360 mL, R-0Print      furosemide (LASIX) 40 MG tablet TAKE 1 TABLET BY MOUTH DAILY, Disp-90 tablet, R-3Normal      lovastatin (MEVACOR) 20 MG tablet TAKE 1 TABLET BY MOUTH NIGHTLY, Disp-90 tablet, R-3Normal      XARELTO 20 MG TABS tablet TAKE 1 TABLET BY MOUTH DAILY WITH BREAKFAST (Start on 1/31/19), Disp-30 tablet, R-5Normal      potassium chloride (KLOR-CON M) 20 MEQ extended release tablet Take 3 tablets by mouth daily, Disp-225 tablet, R-3Normal      levothyroxine (SYNTHROID) 112 MCG tablet TAKE 1 TABLET BY MOUTH DAILY. , Disp-90 tablet, R-3Normal      fenofibrate 160 MG tablet Take 1 tablet by mouth daily Note to pharmacy: Please dispense generic fenofibrate unless prescriber denotes ELDA, Disp-90 tablet, R-3Normal      nisoldipine None     Gets together: None     Attends Buddhist service: None     Active member of club or organization: None     Attends meetings of clubs or organizations: None     Relationship status: None    Intimate partner violence:     Fear of current or ex partner: None     Emotionally abused: None     Physically abused: None     Forced sexual activity: None   Other Topics Concern    None   Social History Narrative                           SCREENINGS             PHYSICAL EXAM    (up to 7 for level 4, 8 or more for level 5)     ED Triage Vitals [07/07/19 1624]   BP Temp Temp Source Pulse Resp SpO2 Height Weight   (!) 150/73 97.7 °F (36.5 °C) Oral 83 16 97 % 6' 2\" (1.88 m) 280 lb (127 kg)       Physical Exam  Physical Exam   Constitutional:  Appears well, happy, well-developed and well-nourished. No distress noted. Non toxic in appearance. Morbidly obese. HENT:     Head: Normocephalic and atraumatic. Mouth/Throat: Oropharynx is clear and mucosa moist. No oropharyngeal exudate noted. Posterior pharynx is pink and noninjected. Adentate  Eyes: Conjunctivae and EOM are normal. Pupils are equal,round, and reactive to light. No scleral icterus. Neck: Normal range of motion. Neck supple. No tracheal deviation present. Cardiovascular: Normal rate, regular rhythm, normal heartsounds and intact distal pulses including bilateral dorsal pedal.  Exam reveals no gallop or friction rub. No murmur heard. Pulmonary/Chest: Effort normal and breath sounds are symmetric and normal. No respiratory distress. There are no wheezes, rales or rhonchi. No tenderness is exhibited upon palpation of the chest wall. Abdominal: Soft and obese. Bowel sounds are normal. No distension or no mass exhibitted. There is no tenderness, rebound, rigidity or guarding. Genitourinary:   No CVA tenderness noted on examination. Musculoskeletal: Examination shows that the right calf is more swollen than the left. Normal range of motion.  There is follow-up provider specified. Final Impression:   1. Leg swelling New Problem   2. Cellulitis of right lower extremity    3. Onychomycosis    4. Noninfected skin tear of lower extremity, right, initial encounter    5. Essential hypertension    6. Hyperglycemia    7. Anticoagulation adequate    8. Peripheral vascular disease of lower extremity ([facility]               (Please note that portions of this note werecompleted with a voice recognition program.  Efforts were made to edit the dictations but occasionally words are mis-transcribed.)    FINAL IMPRESSION      1. Leg swelling New Problem   2. Cellulitis of right lower extremity    3. Onychomycosis    4. Noninfected skin tear of lower extremity, right, initial encounter    5. Essential hypertension    6. Hyperglycemia    7. Anticoagulation adequate    8. Peripheral vascular disease of lower extremity (HCC)          DISPOSITION/PLAN   DISPOSITION        PATIENT REFERRED TO:  No follow-up provider specified.     DISCHARGE MEDICATIONS:  Discharge Medication List as of 7/7/2019  6:41 PM             (Please note that portions of this note were completed with a voice recognition program.  Efforts were made to edit the dictations but occasionally words are mis-transcribed.)    Rosita Peterson MD (electronically signed)  Attending Emergency Physician            Rosita Peterson MD  07/09/19 7234